# Patient Record
Sex: MALE | Race: BLACK OR AFRICAN AMERICAN | NOT HISPANIC OR LATINO | Employment: OTHER | URBAN - METROPOLITAN AREA
[De-identification: names, ages, dates, MRNs, and addresses within clinical notes are randomized per-mention and may not be internally consistent; named-entity substitution may affect disease eponyms.]

---

## 2017-02-13 ENCOUNTER — HOSPITAL ENCOUNTER (EMERGENCY)
Facility: HOSPITAL | Age: 72
Discharge: HOME | End: 2017-02-13
Attending: EMERGENCY MEDICINE
Payer: COMMERCIAL

## 2017-02-13 VITALS
TEMPERATURE: 98 F | WEIGHT: 121 LBS | OXYGEN SATURATION: 97 % | DIASTOLIC BLOOD PRESSURE: 61 MMHG | BODY MASS INDEX: 16.04 KG/M2 | SYSTOLIC BLOOD PRESSURE: 127 MMHG | HEART RATE: 59 BPM | RESPIRATION RATE: 18 BRPM | HEIGHT: 73 IN

## 2017-02-13 DIAGNOSIS — N30.00 ACUTE CYSTITIS WITHOUT HEMATURIA: Primary | ICD-10-CM

## 2017-02-13 DIAGNOSIS — N40.0 ENLARGED PROSTATE: ICD-10-CM

## 2017-02-13 DIAGNOSIS — K59.00 CONSTIPATION, UNSPECIFIED CONSTIPATION TYPE: ICD-10-CM

## 2017-02-13 DIAGNOSIS — R10.33 PERIUMBILICAL ABDOMINAL PAIN: ICD-10-CM

## 2017-02-13 LAB
ALBUMIN SERPL BCP-MCNC: 3.7 G/DL
ALP SERPL-CCNC: 119 U/L
ALT SERPL W/O P-5'-P-CCNC: 11 U/L
ANION GAP SERPL CALC-SCNC: 8 MMOL/L
AST SERPL-CCNC: 17 U/L
BACTERIA #/AREA URNS HPF: NORMAL /HPF
BASOPHILS # BLD AUTO: 0.01 K/UL
BASOPHILS NFR BLD: 0.2 %
BILIRUB SERPL-MCNC: 0.5 MG/DL
BILIRUB UR QL STRIP: NEGATIVE
BUN SERPL-MCNC: 6 MG/DL
CALCIUM SERPL-MCNC: 9.3 MG/DL
CHLORIDE SERPL-SCNC: 103 MMOL/L
CLARITY UR: CLEAR
CO2 SERPL-SCNC: 29 MMOL/L
COLOR UR: YELLOW
CREAT SERPL-MCNC: 1 MG/DL
DIFFERENTIAL METHOD: ABNORMAL
EOSINOPHIL # BLD AUTO: 0.2 K/UL
EOSINOPHIL NFR BLD: 3.2 %
ERYTHROCYTE [DISTWIDTH] IN BLOOD BY AUTOMATED COUNT: 15.2 %
EST. GFR  (AFRICAN AMERICAN): >60 ML/MIN/1.73 M^2
EST. GFR  (NON AFRICAN AMERICAN): >60 ML/MIN/1.73 M^2
GLUCOSE SERPL-MCNC: 99 MG/DL
GLUCOSE UR QL STRIP: NEGATIVE
HCT VFR BLD AUTO: 51.9 %
HGB BLD-MCNC: 17.7 G/DL
HGB UR QL STRIP: NEGATIVE
KETONES UR QL STRIP: NEGATIVE
LEUKOCYTE ESTERASE UR QL STRIP: ABNORMAL
LIPASE SERPL-CCNC: 35 U/L
LYMPHOCYTES # BLD AUTO: 2.1 K/UL
LYMPHOCYTES NFR BLD: 35.8 %
MCH RBC QN AUTO: 30.4 PG
MCHC RBC AUTO-ENTMCNC: 34.1 %
MCV RBC AUTO: 89 FL
MICROSCOPIC COMMENT: NORMAL
MONOCYTES # BLD AUTO: 0.7 K/UL
MONOCYTES NFR BLD: 11.8 %
NEUTROPHILS # BLD AUTO: 2.9 K/UL
NEUTROPHILS NFR BLD: 49 %
NITRITE UR QL STRIP: NEGATIVE
PH UR STRIP: 5 [PH] (ref 5–8)
PLATELET # BLD AUTO: 245 K/UL
PMV BLD AUTO: 9.8 FL
POTASSIUM SERPL-SCNC: 3.9 MMOL/L
PROT SERPL-MCNC: 7.4 G/DL
PROT UR QL STRIP: NEGATIVE
RBC # BLD AUTO: 5.82 M/UL
RBC #/AREA URNS HPF: 2 /HPF (ref 0–4)
SODIUM SERPL-SCNC: 140 MMOL/L
SP GR UR STRIP: 1.01 (ref 1–1.03)
SQUAMOUS #/AREA URNS HPF: 2 /HPF
URN SPEC COLLECT METH UR: ABNORMAL
UROBILINOGEN UR STRIP-ACNC: NEGATIVE EU/DL
WBC # BLD AUTO: 5.86 K/UL
WBC #/AREA URNS HPF: 4 /HPF (ref 0–5)

## 2017-02-13 PROCEDURE — 81000 URINALYSIS NONAUTO W/SCOPE: CPT

## 2017-02-13 PROCEDURE — 99284 EMERGENCY DEPT VISIT MOD MDM: CPT

## 2017-02-13 PROCEDURE — 80053 COMPREHEN METABOLIC PANEL: CPT

## 2017-02-13 PROCEDURE — 25500020 PHARM REV CODE 255: Performed by: EMERGENCY MEDICINE

## 2017-02-13 PROCEDURE — 87086 URINE CULTURE/COLONY COUNT: CPT

## 2017-02-13 PROCEDURE — 25000003 PHARM REV CODE 250: Performed by: NURSE PRACTITIONER

## 2017-02-13 PROCEDURE — 83690 ASSAY OF LIPASE: CPT

## 2017-02-13 PROCEDURE — 85025 COMPLETE CBC W/AUTO DIFF WBC: CPT

## 2017-02-13 RX ORDER — DOXYCYCLINE HYCLATE 100 MG
100 TABLET ORAL
Status: COMPLETED | OUTPATIENT
Start: 2017-02-13 | End: 2017-02-13

## 2017-02-13 RX ORDER — DOXYCYCLINE 100 MG/1
100 CAPSULE ORAL 2 TIMES DAILY
Qty: 20 CAPSULE | Refills: 0 | Status: SHIPPED | OUTPATIENT
Start: 2017-02-13 | End: 2017-02-23

## 2017-02-13 RX ORDER — POLYETHYLENE GLYCOL 3350 17 G/17G
17 POWDER, FOR SOLUTION ORAL
Status: COMPLETED | OUTPATIENT
Start: 2017-02-13 | End: 2017-02-13

## 2017-02-13 RX ORDER — POLYETHYLENE GLYCOL 3350 17 G/17G
17 POWDER, FOR SOLUTION ORAL DAILY
Qty: 119 G | Refills: 0 | OUTPATIENT
Start: 2017-02-13 | End: 2022-10-09

## 2017-02-13 RX ADMIN — IOHEXOL 75 ML: 350 INJECTION, SOLUTION INTRAVENOUS at 04:02

## 2017-02-13 RX ADMIN — POLYETHYLENE GLYCOL 3350 17 G: 17 POWDER, FOR SOLUTION ORAL at 06:02

## 2017-02-13 RX ADMIN — DOXYCYCLINE HYCLATE 100 MG: 100 TABLET, COATED ORAL at 06:02

## 2017-02-13 NOTE — ED AVS SNAPSHOT
[facility]  [address] [place] [address]               Gabriel Springer   2017  2:19 PM   ED    Description:  Male : 1945   Department:  [facility]           Your Care was Coordinated By:     Provider Role From To    Leatha Pinedo MD Attending Provider 17 5602 --    Elizabeth Messina NP Nurse Practitioner 17 --      Reason for Visit     Abdominal Pain           Diagnoses this Visit        Comments    Acute cystitis without hematuria    -  Primary     Enlarged prostate         Periumbilical abdominal pain         Constipation, unspecified constipation type           ED Disposition     None           To Do List           Follow-up Information     Schedule an appointment as soon as possible for a visit with Tyler Urrutia MD.    Specialty:  Internal Medicine    Why:  As needed, If symptoms worsen    Contact information:    [address]  [place] [facility] [address]  [phone]          Schedule an appointment as soon as possible for a visit with VANCE Mcclure MD.    Specialty:  Urology    Why:  Urology    Contact information:    [address]  [address]  [facility] [address]  [phone]         These Medications        Disp Refills Start End    doxycycline (VIBRAMYCIN) 100 MG Cap 20 capsule 0 2017    Take 1 capsule (100 mg total) by mouth 2 (two) times daily. - Oral    Pharmacy: [facility]/pharmacy #[id number] [address] Ph #: [phone]       polyethylene glycol (GLYCOLAX) 17 gram/dose powder 119 g 0 2017     Take 17 g by mouth once daily. - Oral    Pharmacy: [facility]/pharmacy #[id number] - [address] Ph #: [phone]         [facility]     [facility] -  Assistance  Registered nurses in the [facility] provide clinical advisement, health education, appointment booking, and other advisory  services.  Call for this free service at [phone].             Medications           Message regarding Medications     Verify the changes and/or additions to your medication regime listed below are the same as discussed with your clinician today.  If any of these changes or additions are incorrect, please notify your healthcare provider.        START taking these NEW medications        Refills    doxycycline (VIBRAMYCIN) 100 MG Cap 0    Sig: Take 1 capsule (100 mg total) by mouth 2 (two) times daily.    Class: Print    Route: Oral    polyethylene glycol (GLYCOLAX) 17 gram/dose powder 0    Sig: Take 17 g by mouth once daily.    Class: Print    Route: Oral      These medications were administered today        Dose Freq    omnipaque 350 iohexol 75 mL 75 mL IMG once as needed    Sig: Inject 75 mLs into the vein ONCE PRN for contrast.    Class: Normal    Route: Intravenous    polyethylene glycol packet 17 g 17 g ED 1 Time    Sig: Take 17 g by mouth ED 1 Time.    Class: Normal    Route: Oral    doxycycline tablet 100 mg 100 mg ED 1 Time    Sig: Take 1 tablet (100 mg total) by mouth ED 1 Time.    Class: Normal    Route: Oral           Verify that the below list of medications is an accurate representation of the medications you are currently taking.  If none reported, the list may be blank. If incorrect, please contact your healthcare provider. Carry this list with you in case of emergency.           Current Medications     tamsulosin (FLOMAX) 0.4 mg Cp24 Take 0.4 mg by mouth once daily.    doxycycline (VIBRAMYCIN) 100 MG Cap Take 1 capsule (100 mg total) by mouth 2 (two) times daily.    doxycycline tablet 100 mg Take 1 tablet (100 mg total) by mouth ED 1 Time.    polyethylene glycol (GLYCOLAX) 17 gram/dose powder Take 17 g by mouth once daily.    polyethylene glycol packet 17 g Take 17 g by mouth ED 1 Time.           Clinical Reference Information           Your Vitals Were     BP Pulse Temp Resp Height Weight     "127/61 (BP Location: Right arm, Patient Position: Sitting, BP Method: Automatic) 59 97.8 °F (36.6 °C) (Oral) 18 6' 1" (1.854 m) 54.9 kg (121 lb)    SpO2 BMI             97% 15.96 kg/m2         Allergies as of 2/13/2017        Reactions    Penicillins Hives      Immunizations Administered on Date of Encounter - 2/13/2017     None      ED Micro, Lab, POCT     Start Ordered       Status Ordering Provider    02/13/17 1735 02/13/17 1735  Urine culture **CANNOT BE ORDERED STAT**  Once      Ordered     02/13/17 1503 02/13/17 1503  Urinalysis Microscopic  Once      Final result     02/13/17 1502 02/13/17 1503  Lipase  STAT      Final result     02/13/17 1502 02/13/17 1503  Urinalysis  STAT      Final result     02/13/17 1501 02/13/17 1503  CBC auto differential  STAT      Final result     02/13/17 1501 02/13/17 1503  Comprehensive metabolic panel  STAT      Final result       ED Imaging Orders     Start Ordered       Status Ordering Provider    02/13/17 1502 02/13/17 1503  CT Abdomen Pelvis With Contrast  1 time imaging      Final result         Discharge Instructions       Please return to the ED for any new or worsening symptoms: chest pain, shortness of breath, loss of consciousness or any other concerns. Please follow up with primary care within in the week. You may also call [phone] for the [facility] same day appointment line.    Discharge References/Attachments     CONSTIPATION (ADULT) (ENGLISH)    BLADDER INFECTION, MALE (ADULT) (ENGLISH)      [facility] Sign-Up     Activating your [facility] account is as easy as 1-2-3!     1) Visit [email or web address], select Sign Up Now, enter this activation code and your date of birth, then select Next.  [email or web address]  Expires: 3/30/2017  5:45 PM      2) Create a username and password to use when you visit [facility] in the future and select a security question in case you lose your password and select Next.    3) Enter your e-mail address and click Sign " Up!    Additional Information  If you have questions, please e-mail [email or web address] or call [phone] to talk to our [facility] staff. Remember, [facility] is NOT to be used for urgent needs. For medical emergencies, dial 911.         Smoking Cessation     If you would like to quit smoking:   You may be eligible for free services if you are a [place] resident and started smoking cigarettes before September 1, 1988.  Call the Smoking Cessation Trust (SCT) toll free at (148) 965-7910 or (580) 069-6567.   Call -259-QUIT-NOW if you do not meet the above criteria.             [facility] complies with applicable Federal civil rights laws and does not discriminate on the basis of race, color, national origin, age, disability, or sex.        Language Assistance Services     ATTENTION: Language assistance services are available, free of charge. Please call [phone].      ATENCIÓN: Si habla español, tiene a munoz disposición servicios gratuitos de asistencia lingüística. Llame al [phone].     CHÚ Ý: N?u b?n nói Ti?ng Vi?t, có các d?ch v? h? tr? ngôn ng? mi?n phí imanih cho b?n. G?i s? [phone].

## 2017-02-13 NOTE — DISCHARGE INSTRUCTIONS
Please return to the ED for any new or worsening symptoms: chest pain, shortness of breath, loss of consciousness or any other concerns. Please follow up with primary care within in the week. You may also call [phone] for the [facility] same day appointment line.

## 2017-02-13 NOTE — ED PROVIDER NOTES
"Encounter Date: 2/13/2017    SCRIBE #1 NOTE: I, Marlon Lazo, am scribing for, and in the presence of,  Elizabeth Messina NP. I have scribed the following portions of the note - Other sections scribed: ROS, HPI.       History     Chief Complaint   Patient presents with    Abdominal Pain     states he's had abd pain for 2 weeks now     Review of patient's allergies indicates:   Allergen Reactions    Penicillins Hives     HPI Comments: CC: Abdominal Pain    HPI: Patient is a 71 y.o. M with a past medical history of Prostate atrophy who presents to the ED for evaluation of acute onset lower abdominal pain and constipation x10 days. Pain is mild and worse while lying flat. Patient notes some relief when he "pulls down the waistband of his boxers." He attempted treatment with Pepto Bismol with no relief. Patient denies fever, vomiting, diarrhea, black/bloody stools, testicular pain, dysuria, chest pain, shortness of breath, and cough. PCP Dr. Mckeon instructed him to come to the ED for further evaluation. He reports no history of abdominal surgery. He smokes 1 pack/day and drinks 1 beer/day.      The history is provided by the patient. No  was used.     Past Medical History   Diagnosis Date    Prostate atrophy      No past medical history pertinent negatives.  History reviewed. No pertinent past surgical history.  History reviewed. No pertinent family history.  Social History   Substance Use Topics    Smoking status: Current Some Day Smoker     Packs/day: 1.00     Types: Cigarettes    Smokeless tobacco: None    Alcohol use 0.6 oz/week     1 Cans of beer per week      Comment: every night     Review of Systems   Constitutional: Negative for fever.   HENT: Negative for sore throat.    Eyes: Negative for redness.   Respiratory: Negative for cough and shortness of breath.    Cardiovascular: Negative for chest pain.   Gastrointestinal: Positive for abdominal pain (lower) and constipation. Negative " for blood in stool, diarrhea and vomiting.   Genitourinary: Negative for dysuria and testicular pain.   Musculoskeletal: Negative for back pain.   Skin: Negative for rash.   Neurological: Negative for headaches.       Physical Exam   Initial Vitals   BP Pulse Resp Temp SpO2   02/13/17 1347 02/13/17 1347 02/13/17 1347 02/13/17 1347 02/13/17 1347   142/65 60 18 97.9 °F (36.6 °C) 98 %     Physical Exam    Constitutional: Vital signs are normal. He appears well-developed and well-nourished.  Non-toxic appearance.   Eyes: EOM are normal.   Neck: Full passive range of motion without pain. Neck supple. No rigidity.   Cardiovascular: Normal rate, S1 normal, S2 normal and normal heart sounds. Exam reveals no gallop.    No murmur heard.  Pulmonary/Chest: Effort normal and breath sounds normal. No tachypnea. He has no decreased breath sounds. He has no wheezes. He has no rhonchi. He has no rales.   Abdominal: Soft. Normal appearance. There is no tenderness. There is no CVA tenderness, no tenderness at McBurney's point and negative Mejia's sign.   Neurological: He is alert and oriented to person, place, and time. GCS eye subscore is 4. GCS verbal subscore is 5. GCS motor subscore is 6.   Skin: Skin is warm, dry and intact. No rash noted.   Psychiatric: He has a normal mood and affect.         ED Course   Procedures  Labs Reviewed   CBC W/ AUTO DIFFERENTIAL - Abnormal; Notable for the following:        Result Value    RDW 15.2 (*)     All other components within normal limits   COMPREHENSIVE METABOLIC PANEL - Abnormal; Notable for the following:     BUN, Bld 6 (*)     All other components within normal limits   URINALYSIS - Abnormal; Notable for the following:     Leukocytes, UA 2+ (*)     All other components within normal limits   CULTURE, URINE   LIPASE   URINALYSIS MICROSCOPIC             Medical Decision Making:   ED Management:  This is a 71-year-old male who presents to the ED with complaints of periumbilical abdominal  pain.  He is afebrile and well-appearing.  On exam, abdomen is soft and nontender.  He is very thin.  Laboratory evaluation grossly unremarkable.  CT of abdomen/pelvis with contrast shows no acute findings other than severe prostatomegaly.  Patient reports he is being treated for this.  There is also a large amount of stool noted.  UA shows 2+ leukocytes.  I will treat with doxycycline and MiraLAX.  I suspect a low probability for prostatitis, appendicitis, pyelonephritis or other serious etiology.  Discharged home with instructions for supportive care and follow-up.  Return precautions given.  Patient's case was discussed with Dr. Pinedo, who agrees with this plan of care.            Scribe Attestation:   Scribe #1: I performed the above scribed service and the documentation accurately describes the services I performed. I attest to the accuracy of the note.    Attending Attestation:           Physician Attestation for Scribe:  Physician Attestation Statement for Scribe #1: I, Elizabeth Messina NP, reviewed documentation, as scribed by Marlon Lazo in my presence, and it is both accurate and complete.                 ED Course     Clinical Impression:   The primary encounter diagnosis was Acute cystitis without hematuria. Diagnoses of Enlarged prostate, Periumbilical abdominal pain, and Constipation, unspecified constipation type were also pertinent to this visit.    Disposition:   Disposition: Discharged  Condition: Stable       Elizabeth Messina NP  02/13/17 2110

## 2017-02-13 NOTE — ED TRIAGE NOTES
Pt states that been having stomach pain for 2 weeks.  Pt states middle area of stomach has been pain.  States that can't have any pressure to stomach.  Pt states when walking, standing or sitting pain is a 3.  Pt states that when he laying down the pain is a 10.  Denies trauma, nausea or vomiting.

## 2017-02-15 LAB — BACTERIA UR CULT: NORMAL

## 2017-05-01 ENCOUNTER — HOSPITAL ENCOUNTER (EMERGENCY)
Facility: HOSPITAL | Age: 72
Discharge: HOME | End: 2017-05-01
Attending: EMERGENCY MEDICINE
Payer: COMMERCIAL

## 2017-05-01 VITALS
SYSTOLIC BLOOD PRESSURE: 150 MMHG | TEMPERATURE: 98 F | HEIGHT: 73 IN | DIASTOLIC BLOOD PRESSURE: 74 MMHG | HEART RATE: 88 BPM | RESPIRATION RATE: 20 BRPM | BODY MASS INDEX: 15.77 KG/M2 | OXYGEN SATURATION: 99 % | WEIGHT: 119 LBS

## 2017-05-01 DIAGNOSIS — R21 RASH: Primary | ICD-10-CM

## 2017-05-01 PROCEDURE — 99283 EMERGENCY DEPT VISIT LOW MDM: CPT | Mod: 25

## 2017-05-01 PROCEDURE — 96372 THER/PROPH/DIAG INJ SC/IM: CPT

## 2017-05-01 PROCEDURE — 63600175 PHARM REV CODE 636 W HCPCS: Performed by: PHYSICIAN ASSISTANT

## 2017-05-01 RX ORDER — DESONIDE 0.5 MG/G
CREAM TOPICAL 2 TIMES DAILY
Qty: 60 G | Refills: 1 | Status: SHIPPED | OUTPATIENT
Start: 2017-05-01 | End: 2023-01-31

## 2017-05-01 RX ORDER — BETAMETHASONE SODIUM PHOSPHATE AND BETAMETHASONE ACETATE 3; 3 MG/ML; MG/ML
6 INJECTION, SUSPENSION INTRA-ARTICULAR; INTRALESIONAL; INTRAMUSCULAR; SOFT TISSUE
Status: COMPLETED | OUTPATIENT
Start: 2017-05-01 | End: 2017-05-01

## 2017-05-01 RX ADMIN — BETAMETHASONE SODIUM PHOSPHATE AND BETAMETHASONE ACETATE 6 MG: 3; 3 INJECTION, SUSPENSION INTRA-ARTICULAR; INTRALESIONAL; INTRAMUSCULAR at 10:05

## 2017-05-01 NOTE — ED PROVIDER NOTES
Encounter Date: 5/1/2017    SCRIBE #1 NOTE: I, Lenard Rushing, am scribing for, and in the presence of,  MECHE Herbert. I have scribed the following portions of the note - Other sections scribed: HPI and ROS.       History     Chief Complaint   Patient presents with    Rash     x4 days to legs and feet, +itching, dry flaking skin     Review of patient's allergies indicates:   Allergen Reactions    Penicillins Hives     HPI Comments: CC: Rash     HPI: This 72 y.o M smoker with prostate atrophy presents to the ED c/o acute onset of a constant rash to the bilateral forearms, bilateral hands and bilateral ankles x4 days. The pt reports associtated itching and dry flaking skin. Pt reports a previous episode with similar symptoms 11 years ago. The pt denies fever, chills, and generalized body aches. No prior tx.     The history is provided by the patient. No  was used.     Past Medical History:   Diagnosis Date    Prostate atrophy      History reviewed. No pertinent surgical history.  History reviewed. No pertinent family history.  Social History   Substance Use Topics    Smoking status: Current Some Day Smoker     Packs/day: 1.00     Types: Cigarettes    Smokeless tobacco: None    Alcohol use 0.6 oz/week     1 Cans of beer per week      Comment: every night     Review of Systems   Constitutional: Negative for chills and fever.   HENT: Negative for sore throat.    Respiratory: Negative for shortness of breath.    Cardiovascular: Negative for chest pain.   Gastrointestinal: Negative for nausea.   Genitourinary: Negative for dysuria.   Musculoskeletal: Negative for back pain and myalgias.   Skin: Positive for rash.        (+) itching   Neurological: Negative for weakness.   Hematological: Does not bruise/bleed easily.       Physical Exam   Initial Vitals   BP Pulse Resp Temp SpO2   05/01/17 0919 05/01/17 0919 05/01/17 0919 05/01/17 0919 05/01/17 0919   155/71 87 17 97.6 °F (36.4 °C) 99 %      Physical Exam    Constitutional: He appears well-developed.   Cardiovascular: Normal rate, regular rhythm, normal heart sounds and intact distal pulses.   Pulmonary/Chest: Breath sounds normal.   Musculoskeletal: Normal range of motion. He exhibits no edema or tenderness.   Neurological: He is alert and oriented to person, place, and time. He has normal strength.   Skin:   Dry, scaly eczematous like rash to bilateral upper extremities and bilateral ankles.         ED Course   Procedures  Labs Reviewed - No data to display          Medical Decision Making:   Initial Assessment:   72-year-old male presents complaining of pruritic flaky rash to bilateral upper extremities and bilateral ankles for 4 days.  Patient denies history of eczema or psoriasis.  On exam patient has been extremitas like rash to bilateral upper extremities and bilateral ankles.  There is no erythema or tenderness to suggest infection at this time.  I suspect patient has eczema or a contact dermatitis.  Patient given Celestone 6 mg IM in ED.  I will discharge patient home with desonide cream.  Patient instructed to follow up with dermatologist.  Patient stable for discharge.            Scribe Attestation:   Scribe #1: I performed the above scribed service and the documentation accurately describes the services I performed. I attest to the accuracy of the note.    Attending Attestation:     Physician Attestation Statement for NP/PA:   I have conducted a face to face encounter with this patient in addition to the NP/PA, due to NP/PA Request    Other NP/PA Attestation Additions:      Medical Decision Makin y.o. male presents with scaly itchy rash to bilateral arms and bilateral lower extremities for approximately one week.  On exam he has scaly pruritic rash, no petechiae or purpura.  No induration, erythema, or fluctuance.  Patient does have evidence of venous stasis to bilateral lower extremities.  Given IM steroid, and will be treated with  oral Benadryl and topical steroid cream.  Plan to refer to PCP and dermatology. I have seen and examined this patient with mid-level provider and agree with physical exam, assessment and plan.        Physician Attestation for Scribe:  Physician Attestation Statement for Scribe #1: I, MECHE Herbert, reviewed documentation, as scribed by Lenard Rushing in my presence, and it is both accurate and complete.                 ED Course     Clinical Impression:   The encounter diagnosis was Rash.          MECHE Velez  05/01/17 5728       Marlys Payne MD  05/03/17 4517

## 2017-05-01 NOTE — ED AVS SNAPSHOT
[facility]  [address] [place] [address]               Gabriel Springer   2017 10:08 AM   ED    Description:  Male : 1945   Department:  [facility]           Your Care was Coordinated By:     Provider Role From To    Marlys Payne MD Attending Provider 17 1015 --    MECHE Velez Physician Assistant 17 1015 --      Reason for Visit     Rash           Diagnoses this Visit        Comments    Rash    -  Primary       ED Disposition     None           To Do List           Follow-up Information     Schedule an appointment as soon as possible for a visit with Aidan Ellis MD.    Specialty:  Dermatology    Contact information:    [address]  [facility] [place] [address]  [phone]         These Medications        Disp Refills Start End    desonide (DESOWEN) 0.05 % cream 60 g 1 2017    Apply topically 2 (two) times daily. - Topical (Top)    Pharmacy: [facility]/pharmacy #[id number] - [place] [address] #: [phone]         [facility]     [facility] Care [facility] -  Assistance  Unless otherwise directed by your provider, please contact [facility], our nurse care line that is available for  assistance.     Registered nurses in the [facility] provide: appointment scheduling, clinical advisement, health education, and other advisory services.  Call: [phone] (toll free)               Medications           Message regarding Medications     Verify the changes and/or additions to your medication regime listed below are the same as discussed with your clinician today.  If any of these changes or additions are incorrect, please notify your healthcare provider.        START taking these NEW medications        Refills    desonide (DESOWEN) 0.05 % cream 1    Sig: Apply topically 2 (two) times daily.    Class: Print     "Route: Topical (Top)      These medications were administered today        Dose Freq    betamethasone acetate-betamethasone sodium phosphate injection 6 mg 6 mg ED 1 Time    Sig: Inject 1 mL (6 mg total) into the muscle ED 1 Time.    Class: Normal    Route: Intramuscular    Cosign for Ordering: Required by Marlys Payne MD           Verify that the below list of medications is an accurate representation of the medications you are currently taking.  If none reported, the list may be blank. If incorrect, please contact your healthcare provider. Carry this list with you in case of emergency.           Current Medications     desonide (DESOWEN) 0.05 % cream Apply topically 2 (two) times daily.    polyethylene glycol (GLYCOLAX) 17 gram/dose powder Take 17 g by mouth once daily.    tamsulosin (FLOMAX) 0.4 mg Cp24 Take 0.4 mg by mouth once daily.           Clinical Reference Information           Your Vitals Were     BP Pulse Temp Resp Height Weight    155/71 (BP Location: Right arm, Patient Position: Sitting) 87 97.6 °F (36.4 °C) (Oral) 17 6' 1" (1.854 m) 54 kg (119 lb)    SpO2 BMI             99% 15.7 kg/m2         Allergies as of 5/1/2017        Reactions    Penicillins Hives      Immunizations Administered on Date of Encounter - 5/1/2017     None      ED Micro, Lab, POCT     None      ED Imaging Orders     None      Discharge References/Attachments     DERMATITIS, WHAT IS ATOPIC  (ENGLISH)      [facility] Sign-Up     Activating your [facility] account is as easy as 1-2-3!     1) Visit [email or web address], select Sign Up Now, enter this activation code and your date of birth, then select Next.  [email or web address]  Expires: 6/15/2017 11:02 AM      2) Create a username and password to use when you visit [facility] in the future and select a security question in case you lose your password and select Next.    3) Enter your e-mail address and click Sign Up!    Additional Information  If you have questions, please e-mail " [email or web address] or call [phone] to talk to our [facility] staff. Remember, [facility] is NOT to be used for urgent needs. For medical emergencies, dial 911.         Smoking Cessation     If you would like to quit smoking:   You may be eligible for free services if you are a [place] resident and started smoking cigarettes before September 1, 1988.  Call the Smoking Cessation Trust ([facility]) toll free at ([phone] [phone] or (079) 725-3360.   Call 4-813-QUIT-NOW if you do not meet the above criteria.   Contact us via email: [email or web address]   View our website for more information: [email or web address]         [facility] complies with applicable Federal civil rights laws and does not discriminate on the basis of race, color, national origin, age, disability, or sex.        Language Assistance Services     ATTENTION: Language assistance services are available, free of charge. Please call [phone].      ATENCIÓN: Si habla español, tiene a munoz disposición servicios gratuitos de asistencia lingüística. Llame al [phone].     CHÚ Ý: N?u b?n nói Ti?ng Vi?t, có các d?ch v? h? tr? ngôn ng? mi?n phí dành cho b?n. G?i s? [phone].

## 2018-05-14 ENCOUNTER — HOSPITAL ENCOUNTER (EMERGENCY)
Facility: HOSPITAL | Age: 73
Discharge: HOME | End: 2018-05-14
Attending: EMERGENCY MEDICINE
Payer: COMMERCIAL

## 2018-05-14 VITALS
HEART RATE: 80 BPM | DIASTOLIC BLOOD PRESSURE: 81 MMHG | BODY MASS INDEX: 15.64 KG/M2 | OXYGEN SATURATION: 98 % | RESPIRATION RATE: 16 BRPM | WEIGHT: 118 LBS | TEMPERATURE: 98 F | SYSTOLIC BLOOD PRESSURE: 126 MMHG | HEIGHT: 73 IN

## 2018-05-14 DIAGNOSIS — R10.9 PAIN, FLANK, BILATERAL: Primary | ICD-10-CM

## 2018-05-14 DIAGNOSIS — K59.00 CONSTIPATION: ICD-10-CM

## 2018-05-14 LAB
BILIRUB UR QL STRIP: NEGATIVE
CLARITY UR: CLEAR
COLOR UR: YELLOW
GLUCOSE UR QL STRIP: NEGATIVE
HGB UR QL STRIP: NEGATIVE
KETONES UR QL STRIP: NEGATIVE
LEUKOCYTE ESTERASE UR QL STRIP: NEGATIVE
MICROSCOPIC COMMENT: NORMAL
NITRITE UR QL STRIP: NEGATIVE
PH UR STRIP: 7 [PH] (ref 5–8)
PROT UR QL STRIP: NEGATIVE
SP GR UR STRIP: 1.01 (ref 1–1.03)
URN SPEC COLLECT METH UR: NORMAL
UROBILINOGEN UR STRIP-ACNC: NEGATIVE EU/DL
WBC #/AREA URNS HPF: 1 /HPF (ref 0–5)

## 2018-05-14 PROCEDURE — 81000 URINALYSIS NONAUTO W/SCOPE: CPT

## 2018-05-14 PROCEDURE — 99284 EMERGENCY DEPT VISIT MOD MDM: CPT

## 2018-05-14 RX ORDER — METHOCARBAMOL 500 MG/1
1000 TABLET, FILM COATED ORAL 3 TIMES DAILY
Qty: 30 TABLET | Refills: 0 | Status: SHIPPED | OUTPATIENT
Start: 2018-05-14 | End: 2018-05-19

## 2018-05-14 NOTE — ED PROVIDER NOTES
Encounter Date: 5/14/2018  SORT MSE:  Pt is a 73 y.o. male who presents for emergent consideration for bilateral flank pain, onset 1 week, denies trauma, denies n/v/d. Reports history of bph.  States he has not had a bm in 5-6 days and took a laxative yesterday then had a bm.  Pt will be moved to room when one is available, otherwise will wait in waiting room with triage nurse supervision.  Pt arrived by ambulatory. He is not in distress. Orders have been placed. MICHELLE Vergara DNP [facility] 05/14/2018 1050       History     Chief Complaint   Patient presents with    Flank Pain     reports bilateral flank x 1 wk; denies NVD; last BM today normal; denies dysuria     HPI   This 73-year-old male presents to the emergency room complaining of bilateral flank pain worse with twisting and moving.  There is no history of trauma.  The patient has had symptoms for about a week.  He is essentially otherwise well without nausea vomiting diarrhea or painful urination.  He had a normal bowel movement today.  There is no history of trauma.  He is otherwise well.  Review of patient's allergies indicates:   Allergen Reactions    Penicillins Hives     Past Medical History:   Diagnosis Date    Prostate atrophy      No past surgical history on file.  No family history on file.  Social History   Substance Use Topics    Smoking status: Current Some Day Smoker     Packs/day: 1.00     Types: Cigarettes    Smokeless tobacco: Not on file    Alcohol use 0.6 oz/week     1 Cans of beer per week      Comment: every night     Review of Systems  The patient was questioned specifically with regard to the following.  General: Fever, chills, sweats. Neuro: Headache. Eyes: eye problems. ENT: Ear pain, sore throat. Cardiovascular: Chest pain. Respiratory: Cough, shortness of breath. Gastrointestinal: Abdominal pain, vomiting, diarrhea. Genitourinary: Painful urination.  Musculoskeletal: Arm and leg problems. Skin: Rash.  The review of systems was  negative except for the following:  Bilateral flank pain  Physical Exam     Initial Vitals [05/14/18 1050]   BP Pulse Resp Temp SpO2   (!) 122/58 76 20 97.8 °F (36.6 °C) 97 %      MAP       79.33         Physical Exam  The patient was examined specifically for the following:   General:No significant distress, Good color, Warm and dry. Head and neck:Scalp atraumatic, Neck supple. Neurological:Appropriate conversation, Gross motor deficits. Eyes:Conjugate gaze, Clear corneas. ENT: No epistaxis. Cardiac: Regular rate and rhythm, Grossly normal heart tones. Pulmonary: Wheezing, Rales. Gastrointestinal: Abdominal tenderness, Abdominal distention. Musculoskeletal: Extremity deformity, Apparent pain with range of motion of the joints. Skin: Rash.   The findings on examination were normal except for the following:  The patient is very thin.  There is no significant abdominal tenderness or midline lumbar tenderness.  The patient has mild tenderness in the mid axillary line in both flanks.  The lungs are clear.  The heart tones are normal.  The extremities are nontender.  The patient is in no distress.  ED Course   Procedures  Labs Reviewed   URINALYSIS, REFLEX TO URINE CULTURE   URINALYSIS MICROSCOPIC        Medical decision making:  Given the above, it seems highly unlikely that this patient has kidney stone aortic disease pyelonephritis urinary tract infections.  The patient's urinalysis is negative flat rectal the abdomen fails to reveal significant abnormalities.  X-Rays:   Independently Interpreted Readings:   Other Readings:  Lab directed the abdomen fails to reveal significant abnormalities                         Clinical Impression:   The primary encounter diagnosis was Pain, flank, bilateral. A diagnosis of Constipation was also pertinent to this visit.                           Jordan Schwartz MD  05/14/18 8393

## 2018-05-14 NOTE — DISCHARGE INSTRUCTIONS
Robaxin for pain. Please return immediately if you get worse or if new problems develop.  Lots of liquids.  Rest.  You may use a heating pad.

## 2018-05-15 ENCOUNTER — PES CALL (OUTPATIENT)
Dept: ADMINISTRATIVE | Facility: CLINIC | Age: 73
End: 2018-05-15

## 2019-01-09 ENCOUNTER — HOSPITAL ENCOUNTER (EMERGENCY)
Facility: HOSPITAL | Age: 74
Discharge: HOME | End: 2019-01-09
Attending: EMERGENCY MEDICINE
Payer: COMMERCIAL

## 2019-01-09 VITALS
SYSTOLIC BLOOD PRESSURE: 151 MMHG | BODY MASS INDEX: 15.24 KG/M2 | WEIGHT: 115 LBS | HEIGHT: 73 IN | HEART RATE: 86 BPM | OXYGEN SATURATION: 96 % | DIASTOLIC BLOOD PRESSURE: 72 MMHG | TEMPERATURE: 99 F | RESPIRATION RATE: 20 BRPM

## 2019-01-09 DIAGNOSIS — R09.1 PLEURISY: ICD-10-CM

## 2019-01-09 DIAGNOSIS — J20.9 ACUTE BRONCHITIS, UNSPECIFIED ORGANISM: Primary | ICD-10-CM

## 2019-01-09 LAB
ALBUMIN SERPL BCP-MCNC: 3.7 G/DL
ALP SERPL-CCNC: 105 U/L
ALT SERPL W/O P-5'-P-CCNC: 13 U/L
ANION GAP SERPL CALC-SCNC: 10 MMOL/L
AST SERPL-CCNC: 33 U/L
BASOPHILS # BLD AUTO: 0.04 K/UL
BASOPHILS NFR BLD: 1 %
BILIRUB SERPL-MCNC: 0.2 MG/DL
BUN SERPL-MCNC: 7 MG/DL
CALCIUM SERPL-MCNC: 9.1 MG/DL
CHLORIDE SERPL-SCNC: 101 MMOL/L
CO2 SERPL-SCNC: 26 MMOL/L
CREAT SERPL-MCNC: 0.9 MG/DL
DIFFERENTIAL METHOD: ABNORMAL
EOSINOPHIL # BLD AUTO: 0.3 K/UL
EOSINOPHIL NFR BLD: 6.1 %
ERYTHROCYTE [DISTWIDTH] IN BLOOD BY AUTOMATED COUNT: 14.9 %
EST. GFR  (AFRICAN AMERICAN): >60 ML/MIN/1.73 M^2
EST. GFR  (NON AFRICAN AMERICAN): >60 ML/MIN/1.73 M^2
GLUCOSE SERPL-MCNC: 102 MG/DL
HCT VFR BLD AUTO: 43.4 %
HGB BLD-MCNC: 14.5 G/DL
LYMPHOCYTES # BLD AUTO: 1.4 K/UL
LYMPHOCYTES NFR BLD: 34.6 %
MCH RBC QN AUTO: 30.2 PG
MCHC RBC AUTO-ENTMCNC: 33.4 G/DL
MCV RBC AUTO: 90 FL
MONOCYTES # BLD AUTO: 0.5 K/UL
MONOCYTES NFR BLD: 12 %
NEUTROPHILS # BLD AUTO: 1.9 K/UL
NEUTROPHILS NFR BLD: 46.3 %
PLATELET # BLD AUTO: 225 K/UL
PMV BLD AUTO: 10.5 FL
POTASSIUM SERPL-SCNC: 4.6 MMOL/L
PROT SERPL-MCNC: 8 G/DL
RBC # BLD AUTO: 4.8 M/UL
SODIUM SERPL-SCNC: 137 MMOL/L
TROPONIN I SERPL DL<=0.01 NG/ML-MCNC: <0.006 NG/ML
WBC # BLD AUTO: 4.08 K/UL

## 2019-01-09 PROCEDURE — 94640 AIRWAY INHALATION TREATMENT: CPT

## 2019-01-09 PROCEDURE — 99285 EMERGENCY DEPT VISIT HI MDM: CPT | Mod: 25

## 2019-01-09 PROCEDURE — 80053 COMPREHEN METABOLIC PANEL: CPT

## 2019-01-09 PROCEDURE — 93010 ELECTROCARDIOGRAM REPORT: CPT | Mod: ,,, | Performed by: INTERNAL MEDICINE

## 2019-01-09 PROCEDURE — 25000242 PHARM REV CODE 250 ALT 637 W/ HCPCS: Performed by: EMERGENCY MEDICINE

## 2019-01-09 PROCEDURE — 93005 ELECTROCARDIOGRAM TRACING: CPT

## 2019-01-09 PROCEDURE — 36000 PLACE NEEDLE IN VEIN: CPT

## 2019-01-09 PROCEDURE — 85025 COMPLETE CBC W/AUTO DIFF WBC: CPT

## 2019-01-09 PROCEDURE — 84484 ASSAY OF TROPONIN QUANT: CPT

## 2019-01-09 PROCEDURE — 93010 EKG 12-LEAD: ICD-10-PCS | Mod: ,,, | Performed by: INTERNAL MEDICINE

## 2019-01-09 RX ORDER — GUAIFENESIN/DEXTROMETHORPHAN 100-10MG/5
5 SYRUP ORAL 4 TIMES DAILY PRN
Qty: 120 ML | Refills: 0 | COMMUNITY
Start: 2019-01-09 | End: 2019-01-19

## 2019-01-09 RX ORDER — AZITHROMYCIN 250 MG/1
250 TABLET, FILM COATED ORAL DAILY
Qty: 6 TABLET | Refills: 0 | OUTPATIENT
Start: 2019-01-09 | End: 2022-10-09

## 2019-01-09 RX ORDER — ALBUTEROL SULFATE 2.5 MG/.5ML
2.5 SOLUTION RESPIRATORY (INHALATION)
Status: COMPLETED | OUTPATIENT
Start: 2019-01-09 | End: 2019-01-09

## 2019-01-09 RX ORDER — IPRATROPIUM BROMIDE AND ALBUTEROL SULFATE 2.5; .5 MG/3ML; MG/3ML
3 SOLUTION RESPIRATORY (INHALATION)
Status: COMPLETED | OUTPATIENT
Start: 2019-01-09 | End: 2019-01-09

## 2019-01-09 RX ORDER — ALBUTEROL SULFATE 90 UG/1
2 AEROSOL, METERED RESPIRATORY (INHALATION) EVERY 6 HOURS PRN
Qty: 1 INHALER | Refills: 0 | Status: SHIPPED | OUTPATIENT
Start: 2019-01-09 | End: 2023-04-15 | Stop reason: SDUPTHER

## 2019-01-09 RX ADMIN — ALBUTEROL SULFATE 2.5 MG: 2.5 SOLUTION RESPIRATORY (INHALATION) at 01:01

## 2019-01-09 RX ADMIN — IPRATROPIUM BROMIDE AND ALBUTEROL SULFATE 3 ML: .5; 3 SOLUTION RESPIRATORY (INHALATION) at 01:01

## 2019-01-09 NOTE — ED PROVIDER NOTES
"Encounter Date: 1/9/2019    SCRIBE #1 NOTE: I, Queenie Lo, am scribing for, and in the presence of,  Sonido Kelley III, MD. I have scribed the following portions of the note - Other sections scribed: SHILPA WILLIAMSON.       History     Chief Complaint   Patient presents with    Chest Pain     pt here for chest pain and coughing x4 days. reports coughing up "orange stuff".      The patient is a 73 y.o. M who presents with several days of productive cough with associated chest tightness and exertional shortness of breath for three days. The cough is productive of orange sputum. He has had pressure-like central chest discomfort with breathing. He denies diaphoresis and shortness of breath at rest. He has a history of childhood asthma but denies chronic long disease.  He reports improvement in his symptoms after receiving a nebulized breathing treatment in the ED prior to my evaluation. He hadsubjective fever three days ago that resolved after taking Aleve. He quit smoking 4 months ago, but restarted 3 weeks ago and has been smoking 3 cigarettes per day since then. Pt denies nausea, vomiting, palpitations, headache, dizziness, lightheadedness, and sick contacts.      The history is provided by the patient. No  was used.     Review of patient's allergies indicates:   Allergen Reactions    Penicillins Hives     Past Medical History:   Diagnosis Date    Prostate atrophy      No past surgical history on file.  No family history on file.  Social History     Tobacco Use    Smoking status: Current Some Day Smoker     Packs/day: 1.00     Types: Cigarettes   Substance Use Topics    Alcohol use: Yes     Alcohol/week: 0.6 oz     Types: 1 Cans of beer per week     Comment: every night    Drug use: No     Review of Systems   Constitutional: Negative for chills and fever.   HENT: Negative for ear pain, hearing loss, sore throat, tinnitus and trouble swallowing.    Eyes: Negative for visual disturbance. "   Respiratory: Positive for cough (productive) and shortness of breath.    Cardiovascular: Positive for chest pain. Negative for palpitations.   Gastrointestinal: Negative for abdominal pain, diarrhea, nausea and vomiting.   Genitourinary: Negative for dysuria.   Musculoskeletal: Negative for arthralgias, back pain and myalgias.   Skin: Negative for rash.   Neurological: Negative for dizziness, light-headedness and headaches.       Physical Exam     Initial Vitals [01/09/19 1244]   BP Pulse Resp Temp SpO2   138/69 80 18 98.1 °F (36.7 °C) 96 %      MAP       --         Physical Exam    Constitutional: He appears well-developed and well-nourished. He is not diaphoretic. No distress.   HENT:   Head: Normocephalic and atraumatic.   Mouth/Throat: Oropharynx is clear and moist.   Eyes: Conjunctivae are normal. No scleral icterus.   Neck: No JVD present.   Cardiovascular: Normal rate, regular rhythm and normal heart sounds. Exam reveals no gallop and no friction rub.    No murmur heard.  Pulses:       Radial pulses are 2+ on the right side, and 2+ on the left side.        Dorsalis pedis pulses are 2+ on the right side, and 2+ on the left side.   Pulmonary/Chest: No accessory muscle usage or stridor. No tachypnea. No respiratory distress. He has wheezes (scattered).   Abdominal: Soft. There is no tenderness.   Musculoskeletal:   Negative bilateral calf swelling and tenderness. Negative bilateral Orlando's sign.   Neurological: He is alert and oriented to person, place, and time. He has normal strength. No cranial nerve deficit or sensory deficit. GCS eye subscore is 4. GCS verbal subscore is 5. GCS motor subscore is 6.   Skin: Skin is warm and dry. No pallor.         ED Course   Procedures  Labs Reviewed   CBC W/ AUTO DIFFERENTIAL - Abnormal; Notable for the following components:       Result Value    RDW 14.9 (*)     All other components within normal limits   COMPREHENSIVE METABOLIC PANEL - Abnormal; Notable for the  following components:    BUN, Bld 7 (*)     All other components within normal limits   TROPONIN I     EKG Readings: (Independently Interpreted)   Normal sinus rhythm.  Ventricular rate 87 beats per minute. Normal axis.  Normal QRS and QT intervals.  No ST segment elevation or depression.  Normal T-wave morphology.  Possible biatrial enlargement.  No previous studies for comparison.       Imaging Results          X-Ray Chest 1 View (Final result)  Result time 01/09/19 13:08:17    Final result by Tylor Norris MD (01/09/19 13:08:17)                 Impression:      1. 9 mm left upper lobe nodular airspace opacity with density suggestive of calcified granuloma versus calcification within a parenchymal scar.  2. Biapical reticulonodular airspace opacities likely reflects a combination of chronic scarring and/or chronic fibrotic/emphysematous changes in the setting of chronic obstructive physiology.  However, underlying parenchymal nodules cannot be completely excluded.      Electronically signed by: Tylor Norris  Date:    01/09/2019  Time:    13:08             Narrative:    EXAMINATION:  XR CHEST 1 VIEW    CLINICAL HISTORY:  Chest pain    TECHNIQUE:  Single PA view of the chest    COMPARISON:  The    FINDINGS:  Cardiomediastinal silhouette is within normal limits.    9 mm left upper lobe nodular airspace opacity.  Biapical reticulonodular airspace opacities.  Chronic interstitial coarsening, hyperexpanded lung fields and flattening of the bilateral hemidiaphragms with at least moderate centrilobular emphysematous changes.  No overt interstitial edema, sizable pleural effusion or pneumothorax.    Multilevel degenerative changes of the imaged spine.                                 Medical Decision Making:   History:   Old Medical Records: I decided to obtain old medical records.  Independently Interpreted Test(s):   I have ordered and independently interpreted EKG Reading(s) - see prior notes  Clinical Tests:   Lab  Tests: Ordered and Reviewed  Radiological Study: Ordered and Reviewed  Medical Tests: Ordered and Reviewed  ED Management:  1450  The patient states that he is feeling much better.  He is resting comfortably.  He is in no respiratory distress. He still has faint scattered wheezing but exam is much improved from initial eval.  Medical decision making:  This patient was evaluated for several days of productive cough with associated shortness of breath and chest discomfort.  He is afebrile with stable vital signs.  He is in no respiratory distress. He did have scattered wheezing on initial examination. He received nebulized breathing treatments and had improvement both on auscultation of the lungs and subjectively.  Chest radiograph is negative for acute processes.  He has no leukocytosis or other concerning findings on lab review.  Presentation and exam are most consistent with acute bronchitis.  He is stable for discharge. He will be prescribed albuterol MDI, Zithromax Z-Emerson, and cough medication.  He has been instructed to follow up with his PCP within the next few days.  He has been instructed to return to the ED immediately for worsened symptoms or for any other concerns.            Scribe Attestation:   Scribe #1: I performed the above scribed service and the documentation accurately describes the services I performed. I attest to the accuracy of the note.    Attending Attestation:           Physician Attestation for Scribe:  Physician Attestation Statement for Scribe #1: I, Sonido Kelley III, MD, reviewed documentation, as scribed by Queenie Lo in my presence, and it is both accurate and complete.                    Clinical Impression:   The primary encounter diagnosis was Acute bronchitis, unspecified organism. A diagnosis of Pleurisy was also pertinent to this visit.      Disposition:   Disposition: Discharged  Condition: Stable                        Sonido Kelley III, MD  01/09/19 5227

## 2019-05-31 ENCOUNTER — HOSPITAL ENCOUNTER (EMERGENCY)
Facility: HOSPITAL | Age: 74
Discharge: HOME | End: 2019-06-01
Attending: EMERGENCY MEDICINE
Payer: COMMERCIAL

## 2019-05-31 DIAGNOSIS — R10.9 NONSPECIFIC ABDOMINAL PAIN: Primary | ICD-10-CM

## 2019-05-31 DIAGNOSIS — R11.2 NON-INTRACTABLE VOMITING WITH NAUSEA, UNSPECIFIED VOMITING TYPE: ICD-10-CM

## 2019-05-31 PROCEDURE — 99284 EMERGENCY DEPT VISIT MOD MDM: CPT | Mod: 25

## 2019-05-31 PROCEDURE — 96374 THER/PROPH/DIAG INJ IV PUSH: CPT

## 2019-05-31 PROCEDURE — 96361 HYDRATE IV INFUSION ADD-ON: CPT

## 2019-06-01 VITALS
HEART RATE: 71 BPM | HEIGHT: 78 IN | OXYGEN SATURATION: 98 % | SYSTOLIC BLOOD PRESSURE: 156 MMHG | RESPIRATION RATE: 18 BRPM | TEMPERATURE: 98 F | BODY MASS INDEX: 12.73 KG/M2 | DIASTOLIC BLOOD PRESSURE: 71 MMHG | WEIGHT: 110 LBS

## 2019-06-01 LAB
ALBUMIN SERPL BCP-MCNC: 4.5 G/DL (ref 3.5–5.2)
ALP SERPL-CCNC: 123 U/L (ref 55–135)
ALT SERPL W/O P-5'-P-CCNC: 15 U/L (ref 10–44)
ANION GAP SERPL CALC-SCNC: 12 MMOL/L (ref 8–16)
AST SERPL-CCNC: 20 U/L (ref 10–40)
BASOPHILS # BLD AUTO: 0.02 K/UL (ref 0–0.2)
BASOPHILS NFR BLD: 0.2 % (ref 0–1.9)
BILIRUB SERPL-MCNC: 0.6 MG/DL (ref 0.1–1)
BILIRUB UR QL STRIP: NEGATIVE
BUN SERPL-MCNC: 15 MG/DL (ref 8–23)
CALCIUM SERPL-MCNC: 10.4 MG/DL (ref 8.7–10.5)
CHLORIDE SERPL-SCNC: 103 MMOL/L (ref 95–110)
CLARITY UR: CLEAR
CO2 SERPL-SCNC: 26 MMOL/L (ref 23–29)
COLOR UR: YELLOW
CREAT SERPL-MCNC: 1.1 MG/DL (ref 0.5–1.4)
DIFFERENTIAL METHOD: ABNORMAL
EOSINOPHIL # BLD AUTO: 0.2 K/UL (ref 0–0.5)
EOSINOPHIL NFR BLD: 1.6 % (ref 0–8)
ERYTHROCYTE [DISTWIDTH] IN BLOOD BY AUTOMATED COUNT: 14.5 % (ref 11.5–14.5)
EST. GFR  (AFRICAN AMERICAN): >60 ML/MIN/1.73 M^2
EST. GFR  (NON AFRICAN AMERICAN): >60 ML/MIN/1.73 M^2
GLUCOSE SERPL-MCNC: 104 MG/DL (ref 70–110)
GLUCOSE UR QL STRIP: NEGATIVE
HCT VFR BLD AUTO: 48.9 % (ref 40–54)
HGB BLD-MCNC: 16.5 G/DL (ref 14–18)
HGB UR QL STRIP: NEGATIVE
KETONES UR QL STRIP: NEGATIVE
LEUKOCYTE ESTERASE UR QL STRIP: NEGATIVE
LIPASE SERPL-CCNC: 55 U/L (ref 4–60)
LYMPHOCYTES # BLD AUTO: 1.8 K/UL (ref 1–4.8)
LYMPHOCYTES NFR BLD: 19.4 % (ref 18–48)
MCH RBC QN AUTO: 30.3 PG (ref 27–31)
MCHC RBC AUTO-ENTMCNC: 33.7 G/DL (ref 32–36)
MCV RBC AUTO: 90 FL (ref 82–98)
MONOCYTES # BLD AUTO: 0.3 K/UL (ref 0.3–1)
MONOCYTES NFR BLD: 3.4 % (ref 4–15)
NEUTROPHILS # BLD AUTO: 6.9 K/UL (ref 1.8–7.7)
NEUTROPHILS NFR BLD: 75.4 % (ref 38–73)
NITRITE UR QL STRIP: NEGATIVE
PH UR STRIP: 7 [PH] (ref 5–8)
PLATELET # BLD AUTO: 225 K/UL (ref 150–350)
PMV BLD AUTO: 10.3 FL (ref 9.2–12.9)
POTASSIUM SERPL-SCNC: 3.7 MMOL/L (ref 3.5–5.1)
PROT SERPL-MCNC: 8.3 G/DL (ref 6–8.4)
PROT UR QL STRIP: NEGATIVE
RBC # BLD AUTO: 5.45 M/UL (ref 4.6–6.2)
SODIUM SERPL-SCNC: 141 MMOL/L (ref 136–145)
SP GR UR STRIP: 1.02 (ref 1–1.03)
URN SPEC COLLECT METH UR: NORMAL
UROBILINOGEN UR STRIP-ACNC: NEGATIVE EU/DL
WBC # BLD AUTO: 9.12 K/UL (ref 3.9–12.7)

## 2019-06-01 PROCEDURE — 81003 URINALYSIS AUTO W/O SCOPE: CPT

## 2019-06-01 PROCEDURE — 80053 COMPREHEN METABOLIC PANEL: CPT

## 2019-06-01 PROCEDURE — 85025 COMPLETE CBC W/AUTO DIFF WBC: CPT

## 2019-06-01 PROCEDURE — 25000003 PHARM REV CODE 250: Performed by: PHYSICIAN ASSISTANT

## 2019-06-01 PROCEDURE — 63600175 PHARM REV CODE 636 W HCPCS: Performed by: PHYSICIAN ASSISTANT

## 2019-06-01 PROCEDURE — 83690 ASSAY OF LIPASE: CPT

## 2019-06-01 RX ORDER — ACETAMINOPHEN 500 MG
1000 TABLET ORAL
Status: COMPLETED | OUTPATIENT
Start: 2019-06-01 | End: 2019-06-01

## 2019-06-01 RX ORDER — ONDANSETRON 4 MG/1
4 TABLET, FILM COATED ORAL EVERY 6 HOURS
Qty: 6 TABLET | Refills: 0 | OUTPATIENT
Start: 2019-06-01 | End: 2022-10-09

## 2019-06-01 RX ORDER — ONDANSETRON 2 MG/ML
4 INJECTION INTRAMUSCULAR; INTRAVENOUS
Status: COMPLETED | OUTPATIENT
Start: 2019-06-01 | End: 2019-06-01

## 2019-06-01 RX ADMIN — ACETAMINOPHEN 1000 MG: 500 TABLET, FILM COATED ORAL at 12:06

## 2019-06-01 RX ADMIN — ONDANSETRON 4 MG: 2 INJECTION INTRAMUSCULAR; INTRAVENOUS at 12:06

## 2019-06-01 RX ADMIN — SODIUM CHLORIDE 1000 ML: 0.9 INJECTION, SOLUTION INTRAVENOUS at 12:06

## 2019-06-01 NOTE — ED PROVIDER NOTES
Encounter Date: 5/31/2019       History     Chief Complaint   Patient presents with    Abdominal Pain      and cramping, after eating crawfish, reports vomiting x few episodes before EMS arrival      Patient is a 74-year-old male presents to the ER for evaluation of abdominal pain. Patient states that he had a large amount of crawfish at around 2:00 pm. this afternoon.  He states that this evening he had multiple episodes of vomiting with associated abdominal cramping.  He denies any associated diarrhea.  Patient states that he tried Pepto-Bismol but was unable to keep the medication down.  He denies any associated UTI symptoms including dysuria material.  No flank pain. No fevers or chills at home.  No sick contact.  Unsure of other people got sick after eating the crawfish.  No changes in medications otherwise.  Denies any prior abdominal surgeries.    The history is provided by the patient.     Review of patient's allergies indicates:   Allergen Reactions    Penicillins Hives     Past Medical History:   Diagnosis Date    Prostate atrophy      History reviewed. No pertinent surgical history.  No family history on file.  Social History     Tobacco Use    Smoking status: Current Some Day Smoker     Packs/day: 1.00     Types: Cigarettes   Substance Use Topics    Alcohol use: Yes     Alcohol/week: 0.6 oz     Types: 1 Cans of beer per week     Comment: every night    Drug use: No     Review of Systems   Constitutional: Negative for chills and fever.   HENT: Negative for congestion.    Respiratory: Negative for cough and shortness of breath.    Cardiovascular: Negative for chest pain.   Gastrointestinal: Positive for abdominal pain and nausea. Negative for constipation and diarrhea.   Genitourinary: Negative for dysuria, flank pain and hematuria.   Musculoskeletal: Negative for myalgias.   Skin: Negative for rash.   Neurological: Negative for dizziness, weakness and light-headedness.   Hematological: Does not  bruise/bleed easily.   Psychiatric/Behavioral: Negative for confusion.       Physical Exam     Initial Vitals [05/31/19 2207]   BP Pulse Resp Temp SpO2   (!) 172/66 66 18 97.7 °F (36.5 °C) 99 %      MAP       --         Physical Exam    Vitals reviewed.  Constitutional: He appears well-developed and well-nourished. He is not diaphoretic. No distress.   HENT:   Head: Normocephalic and atraumatic.   Eyes: Conjunctivae and EOM are normal.   Neck: Neck supple.   Cardiovascular: Normal rate, regular rhythm, normal heart sounds and intact distal pulses.   Pulmonary/Chest: Breath sounds normal.   Abdominal: Soft. Normal appearance. He exhibits no distension. There is generalized tenderness. There is no rigidity, no rebound, no guarding and no CVA tenderness.   Neurological: He is alert and oriented to person, place, and time.   Skin: Skin is warm and dry.         ED Course   Procedures  Labs Reviewed   CBC W/ AUTO DIFFERENTIAL - Abnormal; Notable for the following components:       Result Value    Gran% 75.4 (*)     Mono% 3.4 (*)     All other components within normal limits   COMPREHENSIVE METABOLIC PANEL   LIPASE   URINALYSIS, REFLEX TO URINE CULTURE    Narrative:     Preferred Collection Type->Urine, Clean Catch          Imaging Results    None                APC / Resident Notes:   Patient seen in the ER promptly upon arrival.  He is afebrile, no acute distress. Physical examination reveals mild diffuse tenderness on palpation throughout the abdomen.  Abdomen soft, nondistended. No CVA tenderness. IV access was established, labs ordered, fluids given.  Patient was given Tylenol and Zofran for discomfort.    Laboratory studies show normal white count 9.1.  Hemoglobin is stable. Chemistries were unremarkable, normal liver and kidney functions..  Urinalysis does not show evidence of infection or blood.  Lipase normal    Upon reassessment patient is resting comfortably.  Serial abdominal exams were unremarkable.  Patient  has not had any episodes of vomiting in the ED.  Will prescribed home on Zofran to use as directed.  Patient advised to have a bland diet and slowly progress diet as tolerated.  Symptoms likely secondary to gastroenteritis.  Patient agreeable to this treatment plan.  Stable for discharge and close follow-up at this time.                 Clinical Impression:       ICD-10-CM ICD-9-CM   1. Nonspecific abdominal pain R10.9 789.00   2. Non-intractable vomiting with nausea, unspecified vomiting type R11.2 787.01         Disposition:   Disposition: Discharged  Condition: Stable                        Jailyn Toledo PA-C  06/01/19 0212

## 2019-06-01 NOTE — DISCHARGE INSTRUCTIONS
Please have a bland diet and slowly progress her diet as tolerated.  Take medication as prescribed.  Follow up with family doctor this week.  Return to the ER if your symptoms worsen

## 2022-10-09 ENCOUNTER — HOSPITAL ENCOUNTER (EMERGENCY)
Facility: HOSPITAL | Age: 77
Discharge: HOME | End: 2022-10-09
Attending: EMERGENCY MEDICINE
Payer: MEDICARE

## 2022-10-09 VITALS
TEMPERATURE: 99 F | BODY MASS INDEX: 19.29 KG/M2 | RESPIRATION RATE: 15 BRPM | SYSTOLIC BLOOD PRESSURE: 181 MMHG | HEIGHT: 66 IN | WEIGHT: 120 LBS | HEART RATE: 78 BPM | DIASTOLIC BLOOD PRESSURE: 80 MMHG | OXYGEN SATURATION: 95 %

## 2022-10-09 DIAGNOSIS — R06.02 SHORTNESS OF BREATH: ICD-10-CM

## 2022-10-09 DIAGNOSIS — J45.901 EXACERBATION OF ASTHMA, UNSPECIFIED ASTHMA SEVERITY, UNSPECIFIED WHETHER PERSISTENT: Primary | ICD-10-CM

## 2022-10-09 LAB
ALBUMIN SERPL BCP-MCNC: 3.6 G/DL (ref 3.5–5.2)
ALP SERPL-CCNC: 112 U/L (ref 55–135)
ALT SERPL W/O P-5'-P-CCNC: 9 U/L (ref 10–44)
ANION GAP SERPL CALC-SCNC: 11 MMOL/L (ref 8–16)
APTT BLDCRRT: 26.5 SEC (ref 21–32)
AST SERPL-CCNC: 17 U/L (ref 10–40)
BASOPHILS # BLD AUTO: 0.05 K/UL (ref 0–0.2)
BASOPHILS NFR BLD: 0.7 % (ref 0–1.9)
BILIRUB SERPL-MCNC: 0.7 MG/DL (ref 0.1–1)
BNP SERPL-MCNC: <10 PG/ML (ref 0–99)
BUN SERPL-MCNC: 5 MG/DL (ref 8–23)
CALCIUM SERPL-MCNC: 9.3 MG/DL (ref 8.7–10.5)
CHLORIDE SERPL-SCNC: 102 MMOL/L (ref 95–110)
CO2 SERPL-SCNC: 26 MMOL/L (ref 23–29)
CREAT SERPL-MCNC: 1 MG/DL (ref 0.5–1.4)
DIFFERENTIAL METHOD: NORMAL
EOSINOPHIL # BLD AUTO: 0.5 K/UL (ref 0–0.5)
EOSINOPHIL NFR BLD: 6.6 % (ref 0–8)
ERYTHROCYTE [DISTWIDTH] IN BLOOD BY AUTOMATED COUNT: 14.1 % (ref 11.5–14.5)
EST. GFR  (NO RACE VARIABLE): >60 ML/MIN/1.73 M^2
GLUCOSE SERPL-MCNC: 114 MG/DL (ref 70–110)
HCT VFR BLD AUTO: 49.8 % (ref 40–54)
HGB BLD-MCNC: 17.3 G/DL (ref 14–18)
IMM GRANULOCYTES # BLD AUTO: 0.01 K/UL (ref 0–0.04)
IMM GRANULOCYTES NFR BLD AUTO: 0.1 % (ref 0–0.5)
INR PPP: 1.1 (ref 0.8–1.2)
LYMPHOCYTES # BLD AUTO: 2 K/UL (ref 1–4.8)
LYMPHOCYTES NFR BLD: 28.7 % (ref 18–48)
MAGNESIUM SERPL-MCNC: 1.8 MG/DL (ref 1.6–2.6)
MCH RBC QN AUTO: 30.9 PG (ref 27–31)
MCHC RBC AUTO-ENTMCNC: 34.7 G/DL (ref 32–36)
MCV RBC AUTO: 89 FL (ref 82–98)
MONOCYTES # BLD AUTO: 0.7 K/UL (ref 0.3–1)
MONOCYTES NFR BLD: 10.2 % (ref 4–15)
NEUTROPHILS # BLD AUTO: 3.7 K/UL (ref 1.8–7.7)
NEUTROPHILS NFR BLD: 53.7 % (ref 38–73)
NRBC BLD-RTO: 0 /100 WBC
PLATELET # BLD AUTO: 192 K/UL (ref 150–450)
PMV BLD AUTO: 9.8 FL (ref 9.2–12.9)
POTASSIUM SERPL-SCNC: 3.5 MMOL/L (ref 3.5–5.1)
PROT SERPL-MCNC: 6.9 G/DL (ref 6–8.4)
PROTHROMBIN TIME: 11.3 SEC (ref 9–12.5)
RBC # BLD AUTO: 5.6 M/UL (ref 4.6–6.2)
SODIUM SERPL-SCNC: 139 MMOL/L (ref 136–145)
TROPONIN I SERPL DL<=0.01 NG/ML-MCNC: <0.006 NG/ML (ref 0–0.03)
TSH SERPL DL<=0.005 MIU/L-ACNC: 1.97 UIU/ML (ref 0.4–4)
WBC # BLD AUTO: 6.86 K/UL (ref 3.9–12.7)

## 2022-10-09 PROCEDURE — 84484 ASSAY OF TROPONIN QUANT: CPT | Performed by: EMERGENCY MEDICINE

## 2022-10-09 PROCEDURE — 85730 THROMBOPLASTIN TIME PARTIAL: CPT | Performed by: EMERGENCY MEDICINE

## 2022-10-09 PROCEDURE — 85025 COMPLETE CBC W/AUTO DIFF WBC: CPT | Performed by: EMERGENCY MEDICINE

## 2022-10-09 PROCEDURE — 80053 COMPREHEN METABOLIC PANEL: CPT | Performed by: EMERGENCY MEDICINE

## 2022-10-09 PROCEDURE — 93005 ELECTROCARDIOGRAM TRACING: CPT

## 2022-10-09 PROCEDURE — 25000242 PHARM REV CODE 250 ALT 637 W/ HCPCS: Performed by: EMERGENCY MEDICINE

## 2022-10-09 PROCEDURE — 83880 ASSAY OF NATRIURETIC PEPTIDE: CPT | Performed by: EMERGENCY MEDICINE

## 2022-10-09 PROCEDURE — 85610 PROTHROMBIN TIME: CPT | Performed by: EMERGENCY MEDICINE

## 2022-10-09 PROCEDURE — 84443 ASSAY THYROID STIM HORMONE: CPT | Performed by: EMERGENCY MEDICINE

## 2022-10-09 PROCEDURE — 99285 EMERGENCY DEPT VISIT HI MDM: CPT | Mod: 25

## 2022-10-09 PROCEDURE — 94640 AIRWAY INHALATION TREATMENT: CPT | Mod: XB

## 2022-10-09 PROCEDURE — 83735 ASSAY OF MAGNESIUM: CPT | Performed by: EMERGENCY MEDICINE

## 2022-10-09 RX ORDER — AZITHROMYCIN 250 MG/1
TABLET, FILM COATED ORAL
Qty: 6 TABLET | Refills: 0 | Status: SHIPPED | OUTPATIENT
Start: 2022-10-09 | End: 2022-10-14

## 2022-10-09 RX ORDER — ALBUTEROL SULFATE 2.5 MG/.5ML
2.5 SOLUTION RESPIRATORY (INHALATION) EVERY 4 HOURS PRN
Qty: 30 EACH | Refills: 0 | Status: SHIPPED | OUTPATIENT
Start: 2022-10-09 | End: 2023-04-15

## 2022-10-09 RX ORDER — IPRATROPIUM BROMIDE AND ALBUTEROL SULFATE 2.5; .5 MG/3ML; MG/3ML
3 SOLUTION RESPIRATORY (INHALATION)
Status: COMPLETED | OUTPATIENT
Start: 2022-10-09 | End: 2022-10-09

## 2022-10-09 RX ORDER — ALBUTEROL SULFATE 90 UG/1
2 AEROSOL, METERED RESPIRATORY (INHALATION)
Status: COMPLETED | OUTPATIENT
Start: 2022-10-09 | End: 2022-10-09

## 2022-10-09 RX ORDER — PREDNISONE 20 MG/1
40 TABLET ORAL DAILY
Qty: 10 TABLET | Refills: 0 | Status: SHIPPED | OUTPATIENT
Start: 2022-10-09 | End: 2022-10-14

## 2022-10-09 RX ADMIN — IPRATROPIUM BROMIDE AND ALBUTEROL SULFATE 3 ML: 2.5; .5 SOLUTION RESPIRATORY (INHALATION) at 02:10

## 2022-10-09 RX ADMIN — ALBUTEROL SULFATE 2 PUFF: 90 AEROSOL, METERED RESPIRATORY (INHALATION) at 03:10

## 2022-10-09 NOTE — DISCHARGE INSTRUCTIONS
Please return for any worsening symptoms, shortness of breath, fever, chest pain or any other concerns. Please follow up closely with your primary care doctor as well as your pulmonologist to discuss recent ED visit.     Thank you for coming to our Emergency Department today. As we discussed, it is important to remember that some problems are difficult to diagnose and may not be found during your Emergency Department visit. Be sure to follow up with your primary care doctor and review all labs/imaging/tests that were performed during this visit with them. Some labs/tests may be outside of the normal range and require non-emergent follow-up and further investigation to help diagnose/exclude/prevent complications or other medical conditions.    If you do not have a primary care doctor, you may contact the one listed on your discharge paperwork or you may also call the [facility] Appointment Desk at [phone] to schedule an appointment and establish care with one. It is important to your health that you have a primary care doctor.    Please take all medications as directed. All medications may potentially have side-effects and it is impossible to predict which medications may give you side-effects or what side-effects (if any) they will give you.. If you feel that you are having a negative effect or side-effect of any medication you should immediately stop taking them and seek medical attention. If you feel that you are having a life-threatening reaction call 911.    Return to the ER with any questions/concerns, new/concerning symptoms, worsening or failure to improve.     Do not drive, swim, climb to height, take a bath or make any important decisions for 24 hours if you have received any pain medications, sedatives or mood altering drugs during your ER visit.

## 2022-10-09 NOTE — PROVIDER PROGRESS NOTES - EMERGENCY DEPT.
Patient signed out to me by Dr. Clark pending reassessment.    76 yo male with shortness of breath.  History of asthma vs COPD -- long tobacco history.      ED workup reassuring and patient improved with ED treatment.    On my reassessment, patient stated he felt well enough to go home.  Minimal wheezing to exam.  I have instructed patient to take meds as rxed by Dr. Clark and f/u to primary care team.    Brandie Loera

## 2022-10-09 NOTE — ED PROVIDER NOTES
Encounter Date: 10/9/2022       History     Chief Complaint   Patient presents with    Shortness of Breath     PT reports SOB x 7 hours with no relief from inhaler at home. Pt received Solumedrol 125mg and Duo Neb from EMS. Pt is not in distress upon arrival to the ED. Hx of asthma     77-year-old male with self-reported history of asthma, BPH presents with shortness of breath for the last 7 hours.  Patient states his shortness of breath has been slowly increasing over the last few days however this evening it became worse.  He was using his albuterol inhaler however this was not improving.  He is to be able to walk from his house to outside without any shortness of breath.  However now he is getting short of breath with just walking 10-15 feet.  He also reports shortness of breath with smoking cigarettes.  He has not had a cigarette in 3 days previously smoked 1/2 pack per day for 60 years.  He reports he has not seen a pulmonologist but is actually scheduled to see Dr. Garcia at [facility] on Tuesday.  He reports he had asthma as a child however it resolved his late 20s and then came back patient states in his 50s.  He has not been formally diagnosed with COPD.  He reports cough which is present when he gets asthma flares, dry.  He denies any fever, chest pain, sick contacts.  He states he lives alone.  He received 3 COVID vaccines.  Patient was significant short of breath upon EMS arrival, they started him on DuoNebs gave him 125 mg of Solu-Medrol with improvement of his symptoms.  He is now able to breathe more comfortably.  She denies any surgeries.  He reports he is allergic to penicillin which gives him hives.  He only takes albuterol inhaler and Flomax at home.  He drinks occasional wine, denies any illicit drugs.  He states he takes occasional over-the-counter sleeping pills to help him sleep.  Patient reports he has an oxygen concentrator at home and wears 3 L nasal cannula at night when he is feeling short  of breath.  He was prescribed this in [place], [place].  Per chart review he was recently seen on 10/ 6 by primary care doctor for asthma exacerbation and was started on albuterol, Advair, Singulair and referred to pulmonology.    Review of patient's allergies indicates:   Allergen Reactions    Penicillins Hives     Past Medical History:   Diagnosis Date    Prostate atrophy      No past surgical history on file.  No family history on file.  Social History     Tobacco Use    Smoking status: Some Days     Packs/day: 1.00     Types: Cigarettes   Substance Use Topics    Alcohol use: Yes     Alcohol/week: 1.0 standard drink     Types: 1 Cans of beer per week     Comment: every night    Drug use: No     Review of Systems   Constitutional:  Negative for chills, diaphoresis and fever.   HENT:  Negative for sore throat.    Eyes:  Negative for photophobia and visual disturbance.   Respiratory:  Positive for cough and shortness of breath.    Cardiovascular:  Negative for chest pain and leg swelling.   Gastrointestinal:  Negative for abdominal pain, blood in stool, constipation, diarrhea, nausea and vomiting.   Genitourinary:  Negative for dysuria, frequency, hematuria and urgency.   Musculoskeletal:  Negative for neck pain and neck stiffness.   Skin:  Negative for rash and wound.   Neurological:  Negative for weakness, light-headedness, numbness and headaches.   Hematological:  Does not bruise/bleed easily.   Psychiatric/Behavioral:  Negative for confusion and suicidal ideas.      Physical Exam     Initial Vitals [10/09/22 0126]   BP Pulse Resp Temp SpO2   130/78 90 (!) 22 98.6 °F (37 °C) 96 %      MAP       --         Physical Exam    Nursing note and vitals reviewed.  Constitutional: He appears well-developed and well-nourished. He is not diaphoretic. No distress.   Thin male in no apparent distress, laughing   HENT:   Head: Normocephalic and atraumatic.   Right Ear: External ear normal.   Left Ear: External ear normal.    Mouth/Throat: Oropharynx is clear and moist. No oropharyngeal exudate.   Eyes: Conjunctivae and EOM are normal. Pupils are equal, round, and reactive to light. Right eye exhibits no discharge. Left eye exhibits no discharge.   Neck: Neck supple. No JVD present.   Normal range of motion.  Cardiovascular:  Normal rate, regular rhythm, normal heart sounds and intact distal pulses.     Exam reveals no gallop and no friction rub.       No murmur heard.  Pulmonary/Chest: No respiratory distress. He has wheezes. He has no rhonchi. He has no rales.   Mild tachypnea, slight accessory muscle usage.  Wheezing and decreased air movement bilaterally.  Patient is able speak in full sentences.  He is currently on 2 L that was placed by EMS.  He is satting 98%.   Abdominal: Abdomen is soft. Bowel sounds are normal. He exhibits no distension. There is no abdominal tenderness. There is no rebound and no guarding.   Musculoskeletal:         General: No tenderness or edema. Normal range of motion.      Cervical back: Normal range of motion and neck supple.     Lymphadenopathy:     He has no cervical adenopathy.   Neurological: He is alert and oriented to person, place, and time. He has normal strength. No cranial nerve deficit or sensory deficit. GCS score is 15. GCS eye subscore is 4. GCS verbal subscore is 5. GCS motor subscore is 6.   Skin: Skin is warm and dry. Capillary refill takes less than 2 seconds.   Psychiatric: He has a normal mood and affect. Thought content normal.       ED Course   Procedures  Labs Reviewed   COMPREHENSIVE METABOLIC PANEL - Abnormal; Notable for the following components:       Result Value    Glucose 114 (*)     BUN 5 (*)     ALT 9 (*)     All other components within normal limits   CBC W/ AUTO DIFFERENTIAL   B-TYPE NATRIURETIC PEPTIDE   TSH   TROPONIN I   PROTIME-INR   MAGNESIUM   APTT          Imaging Results              X-Ray Chest AP Portable (Final result)  Result time 10/09/22 02:50:44       Final result by Laurie Gao MD (10/09/22 02:50:44)                   Impression:      Please see above.      Electronically signed by: Laurie Gao MD  Date:    10/09/2022  Time:    02:50               Narrative:    EXAMINATION:  XR CHEST AP PORTABLE    CLINICAL HISTORY:  Shortness of breath    TECHNIQUE:  Single frontal view of the chest was performed.    COMPARISON:  01/09/2019    FINDINGS:  Cardiac monitoring leads overlie the chest.  The cardiomediastinal silhouette is slightly prominent in size.  Mediastinal structures are midline.  There is atherosclerosis of the thoracic aorta.  The lungs are hyperexpanded with diffuse coarse interstitial attenuation suggestive of underlying COPD/emphysematous change.  Superimposed component of interstitial edema or atypical infectious process would be difficult to definitively exclude.  There is biapical nodular and linear opacities which appears similar to prior chest radiograph of 2019 and could reflect pleuroparenchymal scarring.  Consider further assessment with nonemergent lung screening CT/noncontrast chest CT for more definitive evaluation of pulmonary parenchyma.  No significant volume of pleural fluid or pneumothorax identified.  Osseous structures are intact.                                       Medications   albuterol inhaler 2 puff (has no administration in time range)   albuterol-ipratropium 2.5 mg-0.5 mg/3 mL nebulizer solution 3 mL (3 mLs Nebulization Given 10/9/22 0235)   MDM    Pt presenting 2/2 wheezing and SOB c/w COPD vs asthma exacerbation. Patient started on duonebs he has been given IV steroids by EMS. Will monitor for worsening respiratory distress to considered bipap vs intubation in patient. Will reassess after treatments    Also considered but less likely:  Acs: ekg doesn't show stemi. Troponin ordered  Pna: symptoms bilaterally and no fevers.   Bronchitis: considered but hpi most c/w copd  CHF: considered. Will compare bnp to previous and cxr  for fluid overload. Possible  Pneumothorax: bilateral breath sounds    Patient looks improved after duonebs here, feels comfortable going home. Will monitor for rebound. If symptoms remain controlled will send home on azithromycin, nebulizer machine and prednisone. Patient in agreement with plan. Discussed with Dr. Loera.                            Clinical Impression:   Final diagnoses:  [R06.02] Shortness of breath  [J45.901] Exacerbation of asthma, unspecified asthma severity, unspecified whether persistent (Primary)               Angeline Clark MD  10/09/22 0312

## 2022-10-09 NOTE — ED NOTES
Bed: 04main  Expected date: 10/9/22  Expected time: 1:24 AM  Means of arrival:   Comments:  Code Bed

## 2022-12-07 ENCOUNTER — HOSPITAL ENCOUNTER (EMERGENCY)
Facility: HOSPITAL | Age: 77
Discharge: HOME | End: 2022-12-07
Attending: EMERGENCY MEDICINE
Payer: MEDICARE

## 2022-12-07 VITALS
TEMPERATURE: 98 F | HEART RATE: 79 BPM | OXYGEN SATURATION: 98 % | BODY MASS INDEX: 16.44 KG/M2 | SYSTOLIC BLOOD PRESSURE: 138 MMHG | DIASTOLIC BLOOD PRESSURE: 63 MMHG | HEIGHT: 73 IN | RESPIRATION RATE: 18 BRPM | WEIGHT: 124 LBS

## 2022-12-07 DIAGNOSIS — G58.9 COMPRESSION NEUROPATHY: ICD-10-CM

## 2022-12-07 DIAGNOSIS — R20.0 NUMBNESS OF FINGERS: Primary | ICD-10-CM

## 2022-12-07 LAB — POCT GLUCOSE: 85 MG/DL (ref 70–110)

## 2022-12-07 PROCEDURE — 99283 EMERGENCY DEPT VISIT LOW MDM: CPT

## 2022-12-07 PROCEDURE — 82962 GLUCOSE BLOOD TEST: CPT

## 2022-12-07 PROCEDURE — 93010 ELECTROCARDIOGRAM REPORT: CPT | Mod: ,,, | Performed by: INTERNAL MEDICINE

## 2022-12-07 PROCEDURE — 93010 EKG 12-LEAD: ICD-10-PCS | Mod: ,,, | Performed by: INTERNAL MEDICINE

## 2022-12-07 RX ORDER — GABAPENTIN 300 MG/1
300 CAPSULE ORAL 3 TIMES DAILY
Qty: 30 CAPSULE | Refills: 0 | Status: SHIPPED | OUTPATIENT
Start: 2022-12-07 | End: 2023-01-31

## 2022-12-07 NOTE — DISCHARGE INSTRUCTIONS
Please return immediately if you get worse or if new problems develop.  Please follow-up with the neurologist above.  You may also follow-up with your primary care doctor.  Gabapentin as directed

## 2022-12-07 NOTE — ED PROVIDER NOTES
Encounter Date: 12/7/2022    SCRIBE #1 NOTE: I, Akosua Mcdermott, am scribing for, and in the presence of,  Jordan Schwartz MD. I have scribed the following portions of the note - Other sections scribed: SHILPA WILLIAMSON.     History     Chief Complaint   Patient presents with    Numbness     The patient reports numbness to his left hand that he noticed when he woke up 2 days ago, on 12/5/2022 around 10 am. Denies headaches, difficulty speaking, changes in vision, dizziness.      A 77 y.o. male with no pertinent PMHx, presents to the ED for evaluation of constant left handed numbness that began 2 days ago. Patient reports that he woke up 2 days ago and couldn't move his hand. He states that he went to the kitchen to get something to drink and immediately dropped the glass. He states that he occasionally drinks EtOH. No other exacerbating or alleviating factors. Patient denies left arm numbness, right arm numbness, cough, shortness of breath, chest pain, fever, chills, abdominal pain, nausea, vomiting, diarrhea, dysuria, headaches, sore throat, eye pain, ear pain, rash, or any other associated symptoms.    The history is provided by the patient. No  was used.   Review of patient's allergies indicates:   Allergen Reactions    Penicillins Hives     Past Medical History:   Diagnosis Date    Prostate atrophy      No past surgical history on file.  History reviewed. No pertinent family history.  Social History     Tobacco Use    Smoking status: Some Days     Packs/day: 1.00     Types: Cigarettes   Substance Use Topics    Alcohol use: Yes     Alcohol/week: 1.0 standard drink     Types: 1 Cans of beer per week     Comment: every night    Drug use: No     Review of Systems   Constitutional:  Negative for chills, diaphoresis and fever.   HENT:  Negative for ear pain and sore throat.    Eyes:  Negative for pain.   Respiratory:  Negative for cough and shortness of breath.    Cardiovascular:  Negative for chest pain.    Gastrointestinal:  Negative for abdominal pain, diarrhea, nausea and vomiting.   Genitourinary:  Negative for dysuria.   Musculoskeletal:  Negative for back pain.        (+) Left hand numbness  (-) Left arm numbness  (-) Right arm numbness   Skin:  Negative for rash.   Neurological:  Negative for headaches.   Psychiatric/Behavioral:  Negative for confusion.      Physical Exam     Initial Vitals [12/07/22 1235]   BP Pulse Resp Temp SpO2   135/63 94 18 98.3 °F (36.8 °C) 97 %      MAP       --         Physical Exam  The patient was examined specifically for the following:   General:No significant distress, Good color, Warm and dry. Head and neck:Scalp atraumatic, Neck supple. Neurological:Appropriate conversation, Gross motor deficits. Eyes:Conjugate gaze, Clear corneas. ENT: No epistaxis. Cardiac: Regular rate and rhythm, Grossly normal heart tones. Pulmonary: Wheezing, Rales. Gastrointestinal: Abdominal tenderness, Abdominal distention. Musculoskeletal: Extremity deformity, Apparent pain with range of motion of the joints. Skin: Rash.   The findings on examination were normal except for the following:  Patient has slightly decreased sensation in the 4th fingers of the left hand.  He has good sensation in the left thumb.  Motor function is intact.  Lungs are clear.  The heart tones are normal.  The abdomen is soft.  Mental status examination, cranial nerves, motor and sensory examination are otherwise  ED Course   Procedures  Labs Reviewed   POCT GLUCOSE   Unremarkable.    Medical decision making: Given the above this patient has numbness in 4 fingers of the left hand.  This patient woke up with his symptoms.  I wonder if this is a compression neuropathy.  I will treat with gabapentin.  I will refer to follow up with Neurology.  I specifically doubt ischemic stroke.  I doubt hemorrhagic stroke.  The patient has no head pain in his otherwise well.  He does have sensation in the 4th fingers.  He reports that his  reduced.  EKG Readings: (Independently Interpreted)   This patient is in a normal sinus rhythm with a heart rate of 75.  The patient may have left ventricular hypertrophy.  Axis is normal.  There is no definite evidence of acute myocardial infarction or malignant arrhythmia.     Imaging Results    None          Medications - No data to display  Medical Decision Making:   History:   Old Medical Records: I decided to obtain old medical records.        Scribe Attestation:   Scribe #1: I performed the above scribed service and the documentation accurately describes the services I performed. I attest to the accuracy of the note.                   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 Provider    gabapentin (NEURONTIN) 300 MG capsule Take 1 capsule (300 mg total) by mouth 3 (three) times daily. 30 capsule 12/7/2022 12/7/2023 Jordan Schwartz MD          Follow-up Information       Follow up With Specialties Details Why Contact Info    Fabián Clarke MD Neurology In 1 week  [address]  [address] [place] [address] [id number] [phone]            I personally performed the services described in this documentation.  All medical record  entries made by the scribe are at my direction and in my presence.  Signed, Dr. Efren Schwartz MD  12/07/22 3087

## 2022-12-07 NOTE — ED TRIAGE NOTES
patient c/o numbness to his left hand X 2 days. . Denies headaches, difficulty speaking, changes in vision, dizziness, nvd. Pt denies cp or sob. Pt denies fever or chills. Pt nad at this time.

## 2023-01-31 ENCOUNTER — HOSPITAL ENCOUNTER (INPATIENT)
Facility: HOSPITAL | Age: 78
LOS: 6 days | Discharge: HOME HEALTH | DRG: 064 | End: 2023-02-06
Attending: EMERGENCY MEDICINE | Admitting: PSYCHIATRY & NEUROLOGY
Payer: MEDICARE

## 2023-01-31 DIAGNOSIS — R53.1 WEAKNESS: ICD-10-CM

## 2023-01-31 DIAGNOSIS — R29.898 LEFT ARM WEAKNESS: ICD-10-CM

## 2023-01-31 DIAGNOSIS — I63.511 ACUTE RIGHT MCA STROKE: Primary | ICD-10-CM

## 2023-01-31 DIAGNOSIS — R00.1 BRADYCARDIA, UNSPECIFIED: ICD-10-CM

## 2023-01-31 DIAGNOSIS — R07.9 CHEST PAIN: ICD-10-CM

## 2023-01-31 DIAGNOSIS — I63.411 EMBOLIC STROKE INVOLVING RIGHT MIDDLE CEREBRAL ARTERY: ICD-10-CM

## 2023-01-31 DIAGNOSIS — R06.02 SOB (SHORTNESS OF BREATH): ICD-10-CM

## 2023-01-31 DIAGNOSIS — I63.9 CVA (CEREBRAL VASCULAR ACCIDENT): ICD-10-CM

## 2023-01-31 PROBLEM — N40.0 BPH (BENIGN PROSTATIC HYPERPLASIA): Status: ACTIVE | Noted: 2023-01-31

## 2023-01-31 PROBLEM — G93.6 CYTOTOXIC BRAIN EDEMA: Status: ACTIVE | Noted: 2023-01-31

## 2023-01-31 PROBLEM — N42.89 PROSTATE ATROPHY: Status: ACTIVE | Noted: 2023-01-31

## 2023-01-31 PROBLEM — Z78.9 IMPAIRED ACTIVITIES OF DAILY LIVING: Status: ACTIVE | Noted: 2023-01-31

## 2023-01-31 PROBLEM — I10 HTN (HYPERTENSION): Status: ACTIVE | Noted: 2023-01-31

## 2023-01-31 PROBLEM — Z72.0 TOBACCO ABUSE: Status: ACTIVE | Noted: 2023-01-31

## 2023-01-31 PROBLEM — J45.909 ASTHMA: Status: ACTIVE | Noted: 2023-01-31

## 2023-01-31 PROBLEM — Z97.8 ENDOTRACHEALLY INTUBATED: Status: ACTIVE | Noted: 2023-01-31

## 2023-01-31 LAB
ALBUMIN SERPL BCP-MCNC: 4 G/DL (ref 3.5–5.2)
ALLENS TEST: ABNORMAL
ALLENS TEST: ABNORMAL
ALP SERPL-CCNC: 119 U/L (ref 55–135)
ALT SERPL W/O P-5'-P-CCNC: 11 U/L (ref 10–44)
ANION GAP SERPL CALC-SCNC: 9 MMOL/L (ref 8–16)
ASCENDING AORTA: 2.95 CM
AST SERPL-CCNC: 21 U/L (ref 10–40)
AV INDEX (PROSTH): 0.92
AV MEAN GRADIENT: 3 MMHG
AV PEAK GRADIENT: 6 MMHG
AV VALVE AREA: 3.12 CM2
AV VELOCITY RATIO: 0.87
BASOPHILS # BLD AUTO: 0.05 K/UL (ref 0–0.2)
BASOPHILS NFR BLD: 1.2 % (ref 0–1.9)
BILIRUB SERPL-MCNC: 0.8 MG/DL (ref 0.1–1)
BILIRUB UR QL STRIP: NEGATIVE
BSA FOR ECHO PROCEDURE: 1.7 M2
BUN SERPL-MCNC: 7 MG/DL (ref 8–23)
CALCIUM SERPL-MCNC: 9.3 MG/DL (ref 8.7–10.5)
CHLORIDE SERPL-SCNC: 102 MMOL/L (ref 95–110)
CHOLEST SERPL-MCNC: 176 MG/DL (ref 120–199)
CHOLEST/HDLC SERPL: 3.2 {RATIO} (ref 2–5)
CLARITY UR: CLEAR
CO2 SERPL-SCNC: 28 MMOL/L (ref 23–29)
COLOR UR: YELLOW
CREAT SERPL-MCNC: 1 MG/DL (ref 0.5–1.4)
CV ECHO LV RWT: 0.45 CM
DELSYS: ABNORMAL
DELSYS: ABNORMAL
DIFFERENTIAL METHOD: ABNORMAL
DOP CALC AO PEAK VEL: 1.19 M/S
DOP CALC AO VTI: 28.5 CM
DOP CALC LVOT AREA: 3.4 CM2
DOP CALC LVOT DIAMETER: 2.08 CM
DOP CALC LVOT PEAK VEL: 1.04 M/S
DOP CALC LVOT STROKE VOLUME: 88.98 CM3
DOP CALCLVOT PEAK VEL VTI: 26.2 CM
E WAVE DECELERATION TIME: 232.06 MSEC
E/A RATIO: 0.86
E/E' RATIO: 9.23 M/S
ECHO LV POSTERIOR WALL: 0.99 CM (ref 0.6–1.1)
EJECTION FRACTION: 65 %
EOSINOPHIL # BLD AUTO: 0.2 K/UL (ref 0–0.5)
EOSINOPHIL NFR BLD: 3.7 % (ref 0–8)
ERYTHROCYTE [DISTWIDTH] IN BLOOD BY AUTOMATED COUNT: 15.7 % (ref 11.5–14.5)
ERYTHROCYTE [SEDIMENTATION RATE] IN BLOOD BY WESTERGREN METHOD: 16 MM/H
ERYTHROCYTE [SEDIMENTATION RATE] IN BLOOD BY WESTERGREN METHOD: 18 MM/H
EST. GFR  (NO RACE VARIABLE): >60 ML/MIN/1.73 M^2
FIO2: 100
FIO2: 100
FRACTIONAL SHORTENING: 32 % (ref 28–44)
GLUCOSE SERPL-MCNC: 80 MG/DL (ref 70–110)
GLUCOSE UR QL STRIP: NEGATIVE
HCO3 UR-SCNC: 27.9 MMOL/L (ref 24–28)
HCO3 UR-SCNC: 29.2 MMOL/L (ref 24–28)
HCT VFR BLD AUTO: 54 % (ref 40–54)
HDLC SERPL-MCNC: 55 MG/DL (ref 40–75)
HDLC SERPL: 31.3 % (ref 20–50)
HGB BLD-MCNC: 18 G/DL (ref 14–18)
HGB UR QL STRIP: NEGATIVE
IMM GRANULOCYTES # BLD AUTO: 0.02 K/UL (ref 0–0.04)
IMM GRANULOCYTES NFR BLD AUTO: 0.5 % (ref 0–0.5)
INTERVENTRICULAR SEPTUM: 0.89 CM (ref 0.6–1.1)
IVC DIAMETER: 2.37 CM
IVRT: 98.95 MSEC
KETONES UR QL STRIP: NEGATIVE
LA MAJOR: 4.63 CM
LA MINOR: 3.75 CM
LA WIDTH: 3.8 CM
LDLC SERPL CALC-MCNC: 108.2 MG/DL (ref 63–159)
LEFT ATRIUM SIZE: 3.28 CM
LEFT ATRIUM VOLUME INDEX: 24.9 ML/M2
LEFT ATRIUM VOLUME: 43.9 CM3
LEFT INTERNAL DIMENSION IN SYSTOLE: 2.97 CM (ref 2.1–4)
LEFT VENTRICLE DIASTOLIC VOLUME INDEX: 48.84 ML/M2
LEFT VENTRICLE DIASTOLIC VOLUME: 85.96 ML
LEFT VENTRICLE MASS INDEX: 76 G/M2
LEFT VENTRICLE SYSTOLIC VOLUME INDEX: 16.9 ML/M2
LEFT VENTRICLE SYSTOLIC VOLUME: 29.7 ML
LEFT VENTRICULAR INTERNAL DIMENSION IN DIASTOLE: 4.36 CM (ref 3.5–6)
LEFT VENTRICULAR MASS: 133.79 G
LEUKOCYTE ESTERASE UR QL STRIP: NEGATIVE
LV LATERAL E/E' RATIO: 8.57 M/S
LV SEPTAL E/E' RATIO: 10 M/S
LVOT MG: 2.01 MMHG
LVOT MV: 0.66 CM/S
LYMPHOCYTES # BLD AUTO: 1.2 K/UL (ref 1–4.8)
LYMPHOCYTES NFR BLD: 28.6 % (ref 18–48)
MAGNESIUM SERPL-MCNC: 2.2 MG/DL (ref 1.6–2.6)
MCH RBC QN AUTO: 30.3 PG (ref 27–31)
MCHC RBC AUTO-ENTMCNC: 33.3 G/DL (ref 32–36)
MCV RBC AUTO: 91 FL (ref 82–98)
MODE: ABNORMAL
MODE: ABNORMAL
MONOCYTES # BLD AUTO: 0.5 K/UL (ref 0.3–1)
MONOCYTES NFR BLD: 11.9 % (ref 4–15)
MV PEAK A VEL: 0.7 M/S
MV PEAK E VEL: 0.6 M/S
MV STENOSIS PRESSURE HALF TIME: 67.3 MS
MV VALVE AREA P 1/2 METHOD: 3.27 CM2
NEUTROPHILS # BLD AUTO: 2.3 K/UL (ref 1.8–7.7)
NEUTROPHILS NFR BLD: 54.1 % (ref 38–73)
NITRITE UR QL STRIP: NEGATIVE
NONHDLC SERPL-MCNC: 121 MG/DL
NRBC BLD-RTO: 0 /100 WBC
PCO2 BLDA: 48 MMHG (ref 35–45)
PCO2 BLDA: 65.6 MMHG (ref 35–45)
PEEP: 5
PEEP: 5
PH SMN: 7.24 [PH] (ref 7.35–7.45)
PH SMN: 7.39 [PH] (ref 7.35–7.45)
PH UR STRIP: 6 [PH] (ref 5–8)
PISA TR MAX VEL: 0.97 M/S
PLATELET # BLD AUTO: 239 K/UL (ref 150–450)
PMV BLD AUTO: 10.3 FL (ref 9.2–12.9)
PO2 BLDA: 485 MMHG (ref 80–100)
PO2 BLDA: 623 MMHG (ref 80–100)
POC BE: -2 MMOL/L
POC BE: 4 MMOL/L
POC SATURATED O2: 100 % (ref 95–100)
POC SATURATED O2: 100 % (ref 95–100)
POC TCO2: 30 MMOL/L (ref 23–27)
POC TCO2: 31 MMOL/L (ref 23–27)
POTASSIUM SERPL-SCNC: 3.3 MMOL/L (ref 3.5–5.1)
PROT SERPL-MCNC: 7.9 G/DL (ref 6–8.4)
PROT UR QL STRIP: NEGATIVE
PULM VEIN S/D RATIO: 1.09
PV PEAK D VEL: 0.33 M/S
PV PEAK S VEL: 0.36 M/S
PV PEAK VELOCITY: 0.71 CM/S
RA MAJOR: 4.37 CM
RA PRESSURE: 3 MMHG
RA WIDTH: 3 CM
RBC # BLD AUTO: 5.94 M/UL (ref 4.6–6.2)
RIGHT VENTRICULAR END-DIASTOLIC DIMENSION: 3.6 CM
RV TISSUE DOPPLER FREE WALL SYSTOLIC VELOCITY 1 (APICAL 4 CHAMBER VIEW): 0.01 CM/S
SAMPLE: ABNORMAL
SAMPLE: ABNORMAL
SINUS: 3.06 CM
SITE: ABNORMAL
SITE: ABNORMAL
SODIUM SERPL-SCNC: 139 MMOL/L (ref 136–145)
SP GR UR STRIP: 1.01 (ref 1–1.03)
STJ: 2.26 CM
TDI LATERAL: 0.07 M/S
TDI SEPTAL: 0.06 M/S
TDI: 0.07 M/S
TR MAX PG: 4 MMHG
TRICUSPID ANNULAR PLANE SYSTOLIC EXCURSION: 1.92 CM
TRIGL SERPL-MCNC: 64 MG/DL (ref 30–150)
TSH SERPL DL<=0.005 MIU/L-ACNC: 2.13 UIU/ML (ref 0.4–4)
TV PEAK GRADIENT: 0.52 MMHG
TV REST PULMONARY ARTERY PRESSURE: 7 MMHG
URN SPEC COLLECT METH UR: NORMAL
UROBILINOGEN UR STRIP-ACNC: NEGATIVE EU/DL
VT: 400
VT: 470
WBC # BLD AUTO: 4.3 K/UL (ref 3.9–12.7)

## 2023-01-31 PROCEDURE — 93005 ELECTROCARDIOGRAM TRACING: CPT

## 2023-01-31 PROCEDURE — 94761 N-INVAS EAR/PLS OXIMETRY MLT: CPT

## 2023-01-31 PROCEDURE — 93010 EKG 12-LEAD: ICD-10-PCS | Mod: ,,, | Performed by: INTERNAL MEDICINE

## 2023-01-31 PROCEDURE — 99900026 HC AIRWAY MAINTENANCE (STAT)

## 2023-01-31 PROCEDURE — 25500020 PHARM REV CODE 255: Performed by: STUDENT IN AN ORGANIZED HEALTH CARE EDUCATION/TRAINING PROGRAM

## 2023-01-31 PROCEDURE — 25000003 PHARM REV CODE 250: Performed by: STUDENT IN AN ORGANIZED HEALTH CARE EDUCATION/TRAINING PROGRAM

## 2023-01-31 PROCEDURE — 20000000 HC ICU ROOM

## 2023-01-31 PROCEDURE — 82803 BLOOD GASES ANY COMBINATION: CPT

## 2023-01-31 PROCEDURE — 63600175 PHARM REV CODE 636 W HCPCS: Performed by: STUDENT IN AN ORGANIZED HEALTH CARE EDUCATION/TRAINING PROGRAM

## 2023-01-31 PROCEDURE — 83735 ASSAY OF MAGNESIUM: CPT | Performed by: EMERGENCY MEDICINE

## 2023-01-31 PROCEDURE — 99900035 HC TECH TIME PER 15 MIN (STAT)

## 2023-01-31 PROCEDURE — 82800 BLOOD PH: CPT

## 2023-01-31 PROCEDURE — 84443 ASSAY THYROID STIM HORMONE: CPT | Performed by: PHYSICIAN ASSISTANT

## 2023-01-31 PROCEDURE — 93010 ELECTROCARDIOGRAM REPORT: CPT | Mod: ,,, | Performed by: INTERNAL MEDICINE

## 2023-01-31 PROCEDURE — 27000221 HC OXYGEN, UP TO 24 HOURS

## 2023-01-31 PROCEDURE — 31500 INSERT EMERGENCY AIRWAY: CPT

## 2023-01-31 PROCEDURE — 63600175 PHARM REV CODE 636 W HCPCS

## 2023-01-31 PROCEDURE — 99223 PR INITIAL HOSPITAL CARE,LEVL III: ICD-10-PCS | Mod: ,,, | Performed by: PSYCHIATRY & NEUROLOGY

## 2023-01-31 PROCEDURE — 94002 VENT MGMT INPAT INIT DAY: CPT

## 2023-01-31 PROCEDURE — 25000242 PHARM REV CODE 250 ALT 637 W/ HCPCS: Performed by: STUDENT IN AN ORGANIZED HEALTH CARE EDUCATION/TRAINING PROGRAM

## 2023-01-31 PROCEDURE — 99223 1ST HOSP IP/OBS HIGH 75: CPT | Mod: ,,, | Performed by: PSYCHIATRY & NEUROLOGY

## 2023-01-31 PROCEDURE — 51702 INSERT TEMP BLADDER CATH: CPT

## 2023-01-31 PROCEDURE — 83036 HEMOGLOBIN GLYCOSYLATED A1C: CPT | Performed by: PHYSICIAN ASSISTANT

## 2023-01-31 PROCEDURE — 25000003 PHARM REV CODE 250

## 2023-01-31 PROCEDURE — 99291 PR CRITICAL CARE, E/M 30-74 MINUTES: ICD-10-PCS | Mod: ,,, | Performed by: NURSE PRACTITIONER

## 2023-01-31 PROCEDURE — 25000003 PHARM REV CODE 250: Performed by: NURSE PRACTITIONER

## 2023-01-31 PROCEDURE — 99285 EMERGENCY DEPT VISIT HI MDM: CPT | Mod: 25

## 2023-01-31 PROCEDURE — 99291 CRITICAL CARE FIRST HOUR: CPT | Mod: ,,, | Performed by: NURSE PRACTITIONER

## 2023-01-31 PROCEDURE — 63600175 PHARM REV CODE 636 W HCPCS: Performed by: PHYSICIAN ASSISTANT

## 2023-01-31 PROCEDURE — 94640 AIRWAY INHALATION TREATMENT: CPT

## 2023-01-31 PROCEDURE — 80053 COMPREHEN METABOLIC PANEL: CPT | Performed by: EMERGENCY MEDICINE

## 2023-01-31 PROCEDURE — 80061 LIPID PANEL: CPT | Performed by: EMERGENCY MEDICINE

## 2023-01-31 PROCEDURE — 25000003 PHARM REV CODE 250: Performed by: PHYSICIAN ASSISTANT

## 2023-01-31 PROCEDURE — 81003 URINALYSIS AUTO W/O SCOPE: CPT | Performed by: EMERGENCY MEDICINE

## 2023-01-31 PROCEDURE — 36600 WITHDRAWAL OF ARTERIAL BLOOD: CPT

## 2023-01-31 PROCEDURE — 85025 COMPLETE CBC W/AUTO DIFF WBC: CPT | Performed by: EMERGENCY MEDICINE

## 2023-01-31 PROCEDURE — 27200966 HC CLOSED SUCTION SYSTEM

## 2023-01-31 PROCEDURE — 63600175 PHARM REV CODE 636 W HCPCS: Performed by: NURSE PRACTITIONER

## 2023-01-31 RX ORDER — ETOMIDATE 2 MG/ML
INJECTION INTRAVENOUS
Status: COMPLETED
Start: 2023-01-31 | End: 2023-01-31

## 2023-01-31 RX ORDER — SODIUM CHLORIDE 0.9 % (FLUSH) 0.9 %
10 SYRINGE (ML) INJECTION
Status: DISCONTINUED | OUTPATIENT
Start: 2023-01-31 | End: 2023-02-06 | Stop reason: HOSPADM

## 2023-01-31 RX ORDER — CLOPIDOGREL BISULFATE 75 MG/1
75 TABLET ORAL DAILY
Status: DISCONTINUED | OUTPATIENT
Start: 2023-01-31 | End: 2023-01-31

## 2023-01-31 RX ORDER — SODIUM CHLORIDE 9 MG/ML
INJECTION, SOLUTION INTRAVENOUS CONTINUOUS
Status: DISCONTINUED | OUTPATIENT
Start: 2023-02-01 | End: 2023-02-04

## 2023-01-31 RX ORDER — ATORVASTATIN CALCIUM 40 MG/1
40 TABLET, FILM COATED ORAL DAILY
Status: DISCONTINUED | OUTPATIENT
Start: 2023-02-01 | End: 2023-02-01

## 2023-01-31 RX ORDER — TAMSULOSIN HYDROCHLORIDE 0.4 MG/1
0.4 CAPSULE ORAL DAILY
Status: DISCONTINUED | OUTPATIENT
Start: 2023-02-01 | End: 2023-02-01

## 2023-01-31 RX ORDER — SUCCINYLCHOLINE CHLORIDE 20 MG/ML
INJECTION INTRAMUSCULAR; INTRAVENOUS
Status: COMPLETED
Start: 2023-01-31 | End: 2023-01-31

## 2023-01-31 RX ORDER — SUCCINYLCHOLINE CHLORIDE 20 MG/ML
INJECTION INTRAMUSCULAR; INTRAVENOUS
Status: DISCONTINUED
Start: 2023-01-31 | End: 2023-01-31 | Stop reason: WASHOUT

## 2023-01-31 RX ORDER — AMOXICILLIN 250 MG
1 CAPSULE ORAL DAILY PRN
Status: DISCONTINUED | OUTPATIENT
Start: 2023-01-31 | End: 2023-02-01

## 2023-01-31 RX ORDER — ATORVASTATIN CALCIUM 40 MG/1
40 TABLET, FILM COATED ORAL DAILY
Status: DISCONTINUED | OUTPATIENT
Start: 2023-01-31 | End: 2023-01-31

## 2023-01-31 RX ORDER — ASPIRIN 81 MG/1
81 TABLET ORAL DAILY
Status: DISCONTINUED | OUTPATIENT
Start: 2023-02-01 | End: 2023-01-31

## 2023-01-31 RX ORDER — ACETAMINOPHEN 500 MG
500 TABLET ORAL EVERY 6 HOURS PRN
Status: DISCONTINUED | OUTPATIENT
Start: 2023-01-31 | End: 2023-02-06 | Stop reason: HOSPADM

## 2023-01-31 RX ORDER — ONDANSETRON 2 MG/ML
4 INJECTION INTRAMUSCULAR; INTRAVENOUS EVERY 8 HOURS PRN
Status: DISCONTINUED | OUTPATIENT
Start: 2023-01-31 | End: 2023-02-06 | Stop reason: HOSPADM

## 2023-01-31 RX ORDER — CLOPIDOGREL BISULFATE 75 MG/1
75 TABLET ORAL DAILY
Status: DISCONTINUED | OUTPATIENT
Start: 2023-02-01 | End: 2023-01-31

## 2023-01-31 RX ORDER — FENTANYL CITRATE 50 UG/ML
50 INJECTION, SOLUTION INTRAMUSCULAR; INTRAVENOUS
Status: DISPENSED | OUTPATIENT
Start: 2023-01-31 | End: 2023-01-31

## 2023-01-31 RX ORDER — PROPOFOL 10 MG/ML
0-50 INJECTION, EMULSION INTRAVENOUS CONTINUOUS
Status: DISCONTINUED | OUTPATIENT
Start: 2023-01-31 | End: 2023-02-01

## 2023-01-31 RX ORDER — FAMOTIDINE 20 MG/50ML
20 INJECTION, SOLUTION INTRAVENOUS ONCE
Status: DISCONTINUED | OUTPATIENT
Start: 2023-01-31 | End: 2023-01-31

## 2023-01-31 RX ORDER — TALC
6 POWDER (GRAM) TOPICAL NIGHTLY PRN
Status: DISCONTINUED | OUTPATIENT
Start: 2023-01-31 | End: 2023-01-31

## 2023-01-31 RX ORDER — ASPIRIN 81 MG/1
81 TABLET ORAL DAILY
Status: DISCONTINUED | OUTPATIENT
Start: 2023-01-31 | End: 2023-01-31

## 2023-01-31 RX ORDER — FENTANYL CITRATE 50 UG/ML
50 INJECTION, SOLUTION INTRAMUSCULAR; INTRAVENOUS
Status: DISCONTINUED | OUTPATIENT
Start: 2023-01-31 | End: 2023-01-31

## 2023-01-31 RX ORDER — FENTANYL CITRATE 50 UG/ML
25 INJECTION, SOLUTION INTRAMUSCULAR; INTRAVENOUS
Status: DISCONTINUED | OUTPATIENT
Start: 2023-02-01 | End: 2023-02-01

## 2023-01-31 RX ORDER — SODIUM CHLORIDE, SODIUM LACTATE, POTASSIUM CHLORIDE, CALCIUM CHLORIDE 600; 310; 30; 20 MG/100ML; MG/100ML; MG/100ML; MG/100ML
INJECTION, SOLUTION INTRAVENOUS CONTINUOUS
Status: DISCONTINUED | OUTPATIENT
Start: 2023-01-31 | End: 2023-01-31

## 2023-01-31 RX ORDER — LORAZEPAM 2 MG/ML
INJECTION INTRAMUSCULAR
Status: DISPENSED
Start: 2023-01-31 | End: 2023-02-01

## 2023-01-31 RX ORDER — NICARDIPINE HYDROCHLORIDE 0.2 MG/ML
0-15 INJECTION INTRAVENOUS CONTINUOUS
Status: DISCONTINUED | OUTPATIENT
Start: 2023-01-31 | End: 2023-02-01

## 2023-01-31 RX ORDER — METHYLPREDNISOLONE SOD SUCC 125 MG
125 VIAL (EA) INJECTION ONCE
Status: COMPLETED | OUTPATIENT
Start: 2023-01-31 | End: 2023-01-31

## 2023-01-31 RX ORDER — LORAZEPAM 2 MG/ML
2 INJECTION INTRAMUSCULAR
Status: DISCONTINUED | OUTPATIENT
Start: 2023-01-31 | End: 2023-01-31

## 2023-01-31 RX ORDER — POTASSIUM CHLORIDE 20 MEQ/1
20 TABLET, EXTENDED RELEASE ORAL ONCE
Status: COMPLETED | OUTPATIENT
Start: 2023-01-31 | End: 2023-01-31

## 2023-01-31 RX ORDER — FENTANYL CITRATE-0.9 % NACL/PF 10 MCG/ML
0-200 PLASTIC BAG, INJECTION (ML) INTRAVENOUS CONTINUOUS
Status: DISCONTINUED | OUTPATIENT
Start: 2023-01-31 | End: 2023-01-31

## 2023-01-31 RX ORDER — ATORVASTATIN CALCIUM 40 MG/1
40 TABLET, FILM COATED ORAL DAILY
Status: DISCONTINUED | OUTPATIENT
Start: 2023-02-01 | End: 2023-01-31

## 2023-01-31 RX ORDER — IPRATROPIUM BROMIDE AND ALBUTEROL SULFATE 2.5; .5 MG/3ML; MG/3ML
3 SOLUTION RESPIRATORY (INHALATION) EVERY 6 HOURS
Status: DISCONTINUED | OUTPATIENT
Start: 2023-02-01 | End: 2023-02-06 | Stop reason: HOSPADM

## 2023-01-31 RX ORDER — NAPROXEN SODIUM 220 MG/1
81 TABLET, FILM COATED ORAL DAILY
Status: DISCONTINUED | OUTPATIENT
Start: 2023-02-01 | End: 2023-02-01

## 2023-01-31 RX ORDER — FAMOTIDINE 20 MG/1
20 TABLET, FILM COATED ORAL 2 TIMES DAILY
Status: DISCONTINUED | OUTPATIENT
Start: 2023-02-01 | End: 2023-01-31

## 2023-01-31 RX ORDER — IPRATROPIUM BROMIDE AND ALBUTEROL SULFATE 2.5; .5 MG/3ML; MG/3ML
3 SOLUTION RESPIRATORY (INHALATION) ONCE
Status: COMPLETED | OUTPATIENT
Start: 2023-01-31 | End: 2023-01-31

## 2023-01-31 RX ORDER — FAMOTIDINE 10 MG/ML
20 INJECTION INTRAVENOUS
Status: COMPLETED | OUTPATIENT
Start: 2023-01-31 | End: 2023-01-31

## 2023-01-31 RX ORDER — IPRATROPIUM BROMIDE AND ALBUTEROL SULFATE 2.5; .5 MG/3ML; MG/3ML
3 SOLUTION RESPIRATORY (INHALATION) EVERY 6 HOURS PRN
Status: DISCONTINUED | OUTPATIENT
Start: 2023-01-31 | End: 2023-01-31

## 2023-01-31 RX ORDER — FAMOTIDINE 20 MG/1
20 TABLET, FILM COATED ORAL DAILY
Status: DISCONTINUED | OUTPATIENT
Start: 2023-02-01 | End: 2023-02-01

## 2023-01-31 RX ORDER — NAPROXEN SODIUM 220 MG/1
81 TABLET, FILM COATED ORAL DAILY
Status: DISCONTINUED | OUTPATIENT
Start: 2023-02-01 | End: 2023-01-31

## 2023-01-31 RX ORDER — HEPARIN SODIUM 5000 [USP'U]/ML
5000 INJECTION, SOLUTION INTRAVENOUS; SUBCUTANEOUS EVERY 8 HOURS
Status: DISCONTINUED | OUTPATIENT
Start: 2023-02-01 | End: 2023-02-06 | Stop reason: HOSPADM

## 2023-01-31 RX ADMIN — POTASSIUM CHLORIDE 20 MEQ: 1500 TABLET, EXTENDED RELEASE ORAL at 03:01

## 2023-01-31 RX ADMIN — ASPIRIN 81 MG: 81 TABLET, COATED ORAL at 01:01

## 2023-01-31 RX ADMIN — ETOMIDATE 20 MG: 2 INJECTION INTRAVENOUS at 07:01

## 2023-01-31 RX ADMIN — METHYLPREDNISOLONE SODIUM SUCCINATE 125 MG: 125 INJECTION, POWDER, FOR SOLUTION INTRAMUSCULAR; INTRAVENOUS at 07:01

## 2023-01-31 RX ADMIN — IPRATROPIUM BROMIDE AND ALBUTEROL SULFATE 3 ML: .5; 3 SOLUTION RESPIRATORY (INHALATION) at 08:01

## 2023-01-31 RX ADMIN — IOHEXOL 75 ML: 350 INJECTION, SOLUTION INTRAVENOUS at 07:01

## 2023-01-31 RX ADMIN — LEVETIRACETAM 3000 MG: 100 INJECTION, SOLUTION INTRAVENOUS at 08:01

## 2023-01-31 RX ADMIN — FENTANYL CITRATE 50 MCG: 50 INJECTION INTRAMUSCULAR; INTRAVENOUS at 08:01

## 2023-01-31 RX ADMIN — ATORVASTATIN CALCIUM 40 MG: 10 TABLET, FILM COATED ORAL at 01:01

## 2023-01-31 RX ADMIN — SODIUM CHLORIDE 250 ML: 9 INJECTION, SOLUTION INTRAVENOUS at 11:01

## 2023-01-31 RX ADMIN — PROPOFOL 35 MCG/KG/MIN: 10 INJECTION, EMULSION INTRAVENOUS at 11:01

## 2023-01-31 RX ADMIN — FAMOTIDINE 20 MG: 10 INJECTION INTRAVENOUS at 08:01

## 2023-01-31 RX ADMIN — Medication 25 MCG/HR: at 08:01

## 2023-01-31 RX ADMIN — LORAZEPAM 2 MG: 2 INJECTION INTRAMUSCULAR; INTRAVENOUS at 07:01

## 2023-01-31 RX ADMIN — SODIUM CHLORIDE, POTASSIUM CHLORIDE, SODIUM LACTATE AND CALCIUM CHLORIDE: 600; 310; 30; 20 INJECTION, SOLUTION INTRAVENOUS at 01:01

## 2023-01-31 RX ADMIN — SUCCINYLCHOLINE CHLORIDE 70 MG: 20 INJECTION, SOLUTION INTRAMUSCULAR; INTRAVENOUS at 07:01

## 2023-01-31 RX ADMIN — PROPOFOL 5 MCG/KG/MIN: 10 INJECTION, EMULSION INTRAVENOUS at 08:01

## 2023-01-31 NOTE — ED TRIAGE NOTES
"Pt states his left hand & fingers "does not listen to him and goes limp often." Unable to control hand movements. States no pain but has some numbness and tingling. Started 2 months ago. Pt daughter at bedside stated correction home informed her pt had been confused for past week. Pt AAO x 3.   "

## 2023-01-31 NOTE — ED PROVIDER NOTES
Encounter Date: 1/31/2023       History     Chief Complaint   Patient presents with    Numbness     Pt c/o numbness and tingling to left hand (left 4th and 5th fingers ) x 2 months. Per friend pt has been stumbling x 2 months. Pt also with AMS x 1 week. AAOx4 in triage. No slurred speech or facial drooping noted. Pt ambulatory with steady gait.      77-year-old male presenting with left hand numbness and weakness for 2 months.  Seen here and given gabapentin.  Reports symptoms not improved.  Also noted last few days of confusion, dizziness, staring spells.  These symptoms started within 1 2 days ago.  Denies fevers, chills, headache, nausea, vomiting.  Denies new numbness or weakness.  Previously in usual state of health.    Review of patient's allergies indicates:   Allergen Reactions    Penicillins Hives     Past Medical History:   Diagnosis Date    Prostate atrophy      History reviewed. No pertinent surgical history.  History reviewed. No pertinent family history.  Social History     Tobacco Use    Smoking status: Some Days     Packs/day: 1.00     Types: Cigarettes   Substance Use Topics    Alcohol use: Yes     Alcohol/week: 1.0 standard drink     Types: 1 Cans of beer per week     Comment: 1/31/23: Denies    Drug use: No     Review of Systems   Constitutional:  Negative for chills and fever.   HENT:  Negative for sore throat.    Respiratory:  Negative for shortness of breath.    Cardiovascular:  Negative for chest pain.   Gastrointestinal:  Negative for nausea.   Genitourinary:  Negative for dysuria.   Musculoskeletal:  Negative for back pain.   Skin:  Negative for rash.   Neurological:  Positive for weakness. Negative for syncope, facial asymmetry and speech difficulty.   Psychiatric/Behavioral:  Positive for confusion.      Physical Exam     Initial Vitals [01/31/23 0752]   BP Pulse Resp Temp SpO2   (!) 140/69 75 18 97.8 °F (36.6 °C) 100 %      MAP       --         Physical Exam    Constitutional: He appears  well-developed and well-nourished. He is not diaphoretic. No distress.   HENT:   Head: Normocephalic and atraumatic.   Eyes: Conjunctivae and EOM are normal. Pupils are equal, round, and reactive to light. No scleral icterus.   Neck: Neck supple.   Normal range of motion.  Cardiovascular:  Normal rate, regular rhythm, normal heart sounds and intact distal pulses.     Exam reveals no gallop and no friction rub.       No murmur heard.  Pulmonary/Chest: Breath sounds normal. No stridor. No respiratory distress. He has no wheezes. He has no rhonchi. He has no rales.   Abdominal: Abdomen is soft. Bowel sounds are normal. He exhibits no distension. There is no abdominal tenderness. There is no rebound and no guarding.   Musculoskeletal:         General: No tenderness or edema. Normal range of motion.      Cervical back: Normal range of motion and neck supple.     Neurological: He is alert and oriented to person, place, and time. No cranial nerve deficit. GCS score is 15. GCS eye subscore is 4. GCS verbal subscore is 5. GCS motor subscore is 6.   Nonspecific weakness and discoordination of left hand   Skin: Skin is warm and dry. No rash noted.   Psychiatric: He has a normal mood and affect. His behavior is normal.       ED Course   Procedures  Labs Reviewed   CBC W/ AUTO DIFFERENTIAL - Abnormal; Notable for the following components:       Result Value    RDW 15.7 (*)     All other components within normal limits   COMPREHENSIVE METABOLIC PANEL - Abnormal; Notable for the following components:    Potassium 3.3 (*)     BUN 7 (*)     All other components within normal limits   URINALYSIS, REFLEX TO URINE CULTURE    Narrative:     Specimen Source->Urine   LIPID PANEL   TSH   MAGNESIUM   MAGNESIUM   HEMOGLOBIN A1C     EKG Readings: (Independently Interpreted)   Initial Reading: No STEMI. Rhythm: Normal Sinus Rhythm. Heart Rate: 67. Ectopy: No Ectopy.   No ST segment elevation or depression concerning for acute ischemia.         Imaging Results              US Carotid Bilateral (In process)                      X-Ray Chest 1 View (Final result)  Result time 01/31/23 09:21:07      Final result by Travis Camargo MD (01/31/23 09:21:07)                   Impression:      1. Radiographic findings of COPD the stable chronic findings.  No new focal consolidation.      Electronically signed by: Travis Camargo MD  Date:    01/31/2023  Time:    09:21               Narrative:    EXAMINATION:  XR CHEST 1 VIEW    CLINICAL HISTORY:  Other symptoms and signs involving the musculoskeletal system    TECHNIQUE:  Single frontal view of the chest was performed.    COMPARISON:  Radiograph 10/09/2022.    FINDINGS:  Cardiac monitoring leads project over the bilateral hemithoraces.  Mediastinal structures are midline.  There is atherosclerotic calcification of the aortic arch.  Hilar contours are unremarkable.  Cardiac silhouette is normal in size.  Lungs are overexpanded but symmetric and demonstrate associated flattening of the hemidiaphragms.  Increased interstitial and soft tissue opacities within the bilateral upper and lower lung zones, similar when compared to the previous radiograph.  No new focal consolidation.  No pneumothorax or large pleural effusion.  No free air beneath the diaphragm.  No acute osseous abnormalities.  Visualized soft tissues are within normal limits.                                        CT Head Without Contrast (Final result)  Result time 01/31/23 08:57:43      Final result by Travis Camargo MD (01/31/23 08:57:43)                   Impression:      1. Ill-defined areas of parenchymal hypoattenuation within the right temporoparietal region most concerning for age-indeterminate infarcts.  Recommend further characterization with a dedicated MRI brain if patient compatible.  2. Suspected remote infarcts of the posterior right occipital lobe and bilateral basal ganglia.  3. Chronic microvascular ischemic change.  This  report was flagged in Epic as abnormal.      Electronically signed by: Travis Camargo MD  Date:    01/31/2023  Time:    08:57               Narrative:    EXAMINATION:  CT HEAD WITHOUT CONTRAST    CLINICAL HISTORY:  Neuro deficit, acute, stroke suspected;Mental status change, unknown cause;    TECHNIQUE:  5-mm axial images were obtained through the head without the use of contrast.  Coronal and sagittal reformats were performed.    COMPARISON:  None.    FINDINGS:  Ill-defined areas of parenchymal hypoattenuation within the right temporoparietal region.  Additional focal areas of parenchymal hypoattenuation within right posterior occipital lobe and bilateral basal ganglia, likely small remote infarcts.  There is patchy and confluent periventricular white matter hypoattenuation, nonspecific but most suggestive of chronic microvascular ischemic change.  No intracranial hemorrhage.  No abnormal intra or extra-axial fluid collections.  No hydrocephalus.  No midline shift or mass effect.  No large suprasellar masses.    Paranasal sinuses and mastoid air cells are clear.  No acute osseous abnormalities.  Visualized globes are symmetric.  Subcutaneous soft tissues are within normal limits.                                       Medications   aspirin EC tablet 81 mg (81 mg Oral Given 1/31/23 1309)   atorvastatin tablet 40 mg (40 mg Oral Given 1/31/23 1308)   sodium chloride 0.9% flush 10 mL (has no administration in time range)   sodium chloride 0.9% bolus 500 mL 500 mL (has no administration in time range)   acetaminophen tablet 500 mg (has no administration in time range)   senna-docusate 8.6-50 mg per tablet 1 tablet (has no administration in time range)   melatonin tablet 6 mg (has no administration in time range)   albuterol-ipratropium 2.5 mg-0.5 mg/3 mL nebulizer solution 3 mL (has no administration in time range)   potassium chloride SA CR tablet 20 mEq (has no administration in time range)   tamsulosin 24 hr capsule  0.4 mg (has no administration in time range)     Medical Decision Making:   Initial Assessment:   77-year-old male presenting with left hand weakness x2 months.  Nonspecific weakness and discoordination when attempting to use left hand.  Patient notes dropping many things.  Differential includes peripheral cause such as radiculopathy or stenosis, 79 palsy, Pancoast tumor.  CT head shows strokes of various durations.  Could be 1 consistent with symptomatology left hand.  However given altered mental status over last few days with staring spells concern for new more recent strokes.  Patient will be placed observation for further evaluation and treatment of further stroke.                        Clinical Impression:   Final diagnoses:  [R07.9] Chest pain  [R29.898] Left arm weakness  [R29.898] Left arm weakness - Assess for pancoast  [I63.9] CVA (cerebral vascular accident)        ED Disposition Condition    Observation Stable                Sae Alonzo MD  01/31/23 9335

## 2023-01-31 NOTE — H&P
[facility] Emergency [facility] Medicine  History & Physical    Patient Name: Gabriel Springer  MRN: [id number]  Patient Class: OP- Observation  Admission Date: 1/31/2023  Attending Physician: Apolinar Richard MD   Primary Care Provider: Primary Doctor No         Patient information was obtained from patient, past medical records and ER records.     Subjective:     Principal Problem:CVA (cerebral vascular accident)    Chief Complaint:   Chief Complaint   Patient presents with    Numbness     Pt c/o numbness and tingling to left hand (left 4th and 5th fingers ) x 2 months. Per friend pt has been stumbling x 2 months. Pt also with AMS x 1 week. AAOx4 in triage. No slurred speech or facial drooping noted. Pt ambulatory with steady gait.         HPI: Gabriel Springer 77 y.o. male with bph tobacco abuse and asthma presents to the hospital with a chief complaint of left arm weakness.  He reports 2 weeks of left arm weakness and numbness that has been constant without aggravating alleviating factors.  He attempted no treatment at home.  He finds he is unable to pick things up with his left hand.  He denies any slurred speech or difficulty swallowing and no numbness of his legs. He finds he has been having trouble walking at home with increasing stumbling.  He denies fever chest pain shortness of breath nausea vomiting abdominal pain leg swelling syncope dizziness dysuria melena hematuria hematemesis.    In the ED, chest x-ray with radiographic findings COPD with stable chronic findings no focal consolidation CT head with areas of parenchymal hypoattenuation within the right temporoparietal region most concerning for age-indeterminate infarcts afebrile without leukocytosis potassium of 3.3  UA without leukocytes or nitrites.      Past Medical History:   Diagnosis Date    Prostate atrophy        History reviewed. No pertinent surgical history.    Review of patient's allergies indicates:   Allergen Reactions    Penicillins  Hives       No current facility-administered medications on file prior to encounter.     Current Outpatient Medications on File Prior to Encounter   Medication Sig    albuterol (PROVENTIL/VENTOLIN HFA) 90 mcg/actuation inhaler Inhale 2 puffs into the lungs every 6 (six) hours as needed (cough and shortness of breath). Rescue    tamsulosin (FLOMAX) 0.4 mg Cap Take 0.4 mg by mouth once daily.    UNABLE TO FIND Take 2.5 mg by mouth nightly. medication name:  sleep aid (unknown name)    albuterol sulfate 2.5 mg/0.5 mL Nebu Take 2.5 mg by nebulization every 4 (four) hours as needed. Rescue    [DISCONTINUED] desonide (DESOWEN) 0.05 % cream Apply topically 2 (two) times daily.    [DISCONTINUED] gabapentin (NEURONTIN) 300 MG capsule Take 1 capsule (300 mg total) by mouth 3 (three) times daily.     Family History    Dad-Asthma       Tobacco Use    Smoking status: Some Days     Packs/day: 1.00     Types: Cigarettes    Smokeless tobacco: Not on file   Substance and Sexual Activity    Alcohol use: Yes     Alcohol/week: 1.0 standard drink     Types: 1 Cans of beer per week     Comment: 1/31/23: Denies    Drug use: No    Sexual activity: Not Currently     Review of Systems   Constitutional:  Negative for chills and fever.   HENT:  Negative for nosebleeds and tinnitus.    Eyes:  Negative for photophobia and visual disturbance.   Respiratory:  Negative for shortness of breath and wheezing.    Cardiovascular:  Negative for chest pain, palpitations and leg swelling.   Gastrointestinal:  Negative for abdominal distention, nausea and vomiting.   Genitourinary:  Negative for dysuria, flank pain and hematuria.   Musculoskeletal:  Negative for gait problem and joint swelling.   Skin:  Negative for rash and wound.   Neurological:  Positive for weakness and numbness. Negative for seizures and syncope.   Objective:     Vital Signs (Most Recent):  Temp: 97.8 °F (36.6 °C) (01/31/23 0752)  Pulse: 70 (01/31/23 0932)  Resp: (!) 41 (01/31/23  0932)  BP: (!) 168/74 (01/31/23 0932)  SpO2: 99 % (01/31/23 0933)   Vital Signs (24h Range):  Temp:  [97.8 °F (36.6 °C)] 97.8 °F (36.6 °C)  Pulse:  [70-75] 70  Resp:  [18-41] 41  SpO2:  [99 %-100 %] 99 %  BP: (140-168)/(69-74) 168/74     Weight: 56.2 kg (124 lb)  Body mass index is 16.36 kg/m².    Physical Exam  Vitals and nursing note reviewed.   Constitutional:       General: He is not in acute distress.     Appearance: He is well-developed. He is not diaphoretic.   HENT:      Head: Normocephalic and atraumatic.      Right Ear: External ear normal.      Left Ear: External ear normal.   Eyes:      General:         Right eye: No discharge.         Left eye: No discharge.      Conjunctiva/sclera: Conjunctivae normal.   Neck:      Thyroid: No thyromegaly.   Cardiovascular:      Rate and Rhythm: Normal rate and regular rhythm.      Heart sounds: No murmur heard.  Pulmonary:      Effort: Pulmonary effort is normal. No respiratory distress.      Breath sounds: Normal breath sounds.   Abdominal:      General: Bowel sounds are normal. There is no distension.      Palpations: Abdomen is soft. There is no mass.      Tenderness: There is no abdominal tenderness.   Musculoskeletal:         General: No deformity.      Cervical back: Normal range of motion and neck supple.   Skin:     General: Skin is warm and dry.   Neurological:      Mental Status: He is alert and oriented to person, place, and time.      Sensory: Sensory deficit present.      Comments: 4/5 left sided  strength. 5/5 right  strength. 5/5 BLE. Reports numbness to left hand. No slurred speech no facial droop   Psychiatric:         Mood and Affect: Mood normal.         Behavior: Behavior normal.           Significant Labs: CBC:   Recent Labs   Lab 01/31/23  0845   WBC 4.30   HGB 18.0   HCT 54.0        CMP:   Recent Labs   Lab 01/31/23  0845      K 3.3*      CO2 28   GLU 80   BUN 7*   CREATININE 1.0   CALCIUM 9.3   PROT 7.9   ALBUMIN 4.0    BILITOT 0.8   ALKPHOS 119   AST 21   ALT 11   ANIONGAP 9     Lipid Panel:   Recent Labs   Lab 01/31/23  0936   CHOL 176   HDL 55   LDLCALC 108.2   TRIG 64   CHOLHDL 31.3     Urine Studies:   Recent Labs   Lab 01/31/23  0940   COLORU Yellow   APPEARANCEUA Clear   PHUR 6.0   SPECGRAV 1.010   PROTEINUA Negative   GLUCUA Negative   KETONESU Negative   BILIRUBINUA Negative   OCCULTUA Negative   NITRITE Negative   UROBILINOGEN Negative   LEUKOCYTESUR Negative       Significant Imaging:   Imaging Results              X-Ray Chest 1 View (Final result)  Result time 01/31/23 09:21:07      Final result by Travis Camargo MD (01/31/23 09:21:07)                   Impression:      1. Radiographic findings of COPD the stable chronic findings.  No new focal consolidation.      Electronically signed by: Travis Camargo MD  Date:    01/31/2023  Time:    09:21               Narrative:    EXAMINATION:  XR CHEST 1 VIEW    CLINICAL HISTORY:  Other symptoms and signs involving the musculoskeletal system    TECHNIQUE:  Single frontal view of the chest was performed.    COMPARISON:  Radiograph 10/09/2022.    FINDINGS:  Cardiac monitoring leads project over the bilateral hemithoraces.  Mediastinal structures are midline.  There is atherosclerotic calcification of the aortic arch.  Hilar contours are unremarkable.  Cardiac silhouette is normal in size.  Lungs are overexpanded but symmetric and demonstrate associated flattening of the hemidiaphragms.  Increased interstitial and soft tissue opacities within the bilateral upper and lower lung zones, similar when compared to the previous radiograph.  No new focal consolidation.  No pneumothorax or large pleural effusion.  No free air beneath the diaphragm.  No acute osseous abnormalities.  Visualized soft tissues are within normal limits.                                        CT Head Without Contrast (Final result)  Result time 01/31/23 08:57:43      Final result by Travis Camargo MD  (01/31/23 08:57:43)                   Impression:      1. Ill-defined areas of parenchymal hypoattenuation within the right temporoparietal region most concerning for age-indeterminate infarcts.  Recommend further characterization with a dedicated MRI brain if patient compatible.  2. Suspected remote infarcts of the posterior right occipital lobe and bilateral basal ganglia.  3. Chronic microvascular ischemic change.  This report was flagged in Epic as abnormal.      Electronically signed by: Travis Camargo MD  Date:    01/31/2023  Time:    08:57               Narrative:    EXAMINATION:  CT HEAD WITHOUT CONTRAST    CLINICAL HISTORY:  Neuro deficit, acute, stroke suspected;Mental status change, unknown cause;    TECHNIQUE:  5-mm axial images were obtained through the head without the use of contrast.  Coronal and sagittal reformats were performed.    COMPARISON:  None.    FINDINGS:  Ill-defined areas of parenchymal hypoattenuation within the right temporoparietal region.  Additional focal areas of parenchymal hypoattenuation within right posterior occipital lobe and bilateral basal ganglia, likely small remote infarcts.  There is patchy and confluent periventricular white matter hypoattenuation, nonspecific but most suggestive of chronic microvascular ischemic change.  No intracranial hemorrhage.  No abnormal intra or extra-axial fluid collections.  No hydrocephalus.  No midline shift or mass effect.  No large suprasellar masses.    Paranasal sinuses and mastoid air cells are clear.  No acute osseous abnormalities.  Visualized globes are symmetric.  Subcutaneous soft tissues are within normal limits.                                        Assessment/Plan:     * Acute CVA  He reports 2 weeks of left arm weakness and numbness worse in the hand. CT head with right sided hypoattenuation with the right tempoparietal region for age indeterminate infarcts.  Started on Aspirin/statin for concern for CVA  Echo/MRI/carotid US  pending  Neurology consulted  PT/OT/SLP  Neuro checks/fall precautions/aspiration precautions/telemetry  TSH/A1c/Lipid pending      ADDENDUM: MRI returned positive for CVA in MCA territory. Carotid US with possible stenosis. Started on Plavix and will obtain CTA head and neck, renal function within normal limits and denies allergies.    Prostate atrophy  Continue home flomax    Asthma  He reports no SOB and no wheezing, and being diagnosed with asthma as a child. Though per care everywhere he has a history of COPD. Outpatient pulmonary notes reviewed. Chest x-ray with findings consistent with COPD and he reports ongoing tobacco use.   Continue home PRN duo-nebs    Tobacco abuse  Smokes 1/2ppd. Greater than 3 minutes spent counseling patient on dangers of continued tobacco abuse. Will provide tobacco cessation education prior to discharge    VTE Risk Mitigation (From admission, onward)           Ordered     IP VTE HIGH RISK PATIENT  Once         01/31/23 1324     Place sequential compression device  Until discontinued         01/31/23 1324     Place MIS hose  Until discontinued         01/31/23 1324                  Discussed with ED physician.    VTE: MIS/SCD  Code: Full  Diet: cardiac  Dispo: pending MRI and neuro eval  As clarification, on 1/31/2023, patient should be admitted for hospital observation services under my care in collaboration with Apolinar Richard MD. Wally oMss PA-C  Department of Hospital Medicine   [facility] - [place] - Emergency Dept

## 2023-01-31 NOTE — PHARMACY MED REC
"Admission Medication History     The home medication history was taken by Yasmin Diaz CPhT.      You may go to "Admission" then "Reconcile Home Medications" tabs to review and/or act upon these items.     The home medication list has been updated by the Pharmacy department.   Please read ALL comments highlighted in yellow.   Please address this information as you see fit.    Feel free to contact us if you have any questions or require assistance.      The medications listed below were removed from the home medication list. Please reorder if appropriate:  Patient reports no longer taking the following medication(s):  Neurontin 300 mg tab     Medications listed below were obtained from: Patient/family and Analytic software- [employer]  (Not in a hospital admission)          Patient states he never received nebulizer machine to start treatments.          Yasmin Diaz CPhT.  [phone]        .        "

## 2023-01-31 NOTE — ASSESSMENT & PLAN NOTE
He reports 2 weeks of left arm weakness and numbness worse in the hand. CT head with right sided hypoattenuation with the right tempoparietal region for age indeterminate infarcts.  Started on Aspirin/statin for concern for CVA  Echo/MRI/carotid US pending  Neurology consulted  PT/OT/SLP  Neuro checks/fall precautions/aspiration precautions/telemetry  TSH/A1c/Lipid pending

## 2023-01-31 NOTE — HPI
Gabriel Springer 77 y.o. male with bph tobacco abuse and asthma presents to the hospital with a chief complaint of left arm weakness.  He reports 2 weeks of left arm weakness and numbness that has been constant without aggravating alleviating factors.  He attempted no treatment at home.  He finds he is unable to pick things up with his left hand.  He denies any slurred speech or difficulty swallowing and no numbness of his legs. He finds he has been having trouble walking at home with increasing stumbling.  He denies fever chest pain shortness of breath nausea vomiting abdominal pain leg swelling syncope dizziness dysuria melena hematuria hematemesis.    In the ED, chest x-ray with radiographic findings COPD with stable chronic findings no focal consolidation CT head with areas of parenchymal hypoattenuation within the right temporoparietal region most concerning for age-indeterminate infarcts afebrile without leukocytosis potassium of 3.3  UA without leukocytes or nitrites.

## 2023-01-31 NOTE — SUBJECTIVE & OBJECTIVE
Past Medical History:   Diagnosis Date    Prostate atrophy        History reviewed. No pertinent surgical history.    Review of patient's allergies indicates:   Allergen Reactions    Penicillins Hives       No current facility-administered medications on file prior to encounter.     Current Outpatient Medications on File Prior to Encounter   Medication Sig    albuterol (PROVENTIL/VENTOLIN HFA) 90 mcg/actuation inhaler Inhale 2 puffs into the lungs every 6 (six) hours as needed (cough and shortness of breath). Rescue    tamsulosin (FLOMAX) 0.4 mg Cap Take 0.4 mg by mouth once daily.    UNABLE TO FIND Take 2.5 mg by mouth nightly. medication name:  sleep aid (unknown name)    albuterol sulfate 2.5 mg/0.5 mL Nebu Take 2.5 mg by nebulization every 4 (four) hours as needed. Rescue    [DISCONTINUED] desonide (DESOWEN) 0.05 % cream Apply topically 2 (two) times daily.    [DISCONTINUED] gabapentin (NEURONTIN) 300 MG capsule Take 1 capsule (300 mg total) by mouth 3 (three) times daily.     Family History    Dad-Asthma       Tobacco Use    Smoking status: Some Days     Packs/day: 1.00     Types: Cigarettes    Smokeless tobacco: Not on file   Substance and Sexual Activity    Alcohol use: Yes     Alcohol/week: 1.0 standard drink     Types: 1 Cans of beer per week     Comment: 1/31/23: Denies    Drug use: No    Sexual activity: Not Currently     Review of Systems   Constitutional:  Negative for chills and fever.   HENT:  Negative for nosebleeds and tinnitus.    Eyes:  Negative for photophobia and visual disturbance.   Respiratory:  Negative for shortness of breath and wheezing.    Cardiovascular:  Negative for chest pain, palpitations and leg swelling.   Gastrointestinal:  Negative for abdominal distention, nausea and vomiting.   Genitourinary:  Negative for dysuria, flank pain and hematuria.   Musculoskeletal:  Negative for gait problem and joint swelling.   Skin:  Negative for rash and wound.   Neurological:  Positive for  weakness and numbness. Negative for seizures and syncope.   Objective:     Vital Signs (Most Recent):  Temp: 97.8 °F (36.6 °C) (01/31/23 0752)  Pulse: 70 (01/31/23 0932)  Resp: (!) 41 (01/31/23 0932)  BP: (!) 168/74 (01/31/23 0932)  SpO2: 99 % (01/31/23 0933)   Vital Signs (24h Range):  Temp:  [97.8 °F (36.6 °C)] 97.8 °F (36.6 °C)  Pulse:  [70-75] 70  Resp:  [18-41] 41  SpO2:  [99 %-100 %] 99 %  BP: (140-168)/(69-74) 168/74     Weight: 56.2 kg (124 lb)  Body mass index is 16.36 kg/m².    Physical Exam  Vitals and nursing note reviewed.   Constitutional:       General: He is not in acute distress.     Appearance: He is well-developed. He is not diaphoretic.   HENT:      Head: Normocephalic and atraumatic.      Right Ear: External ear normal.      Left Ear: External ear normal.   Eyes:      General:         Right eye: No discharge.         Left eye: No discharge.      Conjunctiva/sclera: Conjunctivae normal.   Neck:      Thyroid: No thyromegaly.   Cardiovascular:      Rate and Rhythm: Normal rate and regular rhythm.      Heart sounds: No murmur heard.  Pulmonary:      Effort: Pulmonary effort is normal. No respiratory distress.      Breath sounds: Normal breath sounds.   Abdominal:      General: Bowel sounds are normal. There is no distension.      Palpations: Abdomen is soft. There is no mass.      Tenderness: There is no abdominal tenderness.   Musculoskeletal:         General: No deformity.      Cervical back: Normal range of motion and neck supple.   Skin:     General: Skin is warm and dry.   Neurological:      Mental Status: He is alert and oriented to person, place, and time.      Sensory: Sensory deficit present.      Comments: 4/5 left sided  strength. 5/5 right  strength. 5/5 BLE. Reports numbness to left hand. No slurred speech no facial droop   Psychiatric:         Mood and Affect: Mood normal.         Behavior: Behavior normal.           Significant Labs: CBC:   Recent Labs   Lab 01/31/23  0845    WBC 4.30   HGB 18.0   HCT 54.0        CMP:   Recent Labs   Lab 01/31/23  0845      K 3.3*      CO2 28   GLU 80   BUN 7*   CREATININE 1.0   CALCIUM 9.3   PROT 7.9   ALBUMIN 4.0   BILITOT 0.8   ALKPHOS 119   AST 21   ALT 11   ANIONGAP 9     Lipid Panel:   Recent Labs   Lab 01/31/23  0936   CHOL 176   HDL 55   LDLCALC 108.2   TRIG 64   CHOLHDL 31.3     Urine Studies:   Recent Labs   Lab 01/31/23  0940   COLORU Yellow   APPEARANCEUA Clear   PHUR 6.0   SPECGRAV 1.010   PROTEINUA Negative   GLUCUA Negative   KETONESU Negative   BILIRUBINUA Negative   OCCULTUA Negative   NITRITE Negative   UROBILINOGEN Negative   LEUKOCYTESUR Negative       Significant Imaging:   Imaging Results              X-Ray Chest 1 View (Final result)  Result time 01/31/23 09:21:07      Final result by Travis Caamrgo MD (01/31/23 09:21:07)                   Impression:      1. Radiographic findings of COPD the stable chronic findings.  No new focal consolidation.      Electronically signed by: Travis Camargo MD  Date:    01/31/2023  Time:    09:21               Narrative:    EXAMINATION:  XR CHEST 1 VIEW    CLINICAL HISTORY:  Other symptoms and signs involving the musculoskeletal system    TECHNIQUE:  Single frontal view of the chest was performed.    COMPARISON:  Radiograph 10/09/2022.    FINDINGS:  Cardiac monitoring leads project over the bilateral hemithoraces.  Mediastinal structures are midline.  There is atherosclerotic calcification of the aortic arch.  Hilar contours are unremarkable.  Cardiac silhouette is normal in size.  Lungs are overexpanded but symmetric and demonstrate associated flattening of the hemidiaphragms.  Increased interstitial and soft tissue opacities within the bilateral upper and lower lung zones, similar when compared to the previous radiograph.  No new focal consolidation.  No pneumothorax or large pleural effusion.  No free air beneath the diaphragm.  No acute osseous abnormalities.   Visualized soft tissues are within normal limits.                                        CT Head Without Contrast (Final result)  Result time 01/31/23 08:57:43      Final result by Travis Camargo MD (01/31/23 08:57:43)                   Impression:      1. Ill-defined areas of parenchymal hypoattenuation within the right temporoparietal region most concerning for age-indeterminate infarcts.  Recommend further characterization with a dedicated MRI brain if patient compatible.  2. Suspected remote infarcts of the posterior right occipital lobe and bilateral basal ganglia.  3. Chronic microvascular ischemic change.  This report was flagged in Epic as abnormal.      Electronically signed by: Travis Camargo MD  Date:    01/31/2023  Time:    08:57               Narrative:    EXAMINATION:  CT HEAD WITHOUT CONTRAST    CLINICAL HISTORY:  Neuro deficit, acute, stroke suspected;Mental status change, unknown cause;    TECHNIQUE:  5-mm axial images were obtained through the head without the use of contrast.  Coronal and sagittal reformats were performed.    COMPARISON:  None.    FINDINGS:  Ill-defined areas of parenchymal hypoattenuation within the right temporoparietal region.  Additional focal areas of parenchymal hypoattenuation within right posterior occipital lobe and bilateral basal ganglia, likely small remote infarcts.  There is patchy and confluent periventricular white matter hypoattenuation, nonspecific but most suggestive of chronic microvascular ischemic change.  No intracranial hemorrhage.  No abnormal intra or extra-axial fluid collections.  No hydrocephalus.  No midline shift or mass effect.  No large suprasellar masses.    Paranasal sinuses and mastoid air cells are clear.  No acute osseous abnormalities.  Visualized globes are symmetric.  Subcutaneous soft tissues are within normal limits.

## 2023-01-31 NOTE — ASSESSMENT & PLAN NOTE
He reports no SOB and no wheezing, and being diagnosed with asthma as a child. Though per care everywhere he has a history of COPD. Outpatient pulmonary notes reviewed. Chest x-ray with findings consistent with COPD and he reports ongoing tobacco use.   Continue home PRN duo-nebs

## 2023-02-01 PROBLEM — G93.40 ENCEPHALOPATHY: Status: ACTIVE | Noted: 2023-02-01

## 2023-02-01 PROBLEM — R56.9 SEIZURE: Status: ACTIVE | Noted: 2023-02-01

## 2023-02-01 LAB
ABO + RH BLD: NORMAL
ALBUMIN SERPL BCP-MCNC: 3.7 G/DL (ref 3.5–5.2)
ALLENS TEST: ABNORMAL
ALP SERPL-CCNC: 115 U/L (ref 55–135)
ALT SERPL W/O P-5'-P-CCNC: 14 U/L (ref 10–44)
ANION GAP SERPL CALC-SCNC: 12 MMOL/L (ref 8–16)
ASCENDING AORTA: 2.72 CM
AST SERPL-CCNC: 31 U/L (ref 10–40)
AV INDEX (PROSTH): 0.75
AV MEAN GRADIENT: 4 MMHG
AV PEAK GRADIENT: 6 MMHG
AV VALVE AREA: 2.33 CM2
AV VELOCITY RATIO: 0.67
BACTERIA #/AREA URNS AUTO: ABNORMAL /HPF
BASOPHILS # BLD AUTO: 0.02 K/UL (ref 0–0.2)
BASOPHILS NFR BLD: 0.2 % (ref 0–1.9)
BILIRUB SERPL-MCNC: 0.8 MG/DL (ref 0.1–1)
BILIRUB UR QL STRIP: NEGATIVE
BLD GP AB SCN CELLS X3 SERPL QL: NORMAL
BSA FOR ECHO PROCEDURE: 1.7 M2
BUN SERPL-MCNC: 9 MG/DL (ref 8–23)
CALCIUM SERPL-MCNC: 9 MG/DL (ref 8.7–10.5)
CHLORIDE SERPL-SCNC: 105 MMOL/L (ref 95–110)
CLARITY UR REFRACT.AUTO: ABNORMAL
CO2 SERPL-SCNC: 18 MMOL/L (ref 23–29)
COLOR UR AUTO: ABNORMAL
CREAT SERPL-MCNC: 1 MG/DL (ref 0.5–1.4)
CV ECHO LV RWT: 0.49 CM
DELSYS: ABNORMAL
DIFFERENTIAL METHOD: ABNORMAL
DOP CALC AO PEAK VEL: 1.25 M/S
DOP CALC AO VTI: 20.7 CM
DOP CALC LVOT AREA: 3.1 CM2
DOP CALC LVOT DIAMETER: 1.99 CM
DOP CALC LVOT PEAK VEL: 0.84 M/S
DOP CALC LVOT STROKE VOLUME: 48.15 CM3
DOP CALCLVOT PEAK VEL VTI: 15.49 CM
E WAVE DECELERATION TIME: 362.84 MSEC
E/A RATIO: 0.8
E/E' RATIO: 10.36 M/S
ECHO LV POSTERIOR WALL: 0.95 CM (ref 0.6–1.1)
EJECTION FRACTION: 68 %
EOSINOPHIL # BLD AUTO: 0 K/UL (ref 0–0.5)
EOSINOPHIL NFR BLD: 0 % (ref 0–8)
ERYTHROCYTE [DISTWIDTH] IN BLOOD BY AUTOMATED COUNT: 15.5 % (ref 11.5–14.5)
ERYTHROCYTE [SEDIMENTATION RATE] IN BLOOD BY WESTERGREN METHOD: 20 MM/H
EST. GFR  (NO RACE VARIABLE): >60 ML/MIN/1.73 M^2
ESTIMATED AVG GLUCOSE: 91 MG/DL (ref 68–131)
FIO2: 50
FRACTIONAL SHORTENING: 34 % (ref 28–44)
GLUCOSE SERPL-MCNC: 115 MG/DL (ref 70–110)
GLUCOSE UR QL STRIP: NEGATIVE
HBA1C MFR BLD: 4.8 % (ref 4–5.6)
HCO3 UR-SCNC: 24.8 MMOL/L (ref 24–28)
HCT VFR BLD AUTO: 53.4 % (ref 40–54)
HGB BLD-MCNC: 17.4 G/DL (ref 14–18)
HGB UR QL STRIP: ABNORMAL
HYALINE CASTS UR QL AUTO: 0 /LPF
IMM GRANULOCYTES # BLD AUTO: 0.05 K/UL (ref 0–0.04)
IMM GRANULOCYTES NFR BLD AUTO: 0.5 % (ref 0–0.5)
INTERVENTRICULAR SEPTUM: 0.86 CM (ref 0.6–1.1)
KETONES UR QL STRIP: NEGATIVE
LA MAJOR: 3.57 CM
LA MINOR: 3.81 CM
LA WIDTH: 2.58 CM
LEFT ATRIUM SIZE: 3.1 CM
LEFT ATRIUM VOLUME INDEX: 14.3 ML/M2
LEFT ATRIUM VOLUME: 25.06 CM3
LEFT INTERNAL DIMENSION IN SYSTOLE: 2.54 CM (ref 2.1–4)
LEFT VENTRICLE DIASTOLIC VOLUME INDEX: 36.33 ML/M2
LEFT VENTRICLE DIASTOLIC VOLUME: 63.57 ML
LEFT VENTRICLE MASS INDEX: 59 G/M2
LEFT VENTRICLE SYSTOLIC VOLUME INDEX: 13.3 ML/M2
LEFT VENTRICLE SYSTOLIC VOLUME: 23.19 ML
LEFT VENTRICULAR INTERNAL DIMENSION IN DIASTOLE: 3.84 CM (ref 3.5–6)
LEFT VENTRICULAR MASS: 103.55 G
LEUKOCYTE ESTERASE UR QL STRIP: ABNORMAL
LV LATERAL E/E' RATIO: 11.4 M/S
LV SEPTAL E/E' RATIO: 9.5 M/S
LYMPHOCYTES # BLD AUTO: 0.4 K/UL (ref 1–4.8)
LYMPHOCYTES NFR BLD: 3.9 % (ref 18–48)
MAGNESIUM SERPL-MCNC: 2 MG/DL (ref 1.6–2.6)
MCH RBC QN AUTO: 30.2 PG (ref 27–31)
MCHC RBC AUTO-ENTMCNC: 32.6 G/DL (ref 32–36)
MCV RBC AUTO: 93 FL (ref 82–98)
MICROSCOPIC COMMENT: ABNORMAL
MIN VOL: 9.74
MODE: ABNORMAL
MONOCYTES # BLD AUTO: 0.2 K/UL (ref 0.3–1)
MONOCYTES NFR BLD: 1.7 % (ref 4–15)
MV PEAK A VEL: 0.71 M/S
MV PEAK E VEL: 0.57 M/S
MV STENOSIS PRESSURE HALF TIME: 105.22 MS
MV VALVE AREA P 1/2 METHOD: 2.09 CM2
NEUTROPHILS # BLD AUTO: 9.5 K/UL (ref 1.8–7.7)
NEUTROPHILS NFR BLD: 93.7 % (ref 38–73)
NITRITE UR QL STRIP: NEGATIVE
NRBC BLD-RTO: 0 /100 WBC
PCO2 BLDA: 45.1 MMHG (ref 35–45)
PEEP: 5
PH SMN: 7.35 [PH] (ref 7.35–7.45)
PH UR STRIP: 6 [PH] (ref 5–8)
PHOSPHATE SERPL-MCNC: 2.9 MG/DL (ref 2.7–4.5)
PIP: 23
PLATELET # BLD AUTO: 212 K/UL (ref 150–450)
PMV BLD AUTO: 9.8 FL (ref 9.2–12.9)
PO2 BLDA: 174 MMHG (ref 80–100)
POC BE: -1 MMOL/L
POC SATURATED O2: 99 % (ref 95–100)
POC TCO2: 26 MMOL/L (ref 23–27)
POCT GLUCOSE: 117 MG/DL (ref 70–110)
POTASSIUM SERPL-SCNC: 4.8 MMOL/L (ref 3.5–5.1)
PROT SERPL-MCNC: 7.2 G/DL (ref 6–8.4)
PROT UR QL STRIP: ABNORMAL
RA MAJOR: 3.47 CM
RA WIDTH: 2.58 CM
RBC # BLD AUTO: 5.77 M/UL (ref 4.6–6.2)
RBC #/AREA URNS AUTO: >100 /HPF (ref 0–4)
RIGHT VENTRICULAR END-DIASTOLIC DIMENSION: 2.51 CM
SAMPLE: ABNORMAL
SINUS: 2.88 CM
SITE: ABNORMAL
SODIUM SERPL-SCNC: 135 MMOL/L (ref 136–145)
SP GR UR STRIP: >1.03 (ref 1–1.03)
SP02: 100
STJ: 2.85 CM
TDI LATERAL: 0.05 M/S
TDI SEPTAL: 0.06 M/S
TDI: 0.06 M/S
TRICUSPID ANNULAR PLANE SYSTOLIC EXCURSION: 2.08 CM
URN SPEC COLLECT METH UR: ABNORMAL
VT: 470
WBC # BLD AUTO: 10.16 K/UL (ref 3.9–12.7)
WBC #/AREA URNS AUTO: 24 /HPF (ref 0–5)

## 2023-02-01 PROCEDURE — 27000221 HC OXYGEN, UP TO 24 HOURS

## 2023-02-01 PROCEDURE — 95813 EEG EXTND MNTR 61-119 MIN: CPT

## 2023-02-01 PROCEDURE — 80053 COMPREHEN METABOLIC PANEL: CPT | Performed by: NURSE PRACTITIONER

## 2023-02-01 PROCEDURE — 97167 OT EVAL HIGH COMPLEX 60 MIN: CPT

## 2023-02-01 PROCEDURE — 25000003 PHARM REV CODE 250: Performed by: NURSE PRACTITIONER

## 2023-02-01 PROCEDURE — 97112 NEUROMUSCULAR REEDUCATION: CPT

## 2023-02-01 PROCEDURE — 94150 VITAL CAPACITY TEST: CPT

## 2023-02-01 PROCEDURE — 83735 ASSAY OF MAGNESIUM: CPT | Performed by: NURSE PRACTITIONER

## 2023-02-01 PROCEDURE — 36600 WITHDRAWAL OF ARTERIAL BLOOD: CPT

## 2023-02-01 PROCEDURE — 36415 COLL VENOUS BLD VENIPUNCTURE: CPT | Performed by: PSYCHIATRY & NEUROLOGY

## 2023-02-01 PROCEDURE — 81001 URINALYSIS AUTO W/SCOPE: CPT | Performed by: NURSE PRACTITIONER

## 2023-02-01 PROCEDURE — 99900026 HC AIRWAY MAINTENANCE (STAT)

## 2023-02-01 PROCEDURE — 63600175 PHARM REV CODE 636 W HCPCS: Performed by: STUDENT IN AN ORGANIZED HEALTH CARE EDUCATION/TRAINING PROGRAM

## 2023-02-01 PROCEDURE — 25000003 PHARM REV CODE 250

## 2023-02-01 PROCEDURE — 95813 PR EEG, EXTENDED, 61-119 MINS: ICD-10-PCS | Mod: 26,,, | Performed by: PSYCHIATRY & NEUROLOGY

## 2023-02-01 PROCEDURE — 94640 AIRWAY INHALATION TREATMENT: CPT

## 2023-02-01 PROCEDURE — 63600175 PHARM REV CODE 636 W HCPCS: Performed by: NURSE PRACTITIONER

## 2023-02-01 PROCEDURE — 85025 COMPLETE CBC W/AUTO DIFF WBC: CPT | Performed by: NURSE PRACTITIONER

## 2023-02-01 PROCEDURE — 99900035 HC TECH TIME PER 15 MIN (STAT)

## 2023-02-01 PROCEDURE — 25000242 PHARM REV CODE 250 ALT 637 W/ HCPCS: Performed by: NURSE PRACTITIONER

## 2023-02-01 PROCEDURE — 20000000 HC ICU ROOM

## 2023-02-01 PROCEDURE — 99291 PR CRITICAL CARE, E/M 30-74 MINUTES: ICD-10-PCS | Mod: ,,, | Performed by: PSYCHIATRY & NEUROLOGY

## 2023-02-01 PROCEDURE — 82803 BLOOD GASES ANY COMBINATION: CPT

## 2023-02-01 PROCEDURE — 86900 BLOOD TYPING SEROLOGIC ABO: CPT | Performed by: NURSE PRACTITIONER

## 2023-02-01 PROCEDURE — 87086 URINE CULTURE/COLONY COUNT: CPT | Performed by: NURSE PRACTITIONER

## 2023-02-01 PROCEDURE — 95813 EEG EXTND MNTR 61-119 MIN: CPT | Mod: 26,,, | Performed by: PSYCHIATRY & NEUROLOGY

## 2023-02-01 PROCEDURE — 99291 CRITICAL CARE FIRST HOUR: CPT | Mod: ,,, | Performed by: PSYCHIATRY & NEUROLOGY

## 2023-02-01 PROCEDURE — 84100 ASSAY OF PHOSPHORUS: CPT | Performed by: NURSE PRACTITIONER

## 2023-02-01 PROCEDURE — 99900017 HC EXTUBATION W/PARAMETERS (STAT)

## 2023-02-01 PROCEDURE — 51701 INSERT BLADDER CATHETER: CPT

## 2023-02-01 PROCEDURE — 94761 N-INVAS EAR/PLS OXIMETRY MLT: CPT

## 2023-02-01 PROCEDURE — 51798 US URINE CAPACITY MEASURE: CPT

## 2023-02-01 PROCEDURE — 94010 BREATHING CAPACITY TEST: CPT

## 2023-02-01 RX ORDER — AMOXICILLIN 250 MG
1 CAPSULE ORAL 2 TIMES DAILY
Status: DISCONTINUED | OUTPATIENT
Start: 2023-02-01 | End: 2023-02-06 | Stop reason: HOSPADM

## 2023-02-01 RX ORDER — DEXMEDETOMIDINE HYDROCHLORIDE 4 UG/ML
0-1.4 INJECTION, SOLUTION INTRAVENOUS CONTINUOUS
Status: DISCONTINUED | OUTPATIENT
Start: 2023-02-01 | End: 2023-02-01

## 2023-02-01 RX ORDER — SILODOSIN 4 MG/1
4 CAPSULE ORAL DAILY
Status: DISCONTINUED | OUTPATIENT
Start: 2023-02-01 | End: 2023-02-01

## 2023-02-01 RX ORDER — LEVETIRACETAM 500 MG/5ML
500 INJECTION, SOLUTION, CONCENTRATE INTRAVENOUS EVERY 12 HOURS
Status: DISCONTINUED | OUTPATIENT
Start: 2023-02-01 | End: 2023-02-03

## 2023-02-01 RX ORDER — DEXMEDETOMIDINE HYDROCHLORIDE 4 UG/ML
0-1.4 INJECTION, SOLUTION INTRAVENOUS CONTINUOUS
Status: DISCONTINUED | OUTPATIENT
Start: 2023-02-01 | End: 2023-02-02

## 2023-02-01 RX ORDER — SILODOSIN 4 MG/1
4 CAPSULE ORAL DAILY
Status: DISCONTINUED | OUTPATIENT
Start: 2023-02-02 | End: 2023-02-03

## 2023-02-01 RX ORDER — DEXMEDETOMIDINE HYDROCHLORIDE 4 UG/ML
INJECTION, SOLUTION INTRAVENOUS
Status: COMPLETED
Start: 2023-02-01 | End: 2023-02-01

## 2023-02-01 RX ORDER — LEVETIRACETAM 500 MG/1
500 TABLET ORAL 2 TIMES DAILY
Status: DISCONTINUED | OUTPATIENT
Start: 2023-02-01 | End: 2023-02-01

## 2023-02-01 RX ORDER — NAPROXEN SODIUM 220 MG/1
81 TABLET, FILM COATED ORAL DAILY
Status: DISCONTINUED | OUTPATIENT
Start: 2023-02-02 | End: 2023-02-06 | Stop reason: HOSPADM

## 2023-02-01 RX ORDER — MONTELUKAST SODIUM 10 MG/1
10 TABLET ORAL DAILY
COMMUNITY
Start: 2022-10-06

## 2023-02-01 RX ORDER — ATORVASTATIN CALCIUM 40 MG/1
40 TABLET, FILM COATED ORAL DAILY
Status: DISCONTINUED | OUTPATIENT
Start: 2023-02-02 | End: 2023-02-06 | Stop reason: HOSPADM

## 2023-02-01 RX ORDER — FENTANYL CITRATE 50 UG/ML
50 INJECTION, SOLUTION INTRAMUSCULAR; INTRAVENOUS
Status: DISCONTINUED | OUTPATIENT
Start: 2023-02-01 | End: 2023-02-01

## 2023-02-01 RX ORDER — BUDESONIDE AND FORMOTEROL FUMARATE DIHYDRATE 160; 4.5 UG/1; UG/1
2 AEROSOL RESPIRATORY (INHALATION) 2 TIMES DAILY
COMMUNITY
Start: 2023-01-23 | End: 2023-10-30 | Stop reason: SDUPTHER

## 2023-02-01 RX ADMIN — IPRATROPIUM BROMIDE AND ALBUTEROL SULFATE 3 ML: .5; 3 SOLUTION RESPIRATORY (INHALATION) at 07:02

## 2023-02-01 RX ADMIN — LEVETIRACETAM 500 MG: 100 INJECTION, SOLUTION INTRAVENOUS at 09:02

## 2023-02-01 RX ADMIN — SENNOSIDES AND DOCUSATE SODIUM 1 TABLET: 50; 8.6 TABLET ORAL at 09:02

## 2023-02-01 RX ADMIN — HEPARIN SODIUM 5000 UNITS: 5000 INJECTION INTRAVENOUS; SUBCUTANEOUS at 12:02

## 2023-02-01 RX ADMIN — SODIUM CHLORIDE: 9 INJECTION, SOLUTION INTRAVENOUS at 12:02

## 2023-02-01 RX ADMIN — SODIUM CHLORIDE: 9 INJECTION, SOLUTION INTRAVENOUS at 09:02

## 2023-02-01 RX ADMIN — IPRATROPIUM BROMIDE AND ALBUTEROL SULFATE 3 ML: .5; 3 SOLUTION RESPIRATORY (INHALATION) at 12:02

## 2023-02-01 RX ADMIN — LEVETIRACETAM 500 MG: 100 INJECTION, SOLUTION INTRAVENOUS at 01:02

## 2023-02-01 RX ADMIN — DEXMEDETOMIDINE HYDROCHLORIDE 0.2 MCG/KG/HR: 4 INJECTION, SOLUTION INTRAVENOUS at 04:02

## 2023-02-01 RX ADMIN — HEPARIN SODIUM 5000 UNITS: 5000 INJECTION INTRAVENOUS; SUBCUTANEOUS at 10:02

## 2023-02-01 RX ADMIN — FAMOTIDINE 20 MG: 20 TABLET, FILM COATED ORAL at 08:02

## 2023-02-01 RX ADMIN — DEXMEDETOMIDINE HYDROCHLORIDE 0.4 MCG/KG/HR: 4 INJECTION, SOLUTION INTRAVENOUS at 10:02

## 2023-02-01 RX ADMIN — ASPIRIN 81 MG: 81 TABLET, CHEWABLE ORAL at 08:02

## 2023-02-01 RX ADMIN — PROPOFOL 35 MCG/KG/MIN: 10 INJECTION, EMULSION INTRAVENOUS at 08:02

## 2023-02-01 RX ADMIN — SILODOSIN 4 MG: 4 CAPSULE ORAL at 08:02

## 2023-02-01 RX ADMIN — FENTANYL CITRATE 25 MCG: 50 INJECTION INTRAMUSCULAR; INTRAVENOUS at 12:02

## 2023-02-01 RX ADMIN — FENTANYL CITRATE 50 MCG: 50 INJECTION INTRAMUSCULAR; INTRAVENOUS at 10:02

## 2023-02-01 RX ADMIN — FENTANYL CITRATE 25 MCG: 50 INJECTION INTRAMUSCULAR; INTRAVENOUS at 08:02

## 2023-02-01 RX ADMIN — ATORVASTATIN CALCIUM 40 MG: 40 TABLET, FILM COATED ORAL at 08:02

## 2023-02-01 NOTE — NURSING
Patient arrived to [facility] from [facility] by [id number]    Report received from: OSH Beverley TERAN    Type of stroke/diagnosis:  CVA    Current symptoms: intubated, moves bilateral upper extremities spontaneously, moves lower extremities spontaneously, cough, gag, and corneal reflexes present    Skin assessment done: Yes   Wounds noted: none    *If wounds noted, was Wound Care consulted? No     [place] Completed? No, pt intubated    Patient Belongings on Admit: blue fleece jacket, black tshirt, brown beanie, cell phone, inhaler, $0.91 in change, pair of black shoes, black pants, brown belt, plaid boxers, grey sock, brown wallet, bank card, debit card, $1 bill, ID    [facility] notified: LUIZ Mckeon

## 2023-02-01 NOTE — ED NOTES
Pt has made several attempts to try and leave the unit. PA notified that pt was attempting to leave.

## 2023-02-01 NOTE — ASSESSMENT & PLAN NOTE
- permissive htn  - SBP <220   - nicardipine as needed    - Echo 1/31: There is no evidence of intracardiac shunting.   The left ventricle is normal in size with concentric remodeling and normal systolic function.   The estimated ejection fraction is 65%.   Normal left ventricular diastolic function.   Normal right ventricular size with normal right ventricular systolic function.   Normal central venous pressure (3 mmHg).   The estimated PA systolic pressure is 7 mmHg.   There is no pulmonary hypertension.     Self

## 2023-02-01 NOTE — SUBJECTIVE & OBJECTIVE
Past Medical History:   Diagnosis Date    Prostate atrophy      History reviewed. No pertinent surgical history.  History reviewed. No pertinent family history.  Social History     Tobacco Use    Smoking status: Some Days     Packs/day: 1.00     Types: Cigarettes   Substance Use Topics    Alcohol use: Yes     Alcohol/week: 1.0 standard drink     Types: 1 Cans of beer per week     Comment: 1/31/23: Denies    Drug use: No     Review of patient's allergies indicates:   Allergen Reactions    Penicillins Hives       Medications: I have reviewed the current medication administration record.    Medications Prior to Admission   Medication Sig Dispense Refill Last Dose    albuterol (PROVENTIL/VENTOLIN HFA) 90 mcg/actuation inhaler Inhale 2 puffs into the lungs every 6 (six) hours as needed (cough and shortness of breath). Rescue 1 Inhaler 0 1/31/2023    tamsulosin (FLOMAX) 0.4 mg Cap Take 0.4 mg by mouth once daily.   1/30/2023    UNABLE TO FIND Take 2.5 mg by mouth nightly. medication name:  sleep aid (unknown name)   1/30/2023    albuterol sulfate 2.5 mg/0.5 mL Nebu Take 2.5 mg by nebulization every 4 (four) hours as needed. Rescue 30 each 0 Unknown       Review of Systems   Constitutional:  Negative for chills and fever.   Eyes:  Negative for pain and visual disturbance.   Cardiovascular:  Negative for chest pain and palpitations.   Gastrointestinal:  Negative for abdominal distention, abdominal pain, nausea and vomiting.   Genitourinary:  Negative for decreased urine volume, difficulty urinating and dysuria.   Musculoskeletal:  Negative for neck stiffness.   Skin:  Negative for rash.   Neurological:  Positive for seizures. Negative for dizziness, weakness and headaches.   Psychiatric/Behavioral:  Negative for confusion.    Objective:     Vital Signs (Most Recent):  Temp: 97.7 °F (36.5 °C) (01/31/23 2127)  Pulse: 63 (01/31/23 2335)  Resp: 18 (01/31/23 2335)  BP: (!) 177/80 (01/31/23 2335)  SpO2: 100 % (01/31/23  6555)    Vital Signs Range (Last 24H):  Temp:  [93 °F (33.9 °C)-97.8 °F (36.6 °C)]   Pulse:  []   Resp:  [16-41]   BP: (125-245)/()   SpO2:  [96 %-100 %]     Physical Exam  Vitals and nursing note reviewed.   Constitutional:       Appearance: He is underweight. He is ill-appearing.      Interventions: He is intubated.   HENT:      Head: Normocephalic.      Mouth/Throat:      Mouth: Mucous membranes are dry.   Eyes:      General: No scleral icterus.  Cardiovascular:      Rate and Rhythm: Normal rate.      Pulses: Normal pulses.   Pulmonary:      Effort: He is intubated.   Abdominal:      General: Abdomen is flat. There is no distension.      Palpations: Abdomen is soft.   Skin:     General: Skin is dry.      Coloration: Skin is not jaundiced.       Neurological Exam:   LOC: obtunded  Attention Span: poor  Language: Mute, Intubated  Articulation: Untestable due to intubation  Orientation: Untestable due to intubation  EOM (CN III, IV, VI): No apparent gaze deviation  Pupils (CN II, III): 2mm equal and reactive  Facial Sensation (CN V): Corneal reflex present Bilateral  Gag Reflex: present  Reflexes: 2+ in UE, 3+ LE, no clonus, negative escalante  Motor: Moving all extremities against resistance 4-/5  Tone: Normal tone throughout        Laboratory:  CMP:   Recent Labs   Lab 01/31/23  0845   CALCIUM 9.3   ALBUMIN 4.0   PROT 7.9      K 3.3*   CO2 28      BUN 7*   CREATININE 1.0   ALKPHOS 119   ALT 11   AST 21   BILITOT 0.8     CBC:   Recent Labs   Lab 01/31/23  0845   WBC 4.30   RBC 5.94   HGB 18.0   HCT 54.0      MCV 91   MCH 30.3   MCHC 33.3     Lipid Panel:   Recent Labs   Lab 01/31/23  0936   CHOL 176   LDLCALC 108.2   HDL 55   TRIG 64     Coagulation: No results for input(s): PT, INR, APTT in the last 168 hours.  Hgb A1C: No results for input(s): HGBA1C in the last 168 hours.  TSH:   Recent Labs   Lab 01/31/23  0845   TSH 2.132       Diagnostic Results:      Brain imaging:    MRI  1/31/2023 @ [facility]  FINDINGS:  The craniocervical junction is intact.  The sellar and parasellar structures appear unremarkable.  The intracranial flow voids are within normal limits.     There is extensive diffusion restriction in the right temporal and parietal lobes along with diffusion restriction in the right subinsular cortex and right posterior frontal lobe.  The left cerebral hemisphere and cerebellum demonstrates no diffusion weighted signal abnormality.     The ventricles and sulci are prominent, consistent cerebral volume loss.  There are T2/FLAIR signal hyperintensities within the periventricular and subcortical white matter.  There also T2/FLAIR signal hyperintensities within the regions of diffusion weighted signal abnormalities.  There is no evidence of intracranial hemorrhage.     The orbits and intraorbital contents are unremarkable.  The paranasal sinuses are unremarkable.  The mastoid air cells are clear.  The calvarium is intact.     Impression:     Extensive foci of diffusion restriction in the right MCA distribution as above, consistent acute right MCA territory infarction.  No evidence of hemorrhagic conversion.     Changes of chronic small vessel ischemic disease and cerebral volume loss.      Vessel Imaging:  CTA 1/31/2023 @ [facility]    FINDINGS:  Intracranial Compartment:     Stable cerebral atrophy and chronic small vessel ischemic changes no extra-axial blood or fluid collections.     The brain parenchyma demonstrates acute right MCA territorial infarction.  Remote lacunar infarct is seen in the left caudate head.  No parenchymal mass, hemorrhage, edema, or major vascular distribution infarct.     Skull/Extracranial Contents (limited evaluation): No fracture. Mastoid air cells and paranasal sinuses are essentially clear.     Non-Vascular Structures of the Neck/Thoracic Inlet (limited evaluation): Bullous emphysematous lungs.  Biapical scarring or fibrosis     Aorta: Normal 3 vessel arch.      Extracranial carotid circulation: No hemodynamically significant stenosis, aneurysmal dilatation, or dissection.     Extracranial vertebral circulation: Left vertebral artery hypoplastic.  No hemodynamically significant stenosis, aneurysmal dilatation, or dissection.     Intracranial Arteries: Concern for small thrombus with stenosis involving the inferior branch of the M2 segment of the right MCA.  There is some flow seen in the more distal MCA vessels.     Venous structures (limited evaluation): Normal.     Impression:     Acute right MCA distribution infarct.  Concern for small thrombus with stenosis and or diminished flow involving the inferior branch of the M2 segment of the right MCA.     Remote left caudate head lacunar infarct.     Bullous emphysematous changes.    Cardiac Evaluation:   Echo 1/31/2023   There is no evidence of intracardiac shunting.  The left ventricle is normal in size with concentric remodeling and normal systolic function.  The estimated ejection fraction is 65%.  Normal left ventricular diastolic function.  Normal right ventricular size with normal right ventricular systolic function.  Normal central venous pressure (3 mmHg).  The estimated PA systolic pressure is 7 mmHg.  There is no pulmonary hypertension.

## 2023-02-01 NOTE — ASSESSMENT & PLAN NOTE
"Physical Therapy Evaluation completed.   Bed Mobility:  Supine to Sit: Stand by Assist  Transfers: Sit to Stand: Stand by Assist  Gait: Level Of Assist: Stand by Assist with Front-Wheel Walker       Plan of Care: Will benefit from Physical Therapy 4 times per week  Discharge Recommendations: Equipment: Will Continue to Assess for Equipment Needs. Post-acute therapy Discharge to a transitional care facility for continued skilled therapy services.    See \"Rehab Therapy-Acute\" Patient Summary Report for complete documentation.     " Stroke risk factor    Patient hypertensive as high as 245/121    -- on nisha

## 2023-02-01 NOTE — CONSULTS
[facility] Critical [facility]  Adult Nutrition  Consult Note    SUMMARY     Recommendations    1. When medically able, initate TF regimen of Impact Peptide @ 60 mL/hr- provides 2160 kcals, 135 g pro, and 1109 mL free water.   2. If able to tolerate PO intake, ADAT to regular- texture per SLP.   3. RD following.    Goals: Will meet % EEN/EPN by next RD f/u.  Nutrition Goal Status: new  Communication of RD Recs: reviewed with RN    Assessment and Plan    Nutrition Problem  Inadequate oral intake     Related to (etiology):   Inability to consume sufficient needs     Signs and Symptoms (as evidenced by):   NPO status      Interventions/Recommendations (treatment strategy):  Collaboration of nutrition care with other providers   EN     Nutrition Diagnosis Status:   New     Malnutrition Assessment    Orbital Region (Subcutaneous Fat Loss): mild depletion  Upper Arm Region (Subcutaneous Fat Loss): mild depletion   Yazidism Region (Muscle Loss): mild depletion  Clavicle Bone Region (Muscle Loss): mild depletion  Clavicle and Acromion Bone Region (Muscle Loss): mild depletion     Reason for Assessment    Reason For Assessment: consult  Diagnosis: stroke/CVA  Relevant Medical History: HTN, prostate atrophy  Interdisciplinary Rounds: did not attend  General Information Comments: RD consulted for stroke pathway- pt not appropriate for education. Unable to speak with pt 2/2 being intubated and no family present at bedside. Unsure intake PTA. NPO with no TF regimen. Wt on 10/9/22 was 120# and on 12/7/22 was 124#. #. NFPE completed 2/1 and found mild fat and muscle wasting. Pt does not meet criteria of malnutrition at this time 2/2 lack of info regarding intake PTA and wt loss. Pt at high risk for malnutrition. LBM 1/30.  Nutrition Discharge Planning: Pending clinical course    Nutrition Risk Screen    Nutrition Risk Screen: difficulty chewing/swallowing    Nutrition/Diet History    Spiritual, Cultural Beliefs,  "Anabaptism Practices, Values that Affect Care: no  Food Allergies: NKFA  Factors Affecting Nutritional Intake: NPO, on mechanical ventilation, difficulty/impaired swallowing    Anthropometrics    Temp: 97.1 °F (36.2 °C)  Height Method: Stated  Height: 6' 1" (185.4 cm)  Height (inches): 73 in  Weight Method: Bed Scale  Weight: 55.8 kg (123 lb 0.3 oz)  Weight (lb): 123.02 lb  Ideal Body Weight (IBW), Male: 184 lb  % Ideal Body Weight, Male (lb): 66.86 %  BMI (Calculated): 16.2  BMI Grade: 16 - 16.9 protein-energy malnutrition grade II    Lab/Procedures/Meds    Pertinent Labs Reviewed: reviewed  Pertinent Labs Comments: Glucose 115, Sodium 135  Pertinent Medications Reviewed: reviewed  Pertinent Medications Comments: aspirin, atorvastatin, famotidine, senna-docusate    Estimated/Assessed Needs    Weight Used For Calorie Calculations: 83.5 kg (184 lb)  Energy Calorie Requirements (kcal): 2090 kcals  Energy Need Method: Kcal/kg (25 kcal/kg IBW)  Protein Requirements: 125-167 g (1.5-2.0 g/kg IBW)  Weight Used For Protein Calculations: 83.5 kg (184 lb)  Fluid Requirements (mL): 1 mL/kcal or fluid per MD  Estimated Fluid Requirement Method: RDA Method  RDA Method (mL): 2090    Nutrition Prescription Ordered    Current Diet Order: NPO    Evaluation of Received Nutrient/Fluid Intake    I/O: +561.6 ml since admit  % Intake of Estimated Energy Needs: 0%  % Meal Intake: NPO    Nutrition Risk    Level of Risk/Frequency of Follow-up:  (1 time/week)     Monitor and Evaluation    Food and Nutrient Intake: enteral nutrition intake  Food and Nutrient Adminstration: enteral and parenteral nutrition administration  Knowledge/Beliefs/Attitudes: food and nutrition knowledge/skill, beliefs and attitudes  Physical Activity and Function: nutrition-related ADLs and IADLs  Anthropometric Measurements: body mass index, weight change, weight, height/length  Biochemical Data, Medical Tests and Procedures: gastrointestinal profile, electrolyte and " renal panel, lipid profile, inflammatory profile, glucose/endocrine profile  Nutrition-Focused Physical Findings: overall appearance     Nutrition Follow-Up    RD Follow-up?: Yes    Juani Ellis, Registration Eligible, Provisional LDN

## 2023-02-01 NOTE — ED NOTES
Patients oxygen sats in 50's with good wave form on NRB, dr Palomo at bedside to intubate patient.

## 2023-02-01 NOTE — SUBJECTIVE & OBJECTIVE
Review of Systems: Unable to obtain a complete ROS due to level of consciousness.    Vitals:   Temp: 97.8 °F (36.6 °C)  Pulse: 67  Rhythm: normal sinus rhythm  BP: (!) 174/72  MAP (mmHg): 104  Resp: (!) 24  SpO2: 98 %  Oxygen Concentration (%): 40  Vent Mode: Spont  Set Rate: 20 BPM  Vt Set: 470 mL  Pressure Support: 5 cmH20  PEEP/CPAP: 5 cmH20  Peak Airway Pressure: 10 cmH20  Mean Airway Pressure: 8.2 cmH20  Plateau Pressure: 0 cmH20    Temp  Min: 93 °F (33.9 °C)  Max: 97.8 °F (36.6 °C)  Pulse  Min: 60  Max: 112  BP  Min: 125/61  Max: 245/121  MAP (mmHg)  Min: 86  Max: 174  Resp  Min: 16  Max: 35  SpO2  Min: 96 %  Max: 100 %  Oxygen Concentration (%)  Min: 40  Max: 100    01/31 0701 - 02/01 0700  In: 978.3 [I.V.:488]  Out: 550 [Urine:550]   Unmeasured Output  Stool Occurrence: 0     Examination:   Constitutional: Well-nourished and -developed. No apparent distress.   Eyes: Conjunctiva clear, anicteric. Lids no lesions.  Head/Ears/Nose/Mouth/Throat/Neck: Moist mucous membranes. External ears, nose atraumatic.   Cardiovascular: Regular rhythm. No leg edema.  Respiratory: Comfortable respirations. Clear to auscultation.  Gastrointestinal: Soft, nondistended, nontender. + bowel sounds.    Neurologic:  -GCS E 3 V 4 M 6  -Drowsy. Oriented to person, place, not time. Delayed responses. Follows commands.  -Cranial nerves: EOM intact, PERRL, no facial droop, + cough  -Motor: Moves all extremities spontaneously, antigravity  -Sensation: Intact to light touch  Unable to test judgment, insight, fund of knowledge, tongue protrusion, coordination, gait due to level of consciousness.    Medications:   Continuoussodium chloride 0.9%, Last Rate: 50 mL/hr at 02/01/23 1105  dexmedetomidine (PRECEDEX) infusion, Last Rate: 0.4 mcg/kg/hr (02/01/23 1105)  sodium chloride 0.9%    Scheduledalbuterol-ipratropium, 3 mL, Q6H  aspirin, 81 mg, Daily  atorvastatin, 40 mg, Daily  famotidine, 20 mg, Daily  heparin (porcine), 5,000 Units,  Q8H  senna-docusate 8.6-50 mg, 1 tablet, BID  silodosin, 4 mg, Daily    PRNacetaminophen, 500 mg, Q6H PRN  fentaNYL, 50 mcg, Q2H PRN  ondansetron, 4 mg, Q8H PRN  sodium chloride 0.9%, 500 mL, Continuous PRN  sodium chloride 0.9%, 10 mL, PRN  sodium chloride 0.9%, 10 mL, PRN       Today I independently reviewed pertinent medications, lines/drains/airways, imaging, cardiology results, laboratory results, microbiology results, notably:     ISTAT:   Recent Labs   Lab 02/01/23  0847   PH 7.348*   PCO2 45.1*   PO2 174*   POCSATURATED 99   HCO3 24.8   BE -1   POCTCO2 26   SAMPLE ARTERIAL      Chem:   Recent Labs   Lab 02/01/23  0207   *   K 4.8      CO2 18*   *   BUN 9   CREATININE 1.0   CALCIUM 9.0   MG 2.0   PHOS 2.9   ANIONGAP 12   PROT 7.2   ALBUMIN 3.7   BILITOT 0.8   ALKPHOS 115   AST 31   ALT 14     Heme:   Recent Labs   Lab 02/01/23  0207   WBC 10.16   HGB 17.4   HCT 53.4        Endo: No results for input(s): POCTGLUCOSE in the last 24 hours.

## 2023-02-01 NOTE — PROGRESS NOTES
Subjective: previous admitted for RUE weakness ongoing for 2 weeks and MRI positive for CVA. After admission the patient developed altered mental status with an episode of emesis after receiving IV contrast for CTA. He was moved to room 17 in the ED where he was persistently hypoxic. He required intubation by ED staff.     Objective:  Pulse 87 BP: 180/77 O2 saturations of 50% on RA  General: appears stated age  Cardiac: RRR  Pulmonary minimal wheezing bilaterally  Neuro: persistent rightward gaze does not respond to voice or sternal rub. Actively urinating and defecating.     Plan:  Acute hypoxemic respiratory failure  Suspect related to seizure as with right bell gaze and defecating and urinating on self  Intubated by ED staff   Will check chest x-ray and place NG/[place]  ABG pending  Pulmonary consulted for vent management    Encephalopathy  Concern for seizure related to CVA given incontinence, sudden loss of responsiveness and gaze preference.  Will treat with ativan. Sedated with propofol and fentanyl  Will hold giving plavix until repeat head CT  Will discuss with neuro critical care.  Per chart review has previously received IV contrast in 2017 without reaction.     ADDENDUM: CTA images able to be obtained and pending. Discussed with neuro critical care Dr. Cobian who recommended transfer to neuro critical care unit at [facility] and continuing propofol and fentanyl for sedation. Loaded with 3g of Keppra as discussed with Dr. Cobian.     BP increasing will begin cardene to keep SBP less than 220. Developed hypothermia after suspected seizure placed on baher hugger.     ADDENDUM: CTA returned with concern for stenosis or diminished flow involving the inferior branch of the M2 segment of the right MCA.   Discussed with Dr. Wills who recommended resuming aspirin. And goal BP of 160 to 180 SBP. He is not a revascularization candidate given chronicity of symptoms and age of infarct.     Wally Moss  COLT  Department of Hospital Medicine   [facility]    Critical care time spent on the evaluation and treatment of severe organ dysfunction, review of pertinent labs and imaging studies, discussions with consulting providers and discussions with patient/family: 60 minutes.

## 2023-02-01 NOTE — PROGRESS NOTES
[facility] - Neuro Critical Care  Neurocritical Care  Progress Note    Admit Date: 1/31/2023  Service Date: 02/01/2023  Length of Stay: 1    Subjective:     Chief Complaint: Acute right MCA stroke    History of Present Illness: Gabriel Springer 77 y.o. male with BPH, tobacco abuse, and asthma who presents to [facility] for R MCA infarct and encephalopathy. He presented to OSH with a chief complaint of left arm weakness.  He reports 2 weeks of left arm weakness and numbness that has been constant without aggravating alleviating factors.  He attempted no treatment at home.  He finds he is unable to pick things up with his left hand.  He denies any slurred speech or difficulty swallowing and no numbness of his legs. He finds he has been having trouble walking at home with increasing stumbling. While in ED OSH he had AMS and became hypoxic requiring intubation. There was concern for seizure. He was given keppra 3 gm and ativan 2mg. He is being admitted to [facility] for a higher level of care. History from chart due to LOC and intubation.     Hospital Course: 02/01/2023: Patient extubated to nasal cannula. Tolerating well. Will consult SLP for swallowing eval. EEG in place, read pending.    Review of Systems: Unable to obtain a complete ROS due to level of consciousness.    Vitals:   Temp: 97.8 °F (36.6 °C)  Pulse: 67  Rhythm: normal sinus rhythm  BP: (!) 174/72  MAP (mmHg): 104  Resp: (!) 24  SpO2: 98 %  Oxygen Concentration (%): 40  Vent Mode: Spont  Set Rate: 20 BPM  Vt Set: 470 mL  Pressure Support: 5 cmH20  PEEP/CPAP: 5 cmH20  Peak Airway Pressure: 10 cmH20  Mean Airway Pressure: 8.2 cmH20  Plateau Pressure: 0 cmH20    Temp  Min: 93 °F (33.9 °C)  Max: 97.8 °F (36.6 °C)  Pulse  Min: 60  Max: 112  BP  Min: 125/61  Max: 245/121  MAP (mmHg)  Min: 86  Max: 174  Resp  Min: 16  Max: 35  SpO2  Min: 96 %  Max: 100 %  Oxygen Concentration (%)  Min: 40  Max: 100    01/31 0701 - 02/01 0700  In: 978.3 [I.V.:488]  Out: 550 [Urine:550]   Unmeasured  Output  Stool Occurrence: 0     Examination:   Constitutional: Well-nourished and -developed. No apparent distress.   Eyes: Conjunctiva clear, anicteric. Lids no lesions.  Head/Ears/Nose/Mouth/Throat/Neck: Moist mucous membranes. External ears, nose atraumatic.   Cardiovascular: Regular rhythm. No leg edema.  Respiratory: Comfortable respirations. Clear to auscultation.  Gastrointestinal: Soft, nondistended, nontender. + bowel sounds.    Neurologic:  -GCS E 3 V 4 M 6  -Drowsy. Oriented to person, place, not time. Delayed responses. Follows commands.  -Cranial nerves: EOM intact, PERRL, no facial droop, + cough  -Motor: Moves all extremities spontaneously, antigravity  -Sensation: Intact to light touch  Unable to test judgment, insight, fund of knowledge, tongue protrusion, coordination, gait due to level of consciousness.    Medications:   Continuoussodium chloride 0.9%, Last Rate: 50 mL/hr at 02/01/23 1105  dexmedetomidine (PRECEDEX) infusion, Last Rate: 0.4 mcg/kg/hr (02/01/23 1105)  sodium chloride 0.9%    Scheduledalbuterol-ipratropium, 3 mL, Q6H  aspirin, 81 mg, Daily  atorvastatin, 40 mg, Daily  famotidine, 20 mg, Daily  heparin (porcine), 5,000 Units, Q8H  senna-docusate 8.6-50 mg, 1 tablet, BID  silodosin, 4 mg, Daily    PRNacetaminophen, 500 mg, Q6H PRN  fentaNYL, 50 mcg, Q2H PRN  ondansetron, 4 mg, Q8H PRN  sodium chloride 0.9%, 500 mL, Continuous PRN  sodium chloride 0.9%, 10 mL, PRN  sodium chloride 0.9%, 10 mL, PRN       Today I independently reviewed pertinent medications, lines/drains/airways, imaging, cardiology results, laboratory results, microbiology results, notably:     ISTAT:   Recent Labs   Lab 02/01/23  0847   PH 7.348*   PCO2 45.1*   PO2 174*   POCSATURATED 99   HCO3 24.8   BE -1   POCTCO2 26   SAMPLE ARTERIAL      Chem:   Recent Labs   Lab 02/01/23  0207   *   K 4.8      CO2 18*   *   BUN 9   CREATININE 1.0   CALCIUM 9.0   MG 2.0   PHOS 2.9   ANIONGAP 12   PROT 7.2    ALBUMIN 3.7   BILITOT 0.8   ALKPHOS 115   AST 31   ALT 14     Heme:   Recent Labs   Lab 02/01/23  0207   WBC 10.16   HGB 17.4   HCT 53.4        Endo: No results for input(s): POCTGLUCOSE in the last 24 hours.       Assessment/Plan:     Neuro  * Acute right MCA stroke  - VN following   - No TNK and not a candidate for intervention per VN  - MRI brain Extensive foci of diffusion restriction in the right MCA distribution as above, consistent acute right MCA territory infarction.  No evidence of hemorrhagic conversion.  - CTA: Acute right MCA distribution infarct.  Concern for small thrombus with stenosis and or diminished flow involving the inferior branch of the M2 segment of the right MCA. Remote left caudate head lacunar infarct.  - SBP Goal < 220  - Q 1 vitals   - Q 1 neuro checks   - TSH, A1c, lipid, echo and EKG  - Atorvastatin Daily   - Aspirin, SQ heparin   - PT/OT/SLP eval and treat  - NPO until passes bedside swallow and evaluated by SLP    Seizure  - Possible TC seizure at OSH, loaded with Keppra 3gms  - EEG in place, read pending  - Keppra 500mg BID    Encephalopathy  Rule out seizure  No signs of infection currently    Cytotoxic brain edema  - see stroke    Pulmonary  Asthma  - duo nebs Q 6     Cardiac/Vascular  HTN (hypertension)  - Permissive HTN in setting of acute stroke  - SBP <220      - Echo 1/31: There is no evidence of intracardiac shunting.   The left ventricle is normal in size with concentric remodeling and normal systolic function.   The estimated ejection fraction is 65%.   Normal left ventricular diastolic function.   Normal right ventricular size with normal right ventricular systolic function.   Normal central venous pressure (3 mmHg).   The estimated PA systolic pressure is 7 mmHg.   There is no pulmonary hypertension.      Other  Impaired activities of daily living  - PT/OT eval and treat when appropriate     Endotracheally intubated  --Intubated at OSH  --Extubated  today after appropriate parameters and + cuff leak, tolerating NC      Tobacco abuse  - nicotine patch PRN    The patient is being Prophylaxed for:  Venous Thromboembolism with: Mechanical or Chemical  Stress Ulcer with: Not Applicable   Ventilator Pneumonia with: not applicable    Activity Orders          Turn patient starting at 02/01 0000    Elevate HOB starting at 01/31 2245    Diet NPO: NPO starting at 01/31 2245    Progressive Mobility Protocol (mobilize patient to their highest level of functioning at least twice daily) starting at 01/31 1800    Elevate HOB Elevate (30-45 degrees) starting at 01/31 1323    Straight Cath starting at 01/31 1322        Full Code     Critical condition in that Patient has a condition that poses threat to life and bodily function: management of mechanical ventilation, encephalopathy, Acute R MCA stroke     35 minutes of Critical care time was spent personally by me on the following activities: development of treatment plan with patient or surrogate and bedside caregivers, discussions with consultants, evaluation of patient's response to treatment, examination of patient, ordering and performing treatments and interventions, ordering and review of laboratory studies, ordering and review of radiographic studies, pulse oximetry, antibiotic titration if applicable, vasopressor titration if applicable, re-evaluation of patient's condition. This critical care time did not overlap with that of any other provider or involve time for any procedures. There is high probability for acute neurological change leading to clinical and possibly life-threatening deterioration requiring highest level of physician preparedness for urgent intervention.    Mickie Proter, NP  Neurocritical Care  [facility] - [facility]

## 2023-02-01 NOTE — CONSULTS
[facility] - Neuro Critical Care  Vascular Neurology  Comprehensive Stroke Center  Consult Note    Inpatient consult to Vascular (Stroke) Neurology  Consult performed by: Arslan Edgar MD  Consult ordered by: Jerry Mckeon NP        Assessment/Plan:     Patient is a 77 y.o. year old male with:    * Acute right MCA stroke  77 y.o. male, with R MCA infarct and encephalopathy [place] [facility] [place] Symptoms include left arm weakness and numbness that has been constant without aggravating alleviating factors for 2 weeks.     MRI with diffusion restriction in the right temporal and parietal lobes along with diffusion restriction in the right subinsular cortex and right posterior frontal lobe. CTA with concern for small thrombus with stenosis and or diminished flow involving the inferior branch of the M2 segment of the right MCA.    Antithrombotics for secondary stroke prevention: Antiplatelets: Aspirin: 81 mg daily and Clopidogrel: 75 mg daily when safe to start given size of stroke & hemicraniotomy watch    Statins for secondary stroke prevention and hyperlipidemia, if present:   Statins: Atorvastatin- 40 mg daily    Aggressive risk factor modification: HTN, HLD     Rehab efforts: The patient has been evaluated by a stroke team provider and the therapy needs have been fully considered based off the presenting complaints and exam findings. The following therapy evaluations are needed: PT evaluate and treat, OT evaluate and treat, SLP evaluate and treat    Diagnostics ordered/pending: None     VTE prophylaxis: Heparin 5000 units SQ every 8 hours    BP parameters: Infarct: No intervention, SBP <220        HTN (hypertension)  Stroke risk factor    Patient hypertensive as high as 245/121    -- on cardine    Cytotoxic brain edema  See stroke    Tobacco abuse  Stroke risk factor    --  on smoking cessation once appropriate        STROKE DOCUMENTATION     Acute Stroke Times   Stroke Team Called Date:  01/31/23  Stroke Team Called Time: 2230  Stroke Team Arrival Date: 01/31/23  Stroke Team Arrival Time: 2235  CT Interpretation Time: 2225 (done at OSH)  Thrombolytic Therapy Recommended: No  CTA Interpretation Time: 2225 (done at OSH)  Thrombectomy Recommended: No  MRI Acute Stroke Protocol Interpretation Time: 2225 (done at OSH)    NIH Scale:  Interval: baseline  1a. Level of Consciousness: 2-->Not alert, requires repeated stimulation to attend, or is obtunded and requires strong or painful stimulation to make movements (not stereotyped)  1b. LOC Questions: 2-->Answers neither question correctly  1c. LOC Commands: 2-->Performs neither task correctly  2. Best Gaze: 2-->Forced deviation, or total gaze paresis not overcome by the oculocephalic maneuver  3. Visual: 0-->No visual loss  4. Facial Palsy: 3-->Complete paralysis of one or both sides (absence of facial movement in the upper and lower face)  5a. Motor Arm, Left: 2-->Some effort against gravity, limb cannot get to or maintain (if cued) 90 (or 45) degrees, drifts down to bed, but has some effort against gravity  5b. Motor Arm, Right: 2-->Some effort against gravity, limb cannot get to or maintain (if cued) 90 (or 45) degrees, drifts down to bed, but has some effort against gravity  6a. Motor Leg, Left: 2-->Some effort against gravity, leg falls to bed by 5 secs, but has some effort against gravity  6b. Motor Leg, Right: 2-->Some effort against gravity, leg falls to bed by 5 secs, but has some effort against gravity  7. Limb Ataxia: 0-->Absent  8. Sensory: 2-->Severe to total sensory loss, patient is not aware of being touched in the face, arm, and leg  9. Best Language: 3-->Mute, global aphasia, no usable speech or auditory comprehension  10. Dysarthria: (UN) Intubated or other physical barrier  11. Extinction and Inattention (formerly Neglect): 2-->Profound neena-inattention/extinction more than 1 modality  Total (NIH Stroke Scale): 26    Modified Lakshmi Score:  1  Daniel Coma Scale:    ABCD2 Score:    [id number] Score:   HAS -BLED Score:   ICH Score:   Hunt & Sierra Classification:       Thrombolysis Candidate? No, Out of window - Symptom onset > 4.5 hours    Delays to Thrombolysis?  Not Applicable    Interventional Revascularization Candidate?   Is the patient eligible for mechanical endovascular reperfusion (CHANTAL)?  No; Large core infarct on imaging     Delays to Thrombectomy? Not Applicable    Hemorrhagic change of an Ischemic Stroke: Does this patient have an ischemic stroke with hemorrhagic changes? No     Subjective:     History of Present Illness:  Mr Gabriel Springer is a 77 y.o. male, here for R MCA infarct and encephalopathy as a transfer from [facility] [place]. He has a PMHx of tobacco abuse and asthma. He presented to OSH l with a chief complaint of left arm weakness.  He reports 2 weeks of left arm weakness and numbness that has been constant without aggravating alleviating factors.  He attempted no treatment at home.  He finds he is unable to pick things up with his left hand.  He denies any slurred speech or difficulty swallowing and no numbness of his legs. He finds he has been having trouble walking at home with increasing stumbling.  He denies fever chest pain shortness of breath nausea vomiting abdominal pain leg swelling syncope dizziness dysuria melena hematuria hematemesis. While in ED OSH he had AMS and became hypoxic requiring intubation. There was concern for seizure he was given keppra 3 g and ativan 2mg. He is now admitted to [facility] for a higher level of care. History from chart due to LOC and intubation.           Past Medical History:   Diagnosis Date    Prostate atrophy      History reviewed. No pertinent surgical history.  History reviewed. No pertinent family history.  Social History     Tobacco Use    Smoking status: Some Days     Packs/day: 1.00     Types: Cigarettes   Substance Use Topics    Alcohol use: Yes     Alcohol/week: 1.0 standard drink      Types: 1 Cans of beer per week     Comment: 1/31/23: Denies    Drug use: No     Review of patient's allergies indicates:   Allergen Reactions    Penicillins Hives       Medications: I have reviewed the current medication administration record.    Medications Prior to Admission   Medication Sig Dispense Refill Last Dose    albuterol (PROVENTIL/VENTOLIN HFA) 90 mcg/actuation inhaler Inhale 2 puffs into the lungs every 6 (six) hours as needed (cough and shortness of breath). Rescue 1 Inhaler 0 1/31/2023    tamsulosin (FLOMAX) 0.4 mg Cap Take 0.4 mg by mouth once daily.   1/30/2023    UNABLE TO FIND Take 2.5 mg by mouth nightly. medication name:  sleep aid (unknown name)   1/30/2023    albuterol sulfate 2.5 mg/0.5 mL Nebu Take 2.5 mg by nebulization every 4 (four) hours as needed. Rescue 30 each 0 Unknown       Review of Systems   Constitutional:  Negative for chills and fever.   Eyes:  Negative for pain and visual disturbance.   Cardiovascular:  Negative for chest pain and palpitations.   Gastrointestinal:  Negative for abdominal distention, abdominal pain, nausea and vomiting.   Genitourinary:  Negative for decreased urine volume, difficulty urinating and dysuria.   Musculoskeletal:  Negative for neck stiffness.   Skin:  Negative for rash.   Neurological:  Positive for seizures. Negative for dizziness, weakness and headaches.   Psychiatric/Behavioral:  Negative for confusion.    Objective:     Vital Signs (Most Recent):  Temp: 97.7 °F (36.5 °C) (01/31/23 2127)  Pulse: 63 (01/31/23 2335)  Resp: 18 (01/31/23 2335)  BP: (!) 177/80 (01/31/23 2335)  SpO2: 100 % (01/31/23 2335)    Vital Signs Range (Last 24H):  Temp:  [93 °F (33.9 °C)-97.8 °F (36.6 °C)]   Pulse:  []   Resp:  [16-41]   BP: (125-245)/()   SpO2:  [96 %-100 %]     Physical Exam  Vitals and nursing note reviewed.   Constitutional:       Appearance: He is underweight. He is ill-appearing.      Interventions: He is intubated.   HENT:      Head:  Normocephalic.      Mouth/Throat:      Mouth: Mucous membranes are dry.   Eyes:      General: No scleral icterus.  Cardiovascular:      Rate and Rhythm: Normal rate.      Pulses: Normal pulses.   Pulmonary:      Effort: He is intubated.   Abdominal:      General: Abdomen is flat. There is no distension.      Palpations: Abdomen is soft.   Skin:     General: Skin is dry.      Coloration: Skin is not jaundiced.       Neurological Exam:   LOC: obtunded  Attention Span: poor  Language: Mute, Intubated  Articulation: Untestable due to intubation  Orientation: Untestable due to intubation  EOM (CN III, IV, VI): No apparent gaze deviation  Pupils (CN II, III): 2mm equal and reactive  Facial Sensation (CN V): Corneal reflex present Bilateral  Gag Reflex: present  Reflexes: 2+ in UE, 3+ LE, no clonus, negative escalante  Motor: Moving all extremities against resistance 4-/5  Tone: Normal tone throughout        Laboratory:  CMP:   Recent Labs   Lab 01/31/23  0845   CALCIUM 9.3   ALBUMIN 4.0   PROT 7.9      K 3.3*   CO2 28      BUN 7*   CREATININE 1.0   ALKPHOS 119   ALT 11   AST 21   BILITOT 0.8     CBC:   Recent Labs   Lab 01/31/23  0845   WBC 4.30   RBC 5.94   HGB 18.0   HCT 54.0      MCV 91   MCH 30.3   MCHC 33.3     Lipid Panel:   Recent Labs   Lab 01/31/23  0936   CHOL 176   LDLCALC 108.2   HDL 55   TRIG 64     Coagulation: No results for input(s): PT, INR, APTT in the last 168 hours.  Hgb A1C: No results for input(s): HGBA1C in the last 168 hours.  TSH:   Recent Labs   Lab 01/31/23  0845   TSH 2.132       Diagnostic Results:      Brain imaging:    MRI 1/31/2023 @ OSH  FINDINGS:  The craniocervical junction is intact.  The sellar and parasellar structures appear unremarkable.  The intracranial flow voids are within normal limits.     There is extensive diffusion restriction in the right temporal and parietal lobes along with diffusion restriction in the right subinsular cortex and right posterior frontal  lobe.  The left cerebral hemisphere and cerebellum demonstrates no diffusion weighted signal abnormality.     The ventricles and sulci are prominent, consistent cerebral volume loss.  There are T2/FLAIR signal hyperintensities within the periventricular and subcortical white matter.  There also T2/FLAIR signal hyperintensities within the regions of diffusion weighted signal abnormalities.  There is no evidence of intracranial hemorrhage.     The orbits and intraorbital contents are unremarkable.  The paranasal sinuses are unremarkable.  The mastoid air cells are clear.  The calvarium is intact.     Impression:     Extensive foci of diffusion restriction in the right MCA distribution as above, consistent acute right MCA territory infarction.  No evidence of hemorrhagic conversion.     Changes of chronic small vessel ischemic disease and cerebral volume loss.      Vessel Imaging:  CTA 1/31/2023 @ OSH    FINDINGS:  Intracranial Compartment:     Stable cerebral atrophy and chronic small vessel ischemic changes no extra-axial blood or fluid collections.     The brain parenchyma demonstrates acute right MCA territorial infarction.  Remote lacunar infarct is seen in the left caudate head.  No parenchymal mass, hemorrhage, edema, or major vascular distribution infarct.     Skull/Extracranial Contents (limited evaluation): No fracture. Mastoid air cells and paranasal sinuses are essentially clear.     Non-Vascular Structures of the Neck/Thoracic Inlet (limited evaluation): Bullous emphysematous lungs.  Biapical scarring or fibrosis     Aorta: Normal 3 vessel arch.     Extracranial carotid circulation: No hemodynamically significant stenosis, aneurysmal dilatation, or dissection.     Extracranial vertebral circulation: Left vertebral artery hypoplastic.  No hemodynamically significant stenosis, aneurysmal dilatation, or dissection.     Intracranial Arteries: Concern for small thrombus with stenosis involving the inferior  branch of the M2 segment of the right MCA.  There is some flow seen in the more distal MCA vessels.     Venous structures (limited evaluation): Normal.     Impression:     Acute right MCA distribution infarct.  Concern for small thrombus with stenosis and or diminished flow involving the inferior branch of the M2 segment of the right MCA.     Remote left caudate head lacunar infarct.     Bullous emphysematous changes.    Cardiac Evaluation:   Echo 1/31/2023    There is no evidence of intracardiac shunting.   The left ventricle is normal in size with concentric remodeling and normal systolic function.   The estimated ejection fraction is 65%.   Normal left ventricular diastolic function.   Normal right ventricular size with normal right ventricular systolic function.   Normal central venous pressure (3 mmHg).   The estimated PA systolic pressure is 7 mmHg.   There is no pulmonary hypertension.        Arslan Edgar MD  Comprehensive Stroke Center  Department of Vascular Neurology   [facility] - Neuro Critical Care

## 2023-02-01 NOTE — ASSESSMENT & PLAN NOTE
77 y.o. male, with R MCA infarct and encephalopathy as a transfer from [facility] [place]. Symptoms include left arm weakness and numbness that has been constant without aggravating alleviating factors for 2 weeks.     MRI with diffusion restriction in the right temporal and parietal lobes along with diffusion restriction in the right subinsular cortex and right posterior frontal lobe. CTA with concern for small thrombus with stenosis and or diminished flow involving the inferior branch of the M2 segment of the right MCA.    Antithrombotics for secondary stroke prevention: Antiplatelets: Aspirin: 81 mg daily and Clopidogrel: 75 mg daily when safe to start given size of stroke & hemicraniotomy watch    Statins for secondary stroke prevention and hyperlipidemia, if present:   Statins: Atorvastatin- 40 mg daily    Aggressive risk factor modification: HTN, HLD     Rehab efforts: The patient has been evaluated by a stroke team provider and the therapy needs have been fully considered based off the presenting complaints and exam findings. The following therapy evaluations are needed: PT evaluate and treat, OT evaluate and treat, SLP evaluate and treat    Diagnostics ordered/pending: None     VTE prophylaxis: Heparin 5000 units SQ every 8 hours    BP parameters: Infarct: No intervention, SBP <220

## 2023-02-01 NOTE — H&P
[facility] - Neuro Critical Care  Neurocritical Care  History & Physical    Admit Date: 1/31/2023  Service Date: 01/31/2023  Length of Stay: 0    Subjective:     Chief Complaint: Acute right MCA stroke    History of Present Illness:  Gabriel Springer 77 y.o. male with bph tobacco abuse and asthma presents to [facility] for R MCA infarct and encephalopathy. He presented to OSH l with a chief complaint of left arm weakness.  He reports 2 weeks of left arm weakness and numbness that has been constant without aggravating alleviating factors.  He attempted no treatment at home.  He finds he is unable to pick things up with his left hand.  He denies any slurred speech or difficulty swallowing and no numbness of his legs. He finds he has been having trouble walking at home with increasing stumbling.  He denies fever chest pain shortness of breath nausea vomiting abdominal pain leg swelling syncope dizziness dysuria melena hematuria hematemesis. While in ED OSH he had AMS and became hypoxic requiring intubation. There was concern for seizure he was given keppra 3 g and ativan 2mg. He is being admitted to [facility] for a higher level of care. History from chart due to LOC and intubation.       Past Medical History:   Diagnosis Date    Prostate atrophy      History reviewed. No pertinent surgical history.   No current facility-administered medications on file prior to encounter.     Current Outpatient Medications on File Prior to Encounter   Medication Sig Dispense Refill    albuterol (PROVENTIL/VENTOLIN HFA) 90 mcg/actuation inhaler Inhale 2 puffs into the lungs every 6 (six) hours as needed (cough and shortness of breath). Rescue 1 Inhaler 0    tamsulosin (FLOMAX) 0.4 mg Cap Take 0.4 mg by mouth once daily.      UNABLE TO FIND Take 2.5 mg by mouth nightly. medication name:  sleep aid (unknown name)      albuterol sulfate 2.5 mg/0.5 mL Nebu Take 2.5 mg by nebulization every 4 (four) hours as needed. Rescue 30 each 0    [DISCONTINUED]  desonide (DESOWEN) 0.05 % cream Apply topically 2 (two) times daily. 60 g 1    [DISCONTINUED] gabapentin (NEURONTIN) 300 MG capsule Take 1 capsule (300 mg total) by mouth 3 (three) times daily. 30 capsule 0      Allergies: Penicillins    History reviewed. No pertinent family history.  Social History     Tobacco Use    Smoking status: Some Days     Packs/day: 1.00     Types: Cigarettes   Substance Use Topics    Alcohol use: Yes     Alcohol/week: 1.0 standard drink     Types: 1 Cans of beer per week     Comment: 1/31/23: Denies    Drug use: No     Review of Systems    Unable to perform due to LOC and intubation     Objective:     Vitals:    Temp: 97.7 °F (36.5 °C)  Pulse: 84  Rhythm: normal sinus rhythm  BP: (!) 209/105  MAP (mmHg): 94  Resp: (!) 32  SpO2: 100 %  Oxygen Concentration (%): 100  Vent Mode: A/C  Set Rate: 18 BPM  Vt Set: 470 mL  PEEP/CPAP: 5 cmH20  Peak Airway Pressure: 29 cmH20  Mean Airway Pressure: 14 cmH20  Plateau Pressure: 0 cmH20    Temp  Min: 93 °F (33.9 °C)  Max: 97.8 °F (36.6 °C)  Pulse  Min: 61  Max: 112  BP  Min: 125/61  Max: 245/121  MAP (mmHg)  Min: 86  Max: 174  Resp  Min: 16  Max: 41  SpO2  Min: 96 %  Max: 100 %  Oxygen Concentration (%)  Min: 40  Max: 100    No intake/output data recorded.           Physical Exam      Physical Exam:  GA: Sedated intubated , comfortable, no acute distress.   HEENT: No scleral icterus or JVD.   Pulmonary: Coarse diminished bilaterally   Cardiac: RRR S1 & S2 w/o rubs/murmurs/gallops.   Abdominal: Bowel sounds present x 4.   Skin: No jaundice, rashes, or visible lesions.  Psych: --Mental Status:  intubated on vent does not follow responds to tactile stimuli with localization with edation paused   Neuro:  --GCS: E1 V1t M5  --Pupils 3mm, PERRL.   --Corneal reflex, gag, cough intact.  --moves all ext spont       Unable to test orientation, language, memory, judgment, insight, fund of knowledge, hearing, shoulder shrug, tongue protrusion, coordination, gait  due to level of consciousness.    Today I personally reviewed pertinent medications, lines/drains/airways, imaging, cardiology results, laboratory results, notably: MRi brain CTA head        Assessment/Plan:     Neuro  * Acute right MCA stroke  - Vn following   - No TNK and not a candidate for intervention per VN  - MRI brain Extensive foci of diffusion restriction in the right MCA distribution as above, consistent acute right MCA territory infarction.  No evidence of hemorrhagic conversion.  - CTA: Acute right MCA distribution infarct.  Concern for small thrombus with stenosis and or diminished flow involving the inferior branch of the M2 segment of the right MCA. Remote left caudate head lacunar infarct.  - SBP <2220  - Q 1 vitals   - Q 1 neuro checks   - TSH, A1c, lipid, echo and EKG  - Atorvastatin Daily   - Aspirin, SQ heparin   - Pt/OT/SLP eval and treat  - NPO   - EEG     Cytotoxic brain edema  - see stroke    Pulmonary  Asthma  - duo nebs Q 6     Cardiac/Vascular  HTN (hypertension)  - permissive htn  - SBP <220   - nicardipine as needed    - Echo 1/31: There is no evidence of intracardiac shunting.   The left ventricle is normal in size with concentric remodeling and normal systolic function.   The estimated ejection fraction is 65%.   Normal left ventricular diastolic function.   Normal right ventricular size with normal right ventricular systolic function.   Normal central venous pressure (3 mmHg).   The estimated PA systolic pressure is 7 mmHg.   There is no pulmonary hypertension.      Other  Impaired activities of daily living  - PT/Ot eval and treat when appropriate     Endotracheally intubated  - intubated OSH   - Mechanical vent   - VAP prophylaxis  - CXR and ABG daily and PRN         Tobacco abuse  - nicotine patch PRN          The patient is being Prophylaxed for:  Venous Thromboembolism with: Mechanical or Chemical  Stress Ulcer with: H2B  Ventilator Pneumonia with: chlorhexidine oral  care    Activity Orders          Turn patient starting at 02/01 0000    Elevate HOB starting at 01/31 2245    Diet NPO: NPO starting at 01/31 2245    Progressive Mobility Protocol (mobilize patient to their highest level of functioning at least twice daily) starting at 01/31 1800    Elevate HOB Elevate (30-45 degrees) starting at 01/31 1323    Straight Cath starting at 01/31 1322        Full Code    Critical condition in that Patient has a condition that poses threat to life and bodily function: Acute R MCA stroke, cytotoxic brain edema, HTN, intubated, impaired ADL, asthma      50  minutes of Critical care time was spent personally by me on the following activities: development of treatment plan with patient or surrogate and bedside caregivers, discussions with consultants, evaluation of patient's response to treatment, examination of patient, ordering and performing treatments and interventions, ordering and review of laboratory studies, ordering and review of radiographic studies, pulse oximetry, antibiotic titration if applicable, vasopressor titration if applicable, re-evaluation of patient's condition. This critical care time did not overlap with that of any other provider or involve time for any procedures. There is high probability for acute neurological change leading to clinical and possibly life-threatening deterioration requiring highest level of physician preparedness for urgent intervention.      Jerry Mckeon, NP  Neurocritical Care  [facility] Neuro Critical Care

## 2023-02-01 NOTE — ASSESSMENT & PLAN NOTE
- Possible TC seizure at OSH, loaded with Keppra 3gms  - EEG in place, read pending  - Keppra 500mg BID

## 2023-02-01 NOTE — PT/OT/SLP EVAL
Occupational Therapy   Evaluation    Name: Gabriel Springer  MRN: [id number]  Admitting Diagnosis: Acute right MCA stroke  Recent Surgery: * No surgery found *      Recommendations:     Discharge Recommendations:  (pending extubation)  Discharge Equipment Recommendations:   (pending extubation)  Barriers to discharge:  None    Assessment:     Gabriel Springer is a 77 y.o. male with a medical diagnosis of Acute right MCA stroke.  He presents with performance deficits affecting function: weakness, decreased ROM, impaired cognition, impaired endurance, impaired sensation, decreased coordination, impaired coordination, impaired fine motor, decreased upper extremity function, impaired self care skills, impaired functional mobility, decreased lower extremity function, decreased safety awareness, gait instability, impaired balance, impaired cardiopulmonary response to activity.      Rehab Prognosis: Good; patient would benefit from acute skilled OT services to address these deficits and reach maximum level of function.       Plan:     Patient to be seen 3 x/week to address the above listed problems via sensory integration, cognitive retraining, neuromuscular re-education, therapeutic exercises, therapeutic activities, self-care/home management  Plan of Care Expires: 03/01/23  Plan of Care Reviewed with: patient    Subjective     Patient:  Nonverbal; intubated    Occupational Profile:  Per chart review, patient resides in [place] alone; friend visits regularly.  Per chart review, patient with chief complaint of left arm weakness; 2 weeks of left arm weakness and numbness.  Patient with difficulty picking things up with his left hand and complaints of stumbling when walking.  Patient currently intubated and family unavailable for further social history.    Pain/Comfort:  Pain Rating 1: 0/10  Pain Rating Post-Intervention 1: 0/10    Patients cultural, spiritual, Jew conflicts given the current situation: no    Objective:      Communicated with: Nurse prior to session.  Patient found supine with bed alarm, ventilator, telemetry, restraints, peripheral IV, pulse ox (continuous), ramirez catheter, blood pressure cuff upon OT entry to room.    General Precautions: Standard, aspiration, fall  Orthopedic Precautions: N/A  Braces: N/A  Respiratory Status: Ventilator    Occupational Performance:    Bed Mobility:    Patient completed Rolling/Turning to Left with  maximal assistance  Patient completed Rolling/Turning to Right with maximal assistance    Functional Mobility/Transfers:  Dependent drawsheet transfers    Activities of Daily Living:  Feeding:  NPO    Grooming: total assistance while supine    Cognitive/Visual Perceptual:  Cognitive/Psychosocial Skills:     -       Oriented to: Daily orientation provided   -       Follows Commands/attention:not following commands during evaluation  -       Communication: nonverbal; intubated    Physical Exam:  Postural examination/scapula alignment:    -       Rounded shoulders  Edema:  None noted  Upper Extremity Range of Motion:  AAROM WNL    AMPAC 6 Click ADL:  AMPAC Total Score: 6    Treatment & Education:  Daily orientation provided.  AAROM performed bilateral UE/LEs one set x 10 rep in all planes of motion with stretches provided at end range.  Patient unable to follow commands during assessment.    Positioning provided for midline orientation with bilateral UEs elevated and heels lifted off mattress.   Family not present during the session.    Patient left supine with all lines intact, call button in reach, and bed alarm on    GOALS:   Multidisciplinary Problems       Occupational Therapy Goals          Problem: Occupational Therapy    Goal Priority Disciplines Outcome Interventions   Occupational Therapy Goal     OT, PT/OT Ongoing, Progressing    Description: Goals set 2/1 to be addressed for 14 days with expiration date, 2/15:  Patient will increase functional independence with ADLs by  performing:    Patient will demonstrate rolling to the right with min assist.  Not met   Patient will demonstrate rolling to the left with min assist.   Not met  Patient will demonstrate supine -sit with min assist.   Not met  Patient will demonstrate stand pivot transfers with min assist.   Not met  Patient will demonstrate grooming while standing with min assist.   Not met  Patient will demonstrate upper body dressing with min assist while seated EOB.   Not met  Patient will demonstrate lower body dressing with min assist while seated EOB.   Not met  Patient will demonstrate toileting with min assist.   Not met  Patient will demonstrate bathing while seated EOB with min assist.   Not met  Patient's family / caregiver will demonstrate independence and safety with assisting patient with self-care skills and functional mobility.     Not met  Patient's family / caregiver will demonstrate independence with providing ROM and changes in bed positioning.   Not met  Patient and/or patient's family will verbalize understanding of stroke prevention guidelines, personal risk factors and stroke warning signs via teachback method.  Not met                                  History:     Past Medical History:   Diagnosis Date    Prostate atrophy        History reviewed. No pertinent surgical history.    Time Tracking:     OT Date of Treatment: 02/01/23  OT Start Time: 0400  OT Stop Time: 0418  OT Total Time (min): 18 min    Billable Minutes:Evaluation 10  Neuromuscular Re-education 8    2/1/2023

## 2023-02-01 NOTE — PLAN OF CARE
[facility] Care Plan    POC reviewed with Gabriel Gian and family at 1400. Pt verbalized understanding. Questions and concerns addressed. No acute events today. Pt progressing toward goals. Will continue to monitor. See below and flowsheets for full assessment and VS info.     - extubated  - propofol gtt discontinued  - precedex gtt infusing  - fentanyl IVP given for agitation  - straight cath        Is this a stroke patient? yes- Stroke booklet reviewed with family, risk factors identified for patient and stroke booklet remains at bedside for ongoing education.     Neuro:  Daniel Coma Scale  Best Eye Response: 4-->(E4) spontaneous  Best Motor Response: 6-->(M6) obeys commands  Best Verbal Response: 5-->(V5) oriented  [place] Coma Scale Score: 15  Assessment Qualifiers: patient not sedated/intubated  Pupil PERRLA: yes     24 hr Temp:  [93 °F (33.9 °C)-98.6 °F (37 °C)]     CV:   Rhythm: normal sinus rhythm  BP goals:   SBP < 220  MAP > 65    Resp:      Vent Mode: Spont  Set Rate: 20 BPM  Oxygen Concentration (%): 40  Vt Set: 470 mL  PEEP/CPAP: 5 cmH20  Pressure Support: 5 cmH20    Plan: N/A and extubated    GI/:     Diet/Nutrition Received: NPO  Last Bowel Movement: 01/30/23  Voiding Characteristics: external catheter    Intake/Output Summary (Last 24 hours) at 2/1/2023 1541  Last data filed at 2/1/2023 1505  Gross per 24 hour   Intake 1389.27 ml   Output 1465 ml   Net -75.73 ml     Unmeasured Output  Stool Occurrence: 0    Labs/Accuchecks:  Recent Labs   Lab 02/01/23  0207   WBC 10.16   RBC 5.77   HGB 17.4   HCT 53.4         Recent Labs   Lab 02/01/23  0207   *   K 4.8   CO2 18*      BUN 9   CREATININE 1.0   ALKPHOS 115   ALT 14   AST 31   BILITOT 0.8    No results for input(s): PROTIME, INR, APTT, HEPANTIXA in the last 168 hours. No results for input(s): CPK, CPKMB, TROPONINI, MB in the last 168 hours.    Electrolytes: N/A - electrolytes WDL  Accuchecks: Q6H    Gtts:   sodium chloride 0.9% 50 mL/hr  at 02/01/23 1505    dexmedetomidine (PRECEDEX) infusion Stopped (02/01/23 1150)    sodium chloride 0.9%         LDA/Wounds:  Lines/Drains/Airways       Drain  Duration             Male External Urinary Catheter 01/31/23 2300 <1 day              Peripheral Intravenous Line  Duration                  Peripheral IV - Single Lumen 01/31/23 0845 20 G Anterior;Proximal;Right Forearm 1 day         Peripheral IV - Single Lumen 01/31/23 1935 18 G Right Upper Arm <1 day         Peripheral IV - Single Lumen 01/31/23 2032 18 G Left Forearm <1 day                  Wounds: No  Wound care consulted: No

## 2023-02-01 NOTE — HPI
Gabriel Springer 77 y.o. male with BPH, tobacco abuse, and asthma who presents to [facility] for R MCA infarct and encephalopathy. He presented to OSH with a chief complaint of left arm weakness.  He reports 2 weeks of left arm weakness and numbness that has been constant without aggravating alleviating factors.  He attempted no treatment at home.  He finds he is unable to pick things up with his left hand.  He denies any slurred speech or difficulty swallowing and no numbness of his legs. He finds he has been having trouble walking at home with increasing stumbling. While in ED OSH he had AMS and became hypoxic requiring intubation. There was concern for seizure. He was given keppra 3 gm and ativan 2mg. He is being admitted to [facility] for a higher level of care. History from chart due to LOC and intubation.

## 2023-02-01 NOTE — PLAN OF CARE
[facility] - Neuro Critical Care  Discharge Reassessment    Primary Care Provider: Scott Guerra Jr, MD    Expected Discharge Date: 2/7/2023    Patient transferred to [facility] from [facility].  Patient arrived intubated and was extubated today per MD.  Discharge Planning Assessment was completed at [facility].  Per assessment at [facility], Patient lives alone, but his friend Natalie visits him in a regular basis and she helps him to check on him. Patient's friend will provide transportation for him to get home when discharge from the hospital.  Awaiting post- extubation therapy recs and SLP recs.        Reassessment (most recent)       Discharge Reassessment - 02/01/23 1516          Discharge Reassessment    Assessment Type Discharge Planning Reassessment     Did the patient's condition or plan change since previous assessment? No     Communicated DWAIN with patient/caregiver Date not available/Unable to determine     Discharge Plan A Home Health     Discharge Plan B Rehab     DME Needed Upon Discharge  other (see comments)   tbd    Discharge Barriers Identified None     Why the patient remains in the hospital Requires continued medical care                   Sara Durán RN, CCRN-K, [facility]  Neuro-Critical Care   [phone]

## 2023-02-01 NOTE — HOSPITAL COURSE
02/01/2023: Patient extubated to nasal cannula. Tolerating well. Will consult SLP for swallowing eval. EEG in place, read pending.  02/02/2023: ERICON. Precedex stopped, seroquel ordered. Haldol ordered prn. Mech Soft diet started. Possible SD to stroke team today.  2/3/2023: KRYSTIAN, stepdown to stroke

## 2023-02-01 NOTE — PLAN OF CARE
Recommendations     1. When medically able, initate TF regimen of Impact Peptide @ 60 mL/hr- provides 2160 kcals, 135 g pro, and 1109 mL free water.   2. If able to tolerate PO intake, ADAT to regular- texture per SLP.   3. RD following.     Goals: Will meet % EEN/EPN by next RD f/u.  Nutrition Goal Status: new  Communication of RD Recs: reviewed with JEFFRY Ellis, Registration Eligible, Provisional LDN

## 2023-02-01 NOTE — ASSESSMENT & PLAN NOTE
--Intubated at OSH  --Extubated today after appropriate parameters and + cuff leak, tolerating NC

## 2023-02-01 NOTE — PLAN OF CARE
[facility] Emergency Dept  Initial Discharge Assessment       Primary Care Provider: Primary Doctor No    Admission Diagnosis: Chest pain [R07.9]    Admission Date: 1/31/2023  Expected Discharge Date:     SW completed initial assessment and discussed discharge planning with patient at his bedside. Patient lives alone, but his friend Natalie visits him in a regular basis and she helps him to check on him. Patient's friend will provide transportation for him to get home when discharge from the hospital.    Discharge Barriers Identified: None    Payor: [employer] MEDICARE / Plan: [employer] MEDICARE HMO / Product Type: Capitation /     Extended Emergency Contact Information  Primary Emergency Contact: Annie Springer   [place]  Mobile Phone: [phone]  Relation: Spouse  Secondary Emergency Contact: rafaela koenig  Mobile Phone: [phone]  Relation: Daughter  Preferred language: English   needed? No    Discharge Plan A: Home  Discharge Plan B: Home      [id number] - [place] - [address] [employer] [facility]  [address] [address]  Phone: [phone] Fax: [phone]      Initial Assessment (most recent)       Adult Discharge Assessment - 01/31/23 2132          Discharge Assessment    Assessment Type Discharge Planning Assessment     Confirmed/corrected address, phone number and insurance Yes     Confirmed Demographics Correct on Facesheet     Source of Information patient     When was your last doctors appointment? --   Patient stated that he does not recall    Communicated DWAIN with patient/caregiver Date not available/Unable to determine     Reason For Admission Chest pain     People in Home alone     Do you expect to return to your current living situation? No     Do you have help at home or someone to help you manage your care at home? No     Prior to hospitilization cognitive status: Alert/Oriented     Current cognitive status: Alert/Oriented      Equipment Currently Used at Home none     Readmission within 30 days? No     Patient currently being followed by outpatient case management? No     Do you currently have service(s) that help you manage your care at home? No     Do you take prescription medications? Yes     Do you have prescription coverage? Yes     Coverage Medicare     Do you have any problems affording any of your prescribed medications? No     Is the patient taking medications as prescribed? yes     Who is going to help you get home at discharge? Andreina Patricia ([facility]) [phone]     How do you get to doctors appointments? public transportation     Are you on dialysis? No     Do you take coumadin? No     Discharge Plan A Home     Discharge Plan B Home     DME Needed Upon Discharge  none     Discharge Plan discussed with: Patient     Discharge Barriers Identified None

## 2023-02-01 NOTE — SUBJECTIVE & OBJECTIVE
Past Medical History:   Diagnosis Date    Prostate atrophy      History reviewed. No pertinent surgical history.   No current facility-administered medications on file prior to encounter.     Current Outpatient Medications on File Prior to Encounter   Medication Sig Dispense Refill    albuterol (PROVENTIL/VENTOLIN HFA) 90 mcg/actuation inhaler Inhale 2 puffs into the lungs every 6 (six) hours as needed (cough and shortness of breath). Rescue 1 Inhaler 0    tamsulosin (FLOMAX) 0.4 mg Cap Take 0.4 mg by mouth once daily.      UNABLE TO FIND Take 2.5 mg by mouth nightly. medication name:  sleep aid (unknown name)      albuterol sulfate 2.5 mg/0.5 mL Nebu Take 2.5 mg by nebulization every 4 (four) hours as needed. Rescue 30 each 0    [DISCONTINUED] desonide (DESOWEN) 0.05 % cream Apply topically 2 (two) times daily. 60 g 1    [DISCONTINUED] gabapentin (NEURONTIN) 300 MG capsule Take 1 capsule (300 mg total) by mouth 3 (three) times daily. 30 capsule 0      Allergies: Penicillins    History reviewed. No pertinent family history.  Social History     Tobacco Use    Smoking status: Some Days     Packs/day: 1.00     Types: Cigarettes   Substance Use Topics    Alcohol use: Yes     Alcohol/week: 1.0 standard drink     Types: 1 Cans of beer per week     Comment: 1/31/23: Denies    Drug use: No     Review of Systems    Unable to perform due to LOC and intubation     Objective:     Vitals:    Temp: 97.7 °F (36.5 °C)  Pulse: 84  Rhythm: normal sinus rhythm  BP: (!) 209/105  MAP (mmHg): 94  Resp: (!) 32  SpO2: 100 %  Oxygen Concentration (%): 100  Vent Mode: A/C  Set Rate: 18 BPM  Vt Set: 470 mL  PEEP/CPAP: 5 cmH20  Peak Airway Pressure: 29 cmH20  Mean Airway Pressure: 14 cmH20  Plateau Pressure: 0 cmH20    Temp  Min: 93 °F (33.9 °C)  Max: 97.8 °F (36.6 °C)  Pulse  Min: 61  Max: 112  BP  Min: 125/61  Max: 245/121  MAP (mmHg)  Min: 86  Max: 174  Resp  Min: 16  Max: 41  SpO2  Min: 96 %  Max: 100 %  Oxygen Concentration (%)  Min: 40   Max: 100    No intake/output data recorded.           Physical Exam      Physical Exam:  GA: Sedated intubated , comfortable, no acute distress.   HEENT: No scleral icterus or JVD.   Pulmonary: Coarse diminished bilaterally   Cardiac: RRR S1 & S2 w/o rubs/murmurs/gallops.   Abdominal: Bowel sounds present x 4.   Skin: No jaundice, rashes, or visible lesions.  Psych: --Mental Status:  intubated on vent does not follow responds to tactile stimuli with localization with edation paused   Neuro:  --GCS: E1 V1t M5  --Pupils 3mm, PERRL.   --Corneal reflex, gag, cough intact.  --moves all ext spont       Unable to test orientation, language, memory, judgment, insight, fund of knowledge, hearing, shoulder shrug, tongue protrusion, coordination, gait due to level of consciousness.    Today I personally reviewed pertinent medications, lines/drains/airways, imaging, cardiology results, laboratory results, notably: MRi brain CTA head

## 2023-02-01 NOTE — PT/OT/SLP PROGRESS
Speech Language Pathology  Discharge    Gabriel Springer  MRN: [id number]    Patient not seen today secondary to pt intubated at this time. Please re-consult when medically appropriate. No further ST warranted at this time.   2/1/2023

## 2023-02-01 NOTE — NURSING
RAPID RESPONSE NURSE NOTE        Admit Date: 2023  LOS: 0  Code Status: Full Code   Date of Consult: 2023  : 1945  Age: 77 y.o.  Weight:   Wt Readings from Last 1 Encounters:   23 56.2 kg (124 lb)     Sex: male  Race: Black or    [facility]   MRN: [id number]  Time Rapid Response Team page Received:   Time Rapid Response Team at Bedside:   Time Rapid Response Team left Bedside:   Was the patient discharged from an ICU this admission? No   Was the patient discharged from a PACU within last 24 hours? No   Did the patient receive conscious sedation/general anesthesia in last 24 hours? No   Was the patient in the ED within the past 24 hours? Yes   Was the patient on NIPPV within the past 24 hours? No   Did this progress into an ARC or CPA:  Acute Respiratory Compromise  Attending Physician: Apolinar Richard MD  Primary Service: Internal Medicine,Hospitalist     SITUATION     Notified by overhead page in CT scan   Reason for alert: Patient reports unable to breath  Called to evaluate the patient for Respiratory. Patient in wheelchair stating unable to breath. Patient was taken to the ER room 17. Assisted to the stretcher and attached to the EKG, NIBP, O2 sats . 100 % NRB applied    Why is the patient in the hospital?: CVA (cerebral vascular accident)    Patient has a past medical history of Prostate atrophy.    Last Vitals:  Temp: 93 °F (33.9 °C) (2025)  Pulse: 81 (2043)  Resp: 16 (2043)  BP: 152/69 (2043)  SpO2: 100 % (2043)    24 Hours Vitals Range:  Temp:  [93 °F (33.9 °C)-97.8 °F (36.6 °C)]   Pulse:  []   Resp:  [16-41]   BP: (140-245)/()   SpO2:  [96 %-100 %]     Labs:  Recent Labs     23  0845   WBC 4.30   HGB 18.0   HCT 54.0          Recent Labs     23  0845 23  0936     --    K 3.3*  --      --    CO2 28  --    CREATININE 1.0  --    GLU 80  --    MG  --  2.2        No results for  input(s): PH, PCO2, PO2, HCO3, POCSATURATED, BE in the last 72 hours.     ASSESSMENT/INTERVENTIONS    The patient was seen for a Respiratory problem. Staff concerns included tachypnea, oxygen saturation < 90% despite supplemental oxygen, and hypercapnia. The following interventions were performed: continued pulse ox monitoring and supplemental oxygen.Patient  reporting unable to breathe.    RECOMMENDATIONS    We recommend:patient taking the patient to the ER room 17. Multiple MD's at bedside ,  stat intubation     Discussed plan of care with charge RN, Bedside nurse  and MD     PROVIDER ESCALATION    Orders received and case discussed with  NICOLE Moss NP.Dr Benitez and ER MDs at bedside     Disposition: Tx to ER bed room 17    FOLLOW UP  Call the Rapid Response NurseTrinidad  at x [phone] for additional questions or concerns.

## 2023-02-01 NOTE — PLAN OF CARE
OT evaluation completed.  Problem: Occupational Therapy  Goal: Occupational Therapy Goal  Description: Goals set 2/1 to be addressed for 14 days with expiration date, 2/15:  Patient will increase functional independence with ADLs by performing:    Patient will demonstrate rolling to the right with min assist.  Not met   Patient will demonstrate rolling to the left with min assist.   Not met  Patient will demonstrate supine -sit with min assist.   Not met  Patient will demonstrate stand pivot transfers with min assist.   Not met  Patient will demonstrate grooming while standing with min assist.   Not met  Patient will demonstrate upper body dressing with min assist while seated EOB.   Not met  Patient will demonstrate lower body dressing with min assist while seated EOB.   Not met  Patient will demonstrate toileting with min assist.   Not met  Patient will demonstrate bathing while seated EOB with min assist.   Not met  Patient's family / caregiver will demonstrate independence and safety with assisting patient with self-care skills and functional mobility.     Not met  Patient's family / caregiver will demonstrate independence with providing ROM and changes in bed positioning.   Not met  Patient and/or patient's family will verbalize understanding of stroke prevention guidelines, personal risk factors and stroke warning signs via teachback method.  Not met             Outcome: Ongoing, Progressing

## 2023-02-01 NOTE — RESPIRATORY THERAPY
Patient extubated per order.  Placed patient on 3L NC; sats are currently 97%.  Will continue to monitor.

## 2023-02-01 NOTE — PROCEDURES - EMERGENCY DEPT.
ED Procedure Notes:   Intubation    Date/Time: 1/31/2023 8:09 PM  Location procedure was performed: [facility] EMERGENCY DEPARTMENT  Performed by: Amari Palomo MD  Authorized by: Manjit Benitez MD   Consent Done: Emergent Situation  Indications: respiratory distress, airway protection and hypoxemia  Intubation method: direct  Patient status: paralyzed (RSI)  Preoxygenation: bag valve mask  Sedatives: etomidate  Paralytic: succinylcholine  Laryngoscope size: Mac 4  Tube size: 7.5 mm  Tube type: cuffed  Number of attempts: 2  Ventilation between attempts: BVM  Cricoid pressure: yes  Cords visualized: yes  Post-procedure assessment: chest rise and CO2 detector  Breath sounds: equal and absent over the epigastrium  Cuff inflated: yes  ETT to lip: 25 cm  Tube secured with: ETT dumont  Chest x-ray interpreted by me.  Chest x-ray findings: endotracheal tube in appropriate position  Patient tolerance: Patient tolerated the procedure well with no immediate complications  Complications: No  Specimens: No  Implants: No

## 2023-02-01 NOTE — HPI
Mr Gabriel Springer is a 77 y.o. male, here for R MCA infarct and encephalopathy as a [place] [facility] [place]. He has a PMHx of tobacco abuse and asthma. He presented to OSH l with a chief complaint of left arm weakness.  He reports 2 weeks of left arm weakness and numbness that has been constant without aggravating alleviating factors.  He attempted no treatment at home.  He finds he is unable to pick things up with his left hand.  He denies any slurred speech or difficulty swallowing and no numbness of his legs. He finds he has been having trouble walking at home with increasing stumbling.  He denies fever chest pain shortness of breath nausea vomiting abdominal pain leg swelling syncope dizziness dysuria melena hematuria hematemesis. While in ED OSH he had AMS and became hypoxic requiring intubation. There was concern for seizure he was given keppra 3 g and ativan 2mg. He is now admitted to [facility] for a higher level of care. History from chart due to LOC and intubation.

## 2023-02-01 NOTE — ASSESSMENT & PLAN NOTE
- VN following   - No TNK and not a candidate for intervention per VN  - MRI brain Extensive foci of diffusion restriction in the right MCA distribution as above, consistent acute right MCA territory infarction.  No evidence of hemorrhagic conversion.  - CTA: Acute right MCA distribution infarct.  Concern for small thrombus with stenosis and or diminished flow involving the inferior branch of the M2 segment of the right MCA. Remote left caudate head lacunar infarct.  - SBP Goal < 220  - Q 1 vitals   - Q 1 neuro checks   - TSH, A1c, lipid, echo and EKG  - Atorvastatin Daily   - Aspirin, SQ heparin   - PT/OT/SLP eval and treat  - NPO until passes bedside swallow and evaluated by SLP

## 2023-02-01 NOTE — PLAN OF CARE
Please refer to Arslan Edgar MD's consult note performed earlier in the day.     Patient previously intubated and sedated. This afternoon patient was extubated and off sedation with propofol. Patient was able to become alert with verbal stimuli. He was able to follow commands and moves all extremities. L side notably weaker than right. Patient is on precedex for agitation. Exam, however, was limited to poor patient participation and agitation. He is on ASA, atorvastatin 40mg and VTE heparin for secondary stroke prevention. Will continue to follow. Please contact Vascular neurology regarding any additional questions, concerns, or significant clinical changes from patient's current state of health.       Humberto Beaver PA-C  Vascular Neurology   [phone]

## 2023-02-01 NOTE — PLAN OF CARE
[facility] Care Plan    POC reviewed with Gabriel Springer and family at 0300. Pt verbalized understanding. Questions and concerns addressed. No acute events overnight. Pt progressing toward goals. Will continue to monitor. See below and flowsheets for full assessment and VS info.   -sbp's maintained <220 w/o interventions   -propofol infusing @ 40   -NS infusing @ 50ml/hr     Is this a stroke patient? yes- Stroke booklet reviewed with patient and family, risk factors identified for patient and stroke booklet remains at bedside for ongoing education.     Neuro:  [place] Coma Scale  Best Eye Response: 1-->(E1) none  Best Motor Response: 5-->(M5) localizes pain  Best Verbal Response: 1-->(V1) none  Daniel Coma Scale Score: 7  Assessment Qualifiers: patient chemically sedated or paralyzed, patient intubated, no eye obstruction present  Pupil PERRLA: yes     24hr Temp:  [93 °F (33.9 °C)-97.8 °F (36.6 °C)]     CV:   Rhythm: sinus bradycardia  BP goals:   SBP < 220  MAP > 65    Resp:      Vent Mode: A/C  Set Rate: 18 BPM  Oxygen Concentration (%): 50  Vt Set: 470 mL  PEEP/CPAP: 5 cmH20    Plan: wean to extubate    GI/:     Diet/Nutrition Received: NPO  Last Bowel Movement: 01/30/23  Voiding Characteristics: external catheter    Intake/Output Summary (Last 24 hours) at 2/1/2023 0553  Last data filed at 2/1/2023 0137  Gross per 24 hour   Intake 703.03 ml   Output --   Net 703.03 ml     Unmeasured Output  Stool Occurrence: 0    Labs/Accuchecks:  Recent Labs   Lab 02/01/23  0207   WBC 10.16   RBC 5.77   HGB 17.4   HCT 53.4         Recent Labs   Lab 02/01/23  0207   *   K 4.8   CO2 18*      BUN 9   CREATININE 1.0   ALKPHOS 115   ALT 14   AST 31   BILITOT 0.8    No results for input(s): PROTIME, INR, APTT, HEPANTIXA in the last 168 hours. No results for input(s): CPK, CPKMB, TROPONINI, MB in the last 168 hours.    Electrolytes: N/A - electrolytes WDL  Accuchecks: Q6H    Gtts:   sodium chloride 0.9% 50 mL/hr at  02/01/23 0137    niCARdipine      propofoL 50 mcg/kg/min (02/01/23 0137)    sodium chloride 0.9%         LDA/Wounds:  Lines/Drains/Airways       Drain  Duration                  NG/OG Tube 01/31/23 2000 orogastric 16 Fr. Right mouth <1 day         Urethral Catheter 01/31/23 1945 16 Fr. <1 day              Airway  Duration                  Airway - Non-Surgical 01/31/23 1945 Endotracheal Tube <1 day              Peripheral Intravenous Line  Duration                  Peripheral IV - Single Lumen 01/31/23 0845 20 G Anterior;Proximal;Right Forearm <1 day         Peripheral IV - Single Lumen 01/31/23 1935 18 G Right Upper Arm <1 day         Peripheral IV - Single Lumen 01/31/23 2032 18 G Left Forearm <1 day                  Wounds: No  Wound care consulted: No     Problem: Adult Inpatient Plan of Care  Goal: Plan of Care Review  Flowsheets (Taken 2/1/2023 0404)  Plan of Care Reviewed With: patient     Problem: Cerebral Tissue Perfusion (Stroke, Ischemic/Transient Ischemic Attack)  Goal: Optimal Cerebral Tissue Perfusion  Intervention: Protect and Optimize Cerebral Perfusion  Flowsheets (Taken 2/1/2023 0404)  Sensory Stimulation Regulation:   care clustered   lighting decreased   quiet environment promoted   visual stimulation minimized  Cerebral Perfusion Promotion: blood pressure monitored  Fluid/Electrolyte Management: fluids provided  Stabilization Measures: airway opened     Problem: Cognitive Impairment (Stroke, Ischemic/Transient Ischemic Attack)  Goal: Optimal Cognitive Function  Intervention: Optimize Cognitive Function  Flowsheets (Taken 2/1/2023 0404)  Environment Familiarity/Consistency: daily routine followed  Reorientation Measures:   calendar in view   clock in view  Sensory Stimulation Regulation:   care clustered   lighting decreased   quiet environment promoted   visual stimulation minimized

## 2023-02-01 NOTE — ASSESSMENT & PLAN NOTE
- Vn following   - No TNK and not a candidate for intervention per VN  - MRI brain Extensive foci of diffusion restriction in the right MCA distribution as above, consistent acute right MCA territory infarction.  No evidence of hemorrhagic conversion.  - CTA: Acute right MCA distribution infarct.  Concern for small thrombus with stenosis and or diminished flow involving the inferior branch of the M2 segment of the right MCA. Remote left caudate head lacunar infarct.  - SBP <2220  - Q 1 vitals   - Q 1 neuro checks   - TSH, A1c, lipid, echo and EKG  - Atorvastatin Daily   - Aspirin, SQ heparin   - Pt/OT/SLP eval and treat  - NPO   - EEG

## 2023-02-01 NOTE — ED NOTES
Nelda at bedside to transfer patient to [facility]. Report called and given to Rox. Report given to Ana Maria paramedic. Andreina, patients daughter was called from number in chart and given updated and verbalizes POC.

## 2023-02-02 PROBLEM — R31.9 URINARY TRACT INFECTION WITH HEMATURIA: Status: ACTIVE | Noted: 2023-02-02

## 2023-02-02 PROBLEM — Z97.8 ENDOTRACHEALLY INTUBATED: Status: RESOLVED | Noted: 2023-01-31 | Resolved: 2023-02-02

## 2023-02-02 PROBLEM — N39.0 URINARY TRACT INFECTION WITH HEMATURIA: Status: ACTIVE | Noted: 2023-02-02

## 2023-02-02 LAB
ALBUMIN SERPL BCP-MCNC: 3.2 G/DL (ref 3.5–5.2)
ALP SERPL-CCNC: 95 U/L (ref 55–135)
ALT SERPL W/O P-5'-P-CCNC: 12 U/L (ref 10–44)
ANION GAP SERPL CALC-SCNC: 10 MMOL/L (ref 8–16)
AST SERPL-CCNC: 23 U/L (ref 10–40)
BACTERIA UR CULT: NO GROWTH
BASOPHILS # BLD AUTO: 0.03 K/UL (ref 0–0.2)
BASOPHILS NFR BLD: 0.3 % (ref 0–1.9)
BILIRUB SERPL-MCNC: 0.6 MG/DL (ref 0.1–1)
BUN SERPL-MCNC: 11 MG/DL (ref 8–23)
CALCIUM SERPL-MCNC: 9.1 MG/DL (ref 8.7–10.5)
CHLORIDE SERPL-SCNC: 106 MMOL/L (ref 95–110)
CO2 SERPL-SCNC: 23 MMOL/L (ref 23–29)
CREAT SERPL-MCNC: 0.8 MG/DL (ref 0.5–1.4)
DIFFERENTIAL METHOD: ABNORMAL
EOSINOPHIL # BLD AUTO: 0 K/UL (ref 0–0.5)
EOSINOPHIL NFR BLD: 0.2 % (ref 0–8)
ERYTHROCYTE [DISTWIDTH] IN BLOOD BY AUTOMATED COUNT: 15.3 % (ref 11.5–14.5)
EST. GFR  (NO RACE VARIABLE): >60 ML/MIN/1.73 M^2
GLUCOSE SERPL-MCNC: 97 MG/DL (ref 70–110)
HCT VFR BLD AUTO: 48.3 % (ref 40–54)
HGB BLD-MCNC: 15.9 G/DL (ref 14–18)
IMM GRANULOCYTES # BLD AUTO: 0.02 K/UL (ref 0–0.04)
IMM GRANULOCYTES NFR BLD AUTO: 0.2 % (ref 0–0.5)
LYMPHOCYTES # BLD AUTO: 1.6 K/UL (ref 1–4.8)
LYMPHOCYTES NFR BLD: 16 % (ref 18–48)
MAGNESIUM SERPL-MCNC: 2.1 MG/DL (ref 1.6–2.6)
MCH RBC QN AUTO: 30.1 PG (ref 27–31)
MCHC RBC AUTO-ENTMCNC: 32.9 G/DL (ref 32–36)
MCV RBC AUTO: 91 FL (ref 82–98)
MONOCYTES # BLD AUTO: 0.9 K/UL (ref 0.3–1)
MONOCYTES NFR BLD: 9 % (ref 4–15)
NEUTROPHILS # BLD AUTO: 7.2 K/UL (ref 1.8–7.7)
NEUTROPHILS NFR BLD: 74.3 % (ref 38–73)
NRBC BLD-RTO: 0 /100 WBC
PHOSPHATE SERPL-MCNC: 3.7 MG/DL (ref 2.7–4.5)
PLATELET # BLD AUTO: 185 K/UL (ref 150–450)
PMV BLD AUTO: 10.5 FL (ref 9.2–12.9)
POCT GLUCOSE: 82 MG/DL (ref 70–110)
POCT GLUCOSE: 88 MG/DL (ref 70–110)
POTASSIUM SERPL-SCNC: 4 MMOL/L (ref 3.5–5.1)
PROT SERPL-MCNC: 6.2 G/DL (ref 6–8.4)
RBC # BLD AUTO: 5.29 M/UL (ref 4.6–6.2)
SODIUM SERPL-SCNC: 139 MMOL/L (ref 136–145)
WBC # BLD AUTO: 9.67 K/UL (ref 3.9–12.7)

## 2023-02-02 PROCEDURE — 20600001 HC STEP DOWN PRIVATE ROOM

## 2023-02-02 PROCEDURE — 25000003 PHARM REV CODE 250: Performed by: NURSE PRACTITIONER

## 2023-02-02 PROCEDURE — 99233 SBSQ HOSP IP/OBS HIGH 50: CPT | Mod: ,,, | Performed by: PSYCHIATRY & NEUROLOGY

## 2023-02-02 PROCEDURE — 80053 COMPREHEN METABOLIC PANEL: CPT | Performed by: NURSE PRACTITIONER

## 2023-02-02 PROCEDURE — 92610 EVALUATE SWALLOWING FUNCTION: CPT

## 2023-02-02 PROCEDURE — 97112 NEUROMUSCULAR REEDUCATION: CPT

## 2023-02-02 PROCEDURE — 27000221 HC OXYGEN, UP TO 24 HOURS

## 2023-02-02 PROCEDURE — 99233 SBSQ HOSP IP/OBS HIGH 50: CPT | Mod: GC,,, | Performed by: PSYCHIATRY & NEUROLOGY

## 2023-02-02 PROCEDURE — 92523 SPEECH SOUND LANG COMPREHEN: CPT

## 2023-02-02 PROCEDURE — 94640 AIRWAY INHALATION TREATMENT: CPT

## 2023-02-02 PROCEDURE — 97535 SELF CARE MNGMENT TRAINING: CPT

## 2023-02-02 PROCEDURE — 84100 ASSAY OF PHOSPHORUS: CPT | Performed by: NURSE PRACTITIONER

## 2023-02-02 PROCEDURE — 25000003 PHARM REV CODE 250: Performed by: PSYCHIATRY & NEUROLOGY

## 2023-02-02 PROCEDURE — 94761 N-INVAS EAR/PLS OXIMETRY MLT: CPT

## 2023-02-02 PROCEDURE — 63600175 PHARM REV CODE 636 W HCPCS: Performed by: NURSE PRACTITIONER

## 2023-02-02 PROCEDURE — 99233 PR SUBSEQUENT HOSPITAL CARE,LEVL III: ICD-10-PCS | Mod: ,,, | Performed by: PSYCHIATRY & NEUROLOGY

## 2023-02-02 PROCEDURE — 83735 ASSAY OF MAGNESIUM: CPT | Performed by: NURSE PRACTITIONER

## 2023-02-02 PROCEDURE — S4991 NICOTINE PATCH NONLEGEND: HCPCS | Performed by: NURSE PRACTITIONER

## 2023-02-02 PROCEDURE — 85025 COMPLETE CBC W/AUTO DIFF WBC: CPT | Performed by: NURSE PRACTITIONER

## 2023-02-02 PROCEDURE — 25000242 PHARM REV CODE 250 ALT 637 W/ HCPCS: Performed by: NURSE PRACTITIONER

## 2023-02-02 PROCEDURE — 99233 PR SUBSEQUENT HOSPITAL CARE,LEVL III: ICD-10-PCS | Mod: GC,,, | Performed by: PSYCHIATRY & NEUROLOGY

## 2023-02-02 PROCEDURE — 97162 PT EVAL MOD COMPLEX 30 MIN: CPT

## 2023-02-02 RX ORDER — QUETIAPINE FUMARATE 25 MG/1
25 TABLET, FILM COATED ORAL 2 TIMES DAILY
Status: DISCONTINUED | OUTPATIENT
Start: 2023-02-02 | End: 2023-02-06 | Stop reason: HOSPADM

## 2023-02-02 RX ORDER — MUPIROCIN 20 MG/G
OINTMENT TOPICAL 2 TIMES DAILY
Status: DISCONTINUED | OUTPATIENT
Start: 2023-02-02 | End: 2023-02-06 | Stop reason: HOSPADM

## 2023-02-02 RX ORDER — IBUPROFEN 200 MG
1 TABLET ORAL DAILY
Status: DISCONTINUED | OUTPATIENT
Start: 2023-02-02 | End: 2023-02-06 | Stop reason: HOSPADM

## 2023-02-02 RX ORDER — HALOPERIDOL 5 MG/ML
5 INJECTION INTRAMUSCULAR EVERY 6 HOURS PRN
Status: DISCONTINUED | OUTPATIENT
Start: 2023-02-02 | End: 2023-02-06 | Stop reason: HOSPADM

## 2023-02-02 RX ADMIN — QUETIAPINE FUMARATE 25 MG: 25 TABLET ORAL at 09:02

## 2023-02-02 RX ADMIN — QUETIAPINE FUMARATE 25 MG: 25 TABLET ORAL at 12:02

## 2023-02-02 RX ADMIN — MUPIROCIN: 20 OINTMENT TOPICAL at 11:02

## 2023-02-02 RX ADMIN — IPRATROPIUM BROMIDE AND ALBUTEROL SULFATE 3 ML: .5; 3 SOLUTION RESPIRATORY (INHALATION) at 07:02

## 2023-02-02 RX ADMIN — SODIUM CHLORIDE: 9 INJECTION, SOLUTION INTRAVENOUS at 06:02

## 2023-02-02 RX ADMIN — LEVETIRACETAM 500 MG: 100 INJECTION, SOLUTION INTRAVENOUS at 09:02

## 2023-02-02 RX ADMIN — ATORVASTATIN CALCIUM 40 MG: 40 TABLET, FILM COATED ORAL at 09:02

## 2023-02-02 RX ADMIN — IPRATROPIUM BROMIDE AND ALBUTEROL SULFATE 3 ML: .5; 3 SOLUTION RESPIRATORY (INHALATION) at 01:02

## 2023-02-02 RX ADMIN — NICOTINE 1 PATCH: 14 PATCH, EXTENDED RELEASE TRANSDERMAL at 12:02

## 2023-02-02 RX ADMIN — IPRATROPIUM BROMIDE AND ALBUTEROL SULFATE 3 ML: .5; 3 SOLUTION RESPIRATORY (INHALATION) at 08:02

## 2023-02-02 RX ADMIN — MUPIROCIN: 20 OINTMENT TOPICAL at 09:02

## 2023-02-02 RX ADMIN — IPRATROPIUM BROMIDE AND ALBUTEROL SULFATE 3 ML: .5; 3 SOLUTION RESPIRATORY (INHALATION) at 12:02

## 2023-02-02 RX ADMIN — HALOPERIDOL LACTATE 5 MG: 5 INJECTION, SOLUTION INTRAMUSCULAR at 04:02

## 2023-02-02 RX ADMIN — HEPARIN SODIUM 5000 UNITS: 5000 INJECTION INTRAVENOUS; SUBCUTANEOUS at 09:02

## 2023-02-02 RX ADMIN — HALOPERIDOL LACTATE 5 MG: 5 INJECTION, SOLUTION INTRAMUSCULAR at 10:02

## 2023-02-02 RX ADMIN — ASPIRIN 81 MG: 81 TABLET, CHEWABLE ORAL at 09:02

## 2023-02-02 RX ADMIN — HEPARIN SODIUM 5000 UNITS: 5000 INJECTION INTRAVENOUS; SUBCUTANEOUS at 05:02

## 2023-02-02 NOTE — PLAN OF CARE
Problem: SLP  Goal: SLP Goal  Description: Speech Language Pathology Goals  Goals expected to be met by 2/9:  1. Pt will participate in ongoing assessment of swallow function to determine safest and least restrictive diet.   2. Pt will participate in visuospatial tasks targeting L intattention with 75% acc given mod cues.  3. Pt will orient to place/time concepts with 75% acc given min-mod cues.  4. Pt will participate in mod level problem solving/reasoning tasks with 75% acc given mod cues.  5. Pt will participate in ongoing assessment of reading/writing skills to determine future therapeutic plan of care.   Outcome: Ongoing, Progressing  SLP rec: cautious initiation of mechanical soft diet with thin liquids (no straws) and meds crushed in puree. Pt requires supervision with po intake. Continue ST per POC.  2/2/2023

## 2023-02-02 NOTE — PROGRESS NOTES
[facility] Critical [facility]  Vascular Neurology  [facility]  Progress Note    Assessment/Plan:     * Acute right MCA stroke  77 y.o. male, with R MCA infarct and encephalopathy as a transfer from [facility] [place]. Symptoms include left arm weakness and numbness that has been constant without aggravating alleviating factors for 2 weeks.     -Extubated yesterday (2/1).  Tolerating well.  Off Precedex since 0900 today, redirectable.  Therapy recommending IPR.  Awaiting stepdown to NPU.    Antithrombotics for secondary stroke prevention: Antiplatelets: Aspirin: 81 mg daily and Clopidogrel: 75 mg daily when safe to start given size of stroke & hemicraniotomy watch    Statins for secondary stroke prevention and hyperlipidemia, if present:   Statins: Atorvastatin- 40 mg daily    Aggressive risk factor modification: HTN, HLD     Rehab efforts: The patient has been evaluated by a stroke team provider and the therapy needs have been fully considered based off the presenting complaints and exam findings. The following therapy evaluations are needed: PT evaluate and treat, OT evaluate and treat, SLP evaluate and treat    Diagnostics ordered/pending: None     VTE prophylaxis: Heparin 5000 units SQ every 8 hours    BP parameters: Infarct: No intervention, SBP <220        HTN (hypertension)  Stroke risk factor  Patient hypertensive as high as 245/121 on admission.  Was on cardene, stopped on 2/1 at 0843.  24° SBPs 136-193  He is not noted to be on any BP meds at home.    Cytotoxic brain edema  See stroke    Tobacco abuse  Stroke risk factor  - on smoking cessation once appropriate       2/2/2023 Extubated yesterday (2/1).  Tolerating well.  Off Precedex since 0900 today, redirectable.  Therapy recommending IPR.  Awaiting stepdown to NPU.      STROKE DOCUMENTATION   Acute Stroke Times   Stroke Team Called Date: 01/31/23  Stroke Team Called Time: 2230  Stroke Team Arrival Date: 01/31/23  Stroke Team Arrival Time:  2235  CT Interpretation Time: 2225 (done at OSH)  Thrombolytic Therapy Recommended: No  CTA Interpretation Time: 2225 (done at OSH)  Thrombectomy Recommended: No  MRI Acute Stroke Protocol Interpretation Time: 2225 (done at OSH)    NIH Scale:  1a. Level of Consciousness: 0-->Alert, keenly responsive  1b. LOC Questions: 0-->Answers both questions correctly  1c. LOC Commands: 0-->Performs both tasks correctly  2. Best Gaze: 1-->Partial gaze palsy, gaze is abnormal in one or both eyes, but forced deviation or total gaze paresis is not present  3. Visual: 1-->Partial hemianopia  4. Facial Palsy: 0-->Normal symmetrical movements  5a. Motor Arm, Left: 1-->Drift, limb holds 90 (or 45) degrees, but drifts down before full 10 seconds, does not hit bed or other support  5b. Motor Arm, Right: 0-->No drift, limb holds 90 (or 45) degrees for full 10 secs  6a. Motor Leg, Left: 0-->No drift, leg holds 30 degree position for full 5 secs  6b. Motor Leg, Right: 1-->Drift, leg falls by the end of the 5-sec period but does not hit bed  7. Limb Ataxia: 0-->Absent  8. Sensory: 2-->Severe to total sensory loss, patient is not aware of being touched in the face, arm, and leg  9. Best Language: 0-->No aphasia, normal  10. Dysarthria: 1-->Mild-to-moderate dysarthria, patient slurs at least some words and, at worst, can be understood with some difficulty  11. Extinction and Inattention (formerly Neglect): 0-->No abnormality  Total (NIH Stroke Scale): 7       Modified Gardners Score: 1  [place] Coma Scale:15   ABCD2 Score:    [id number] Score:   HAS -BLED Score:   ICH Score:   Hunt & Sierra Classification:      Hemorrhagic change of an Ischemic Stroke: Does this patient have an ischemic stroke with hemorrhagic changes? No     Neurologic Chief Complaint: R MCA stroke    Subjective:     Interval History: Patient is seen for follow-up neurological assessment and treatment recommendations: Extubated yesterday (2/1).  Tolerating well.  Off Precedex  since 0900 today, redirectable.  Therapy recommending IPR.  Awaiting stepdown to NPU.    HPI, Past Medical, Family, and Social History remains the same as documented in the initial encounter.     Review of Systems   Constitutional:  Negative for chills and fever.   HENT:  Negative for drooling and rhinorrhea.    Eyes:  Positive for visual disturbance. Negative for discharge.   Respiratory:  Negative for cough.    Cardiovascular:  Negative for chest pain and leg swelling.   Gastrointestinal:  Negative for abdominal pain, diarrhea, nausea and vomiting.   Genitourinary:  Positive for hematuria. Negative for urgency.   Musculoskeletal:  Negative for neck pain and neck stiffness.   Skin:  Negative for rash.   Allergic/Immunologic: Negative for environmental allergies and food allergies.   Neurological:  Positive for weakness and numbness.   Psychiatric/Behavioral:  Positive for agitation (was on Precedex, currently off gtt and redirectable).    Scheduled Meds:   albuterol-ipratropium  3 mL Nebulization Q6H    aspirin  81 mg Oral Daily    atorvastatin  40 mg Oral Daily    heparin (porcine)  5,000 Units Subcutaneous Q8H    levetiracetam IV  500 mg Intravenous Q12H    mupirocin   Nasal BID    nicotine  1 patch Transdermal Daily    QUEtiapine  25 mg Oral BID    senna-docusate 8.6-50 mg  1 tablet Oral BID    silodosin  4 mg Oral Daily     Continuous Infusions:   sodium chloride 0.9% 50 mL/hr at 02/02/23 1505    sodium chloride 0.9%       PRN Meds:acetaminophen, haloperidol lactate, ondansetron, sodium chloride 0.9%, sodium chloride 0.9%, sodium chloride 0.9%    Objective:     Vital Signs (Most Recent):  Temp: 97.8 °F (36.6 °C) (02/02/23 0705)  Pulse: 71 (02/02/23 1505)  Resp: (!) 24 (02/02/23 1505)  BP: (!) 170/74 (02/02/23 1505)  SpO2: 100 % (02/02/23 1505)  BP Location: Left arm    Vital Signs Range (Last 24H):  Temp:  [97.6 °F (36.4 °C)-98.3 °F (36.8 °C)]   Pulse:  [52-91]   Resp:  [16-43]   BP:  (136-193)/()   SpO2:  [93 %-100 %]   BP Location: Left arm    Physical Exam  Vitals and nursing note reviewed.   Constitutional:       General: He is not in acute distress.     Appearance: He is well-developed. He is not diaphoretic.   HENT:      Head: Normocephalic and atraumatic.      Right Ear: External ear normal.      Left Ear: External ear normal.      Nose: Nose normal.      Mouth/Throat:      Mouth: Mucous membranes are moist.   Eyes:      General: No scleral icterus.        Right eye: No discharge.         Left eye: No discharge.      Extraocular Movements:      Right eye: Abnormal extraocular motion present.      Left eye: Abnormal extraocular motion present.   Cardiovascular:      Rate and Rhythm: Normal rate and regular rhythm.      Heart sounds: Normal heart sounds.   Pulmonary:      Effort: Pulmonary effort is normal. No respiratory distress.   Abdominal:      General: There is no distension.      Palpations: Abdomen is soft.      Tenderness: There is no abdominal tenderness.   Musculoskeletal:      Cervical back: Normal range of motion and neck supple.   Skin:     General: Skin is warm and dry.      Capillary Refill: Capillary refill takes less than 2 seconds.   Neurological:      Mental Status: He is alert and oriented to person, place, and time.      Cranial Nerves: Cranial nerve deficit present.      Sensory: Sensory deficit present.      Motor: Weakness present.   Psychiatric:         Attention and Perception: He is inattentive.         Mood and Affect: Mood is anxious. Affect is blunt.         Behavior: Behavior is cooperative.       Neurological Exam:   LOC: alert  Attention Span: Good   Language: mixed aphasia  Articulation: No dysarthria  Orientation: Person, Place, Time   Visual Fields: Hemianopsia left  EOM (CN III, IV, VI): Gaze preference  right  Facial Movement (CN VII): Symmetric facial expression    Motor: Arm left  Paresis: 1/5  Leg left  Paresis: 1/5  Arm right  Normal 5/5  Leg  right Normal 5/5  Sensation: Talat-anesthesia left    Laboratory:  CMP:   Recent Labs   Lab 02/02/23 0023   CALCIUM 9.1   ALBUMIN 3.2*   PROT 6.2      K 4.0   CO2 23      BUN 11   CREATININE 0.8   ALKPHOS 95   ALT 12   AST 23   BILITOT 0.6     CBC:   Recent Labs   Lab 02/02/23 0023   WBC 9.67   RBC 5.29   HGB 15.9   HCT 48.3      MCV 91   MCH 30.1   MCHC 32.9     Lipid Panel:   Recent Labs   Lab 01/31/23  0936   CHOL 176   LDLCALC 108.2   HDL 55   TRIG 64     Coagulation: No results for input(s): PT, INR, APTT in the last 168 hours.  Hgb A1C:   Recent Labs   Lab 01/31/23 0845   HGBA1C 4.8     TSH:   Recent Labs   Lab 01/31/23 0845   TSH 2.132       Diagnostic Results   Brain imaging:     MRI 1/31/2023 @ OSH  Impression:  Extensive foci of diffusion restriction in the right MCA distribution as above, consistent acute right MCA territory infarction.  No evidence of hemorrhagic conversion.  Changes of chronic small vessel ischemic disease and cerebral volume loss.        Vessel Imaging:  CTA 1/31/2023 @ OSH   Impression:  Acute right MCA distribution infarct.  Concern for small thrombus with stenosis and or diminished flow involving the inferior branch of the M2 segment of the right MCA.  Remote left caudate head lacunar infarct.  Bullous emphysematous changes.     Cardiac Evaluation:   Echo 1/31/2023    There is no evidence of intracardiac shunting.   The left ventricle is normal in size with concentric remodeling and normal systolic function.   The estimated ejection fraction is 65%.   Normal left ventricular diastolic function.   Normal right ventricular size with normal right ventricular systolic function.   Normal central venous pressure (3 mmHg).   The estimated PA systolic pressure is 7 mmHg.   There is no pulmonary hypertension.      Adamaris Weiss, JACQUELINE, NP  [facility]  Department of Vascular Neurology   [facility]

## 2023-02-02 NOTE — PLAN OF CARE
[facility] Care Plan    POC reviewed with Gabriel Springer and family at 0300. Pt verbalized understanding. Questions and concerns addressed. No acute events overnight. Pt progressing toward goals. Will continue to monitor. See below and flowsheets for full assessment and VS info.       -precedex titrated   -NS @ 50    Is this a stroke patient? yes- Stroke booklet reviewed with patient, risk factors identified for patient and stroke booklet remains at bedside for ongoing education.     Neuro:  Elizabeth Coma Scale  Best Eye Response: 4-->(E4) spontaneous  Best Motor Response: 6-->(M6) obeys commands  Best Verbal Response: 5-->(V5) oriented  Daniel Coma Scale Score: 15  Assessment Qualifiers: patient not sedated/intubated  Pupil PERRLA: yes     24hr Temp:  [97.1 °F (36.2 °C)-98.6 °F (37 °C)]     CV:   Rhythm: normal sinus rhythm  BP goals:   SBP < 220  MAP > 65    Resp:      Vent Mode: Spont  Set Rate: 20 BPM  Oxygen Concentration (%): 40  Vt Set: 470 mL  PEEP/CPAP: 5 cmH20  Pressure Support: 5 cmH20    Plan: N/A    GI/:     Diet/Nutrition Received: NPO  Last Bowel Movement: 01/30/23  Voiding Characteristics: external catheter    Intake/Output Summary (Last 24 hours) at 2/2/2023 0311  Last data filed at 2/2/2023 0305  Gross per 24 hour   Intake 1428.89 ml   Output 2215 ml   Net -786.11 ml     Unmeasured Output  Urine Occurrence: 1  Stool Occurrence: 0  Pad Count: 2    Labs/Accuchecks:  Recent Labs   Lab 02/02/23  0023   WBC 9.67   RBC 5.29   HGB 15.9   HCT 48.3         Recent Labs   Lab 02/02/23  0023      K 4.0   CO2 23      BUN 11   CREATININE 0.8   ALKPHOS 95   ALT 12   AST 23   BILITOT 0.6    No results for input(s): PROTIME, INR, APTT, HEPANTIXA in the last 168 hours. No results for input(s): CPK, CPKMB, TROPONINI, MB in the last 168 hours.    Electrolytes: No replacement orders  Accuchecks: Q6H    Gtts:   sodium chloride 0.9% 50 mL/hr at 02/02/23 0305    dexmedetomidine (PRECEDEX) infusion 0.3  mcg/kg/hr (02/02/23 0307)    sodium chloride 0.9%         LDA/Wounds:  Lines/Drains/Airways       Drain  Duration             Male External Urinary Catheter 01/31/23 2300 1 day              Peripheral Intravenous Line  Duration                  Peripheral IV - Single Lumen 01/31/23 0845 20 G Anterior;Proximal;Right Forearm 1 day         Peripheral IV - Single Lumen 01/31/23 1935 18 G Right Upper Arm 1 day         Peripheral IV - Single Lumen 01/31/23 2032 18 G Left Forearm 1 day                  Wounds: No  Wound care consulted: No

## 2023-02-02 NOTE — ASSESSMENT & PLAN NOTE
- Permissive HTN in setting of acute stroke  - SBP <220      - Echo 1/31: There is no evidence of intracardiac shunting.   The left ventricle is normal in size with concentric remodeling and normal systolic function.   The estimated ejection fraction is 65%.   Normal left ventricular diastolic function.   Normal right ventricular size with normal right ventricular systolic function.   Normal central venous pressure (3 mmHg).   The estimated PA systolic pressure is 7 mmHg.   There is no pulmonary hypertension.

## 2023-02-02 NOTE — ASSESSMENT & PLAN NOTE
- Possible TC seizure at OSH, loaded with Keppra 3gms  - EEG read negative for seizure  - Cont Keppra 500mg BID

## 2023-02-02 NOTE — PROGRESS NOTES
[facility] - Neuro Critical Care  Neurocritical Care  Progress Note    Admit Date: 1/31/2023  Service Date: 02/02/2023  Length of Stay: 2    Subjective:     Chief Complaint: Acute right MCA stroke    History of Present Illness: Gabriel Springer 77 y.o. male with BPH, tobacco abuse, and asthma who presents to [facility] for R MCA infarct and encephalopathy. He presented to OSH with a chief complaint of left arm weakness.  He reports 2 weeks of left arm weakness and numbness that has been constant without aggravating alleviating factors.  He attempted no treatment at home.  He finds he is unable to pick things up with his left hand.  He denies any slurred speech or difficulty swallowing and no numbness of his legs. He finds he has been having trouble walking at home with increasing stumbling. While in ED OSH he had AMS and became hypoxic requiring intubation. There was concern for seizure. He was given keppra 3 gm and ativan 2mg. He is being admitted to [facility] for a higher level of care. History from chart due to LOC and intubation.     Hospital Course: 02/01/2023: Patient extubated to nasal cannula. Tolerating well. Will consult SLP for swallowing eval. EEG in place, read pending.  02/02/2023: NAEON. Precedex stopped, seroquel ordered. Haldol ordered prn. [facility] Soft diet started. Possible SD to stroke team today.    Review of Systems: Denies pain, asking to go home, denies SOB, chest pain, N/V    Vitals:   Temp: 97.8 °F (36.6 °C)  Pulse: 63  Rhythm: normal sinus rhythm  BP: (!) 178/74  MAP (mmHg): 106  Resp: 19  SpO2: 100 %    Temp  Min: 97.6 °F (36.4 °C)  Max: 98.3 °F (36.8 °C)  Pulse  Min: 52  Max: 91  BP  Min: 136/61  Max: 193/79  MAP (mmHg)  Min: 88  Max: 120  Resp  Min: 16  Max: 43  SpO2  Min: 93 %  Max: 100 %    02/01 0701 - 02/02 0700  In: 1315.7 [I.V.:1315.7]  Out: 2065 [Urine:2065]   Unmeasured Output  Urine Occurrence: 1  Stool Occurrence: 0  Pad Count: 2     Examination:   Constitutional: Well-nourished and -developed. No  apparent distress.   Eyes: Conjunctiva clear, anicteric. Lids no lesions.  Head/Ears/Nose/Mouth/Throat/Neck: Moist mucous membranes. External ears, nose atraumatic.   Cardiovascular: Regular rhythm. No leg edema.  Respiratory: Comfortable respirations. Clear to auscultation.  Gastrointestinal: Soft, nondistended, nontender. + bowel sounds.    Neurologic:  -GCS E 4 V 4 M 6  -Alert. Oriented to person, place, not time. Delayed responses. Follows commands.  -Cranial nerves: EOM intact, PERRL, no facial droop, + cough  -Motor: Moves all extremities spontaneously, antigravity  -Sensation: Intact to light touch    Medications:   Continuoussodium chloride 0.9%, Last Rate: 50 mL/hr at 02/02/23 1405  sodium chloride 0.9%    Scheduledalbuterol-ipratropium, 3 mL, Q6H  aspirin, 81 mg, Daily  atorvastatin, 40 mg, Daily  heparin (porcine), 5,000 Units, Q8H  levetiracetam IV, 500 mg, Q12H  mupirocin, , BID  nicotine, 1 patch, Daily  QUEtiapine, 25 mg, BID  senna-docusate 8.6-50 mg, 1 tablet, BID  silodosin, 4 mg, Daily    PRNacetaminophen, 500 mg, Q6H PRN  haloperidol lactate, 5 mg, Q6H PRN  ondansetron, 4 mg, Q8H PRN  sodium chloride 0.9%, 500 mL, Continuous PRN  sodium chloride 0.9%, 10 mL, PRN  sodium chloride 0.9%, 10 mL, PRN       Today I independently reviewed pertinent medications, lines/drains/airways, imaging, cardiology results, laboratory results, microbiology results, notably:     ISTAT:   No results for input(s): PH, PCO2, PO2, POCSATURATED, HCO3, BE, POCNA, POCK, POCTCO2, POCGLU, POCICA, POCLAC, SAMPLE in the last 24 hours.     Chem:   Recent Labs   Lab 02/02/23  0023      K 4.0      CO2 23   GLU 97   BUN 11   CREATININE 0.8   CALCIUM 9.1   MG 2.1   PHOS 3.7   ANIONGAP 10   PROT 6.2   ALBUMIN 3.2*   BILITOT 0.6   ALKPHOS 95   AST 23   ALT 12     Heme:   Recent Labs   Lab 02/02/23 0023   WBC 9.67   HGB 15.9   HCT 48.3        Endo:   Recent Labs   Lab 02/02/23  0022 02/02/23  0531   POCTGLUCOSE 88  82          Assessment/Plan:     Neuro  * Acute right MCA stroke  - VN following   - No TNK and not a candidate for intervention per VN  - MRI brain Extensive foci of diffusion restriction in the right MCA distribution as above, consistent acute right MCA territory infarction.  No evidence of hemorrhagic conversion.  - CTA: Acute right MCA distribution infarct.  Concern for small thrombus with stenosis and or diminished flow involving the inferior branch of the M2 segment of the right MCA. Remote left caudate head lacunar infarct.  - SBP Goal < 220  - Q 1 vitals   - Q 1 neuro checks   - TSH, A1c, lipid, echo and EKG  - Atorvastatin Daily   - Aspirin, SQ heparin   - PT/OT/SLP eval and treat  - [employer] Diet started per SLP  - Possible SD to stroke team    Seizure  - Possible TC seizure at OSH, loaded with Keppra 3gms  - EEG read negative for seizure  - Cont Keppra 500mg BID    Encephalopathy  Precedex off  Seroquel 25mg BID  Haldol prn    Cytotoxic brain edema  - see stroke    Pulmonary  Asthma  - duo nebs Q 6     Cardiac/Vascular  HTN (hypertension)  - Permissive HTN in setting of acute stroke  - SBP <220      - Echo 1/31: There is no evidence of intracardiac shunting.   The left ventricle is normal in size with concentric remodeling and normal systolic function.   The estimated ejection fraction is 65%.   Normal left ventricular diastolic function.   Normal right ventricular size with normal right ventricular systolic function.   Normal central venous pressure (3 mmHg).   The estimated PA systolic pressure is 7 mmHg.   There is no pulmonary hypertension.      Other  Impaired activities of daily living  - PT/OT eval and treat when appropriate     Tobacco abuse  - nicotine patch PRN    The patient is being Prophylaxed for:  Venous Thromboembolism with: Mechanical or Chemical  Stress Ulcer with: Not Applicable   Ventilator Pneumonia with: not applicable    Activity Orders          Diet Dysphagia Mechanical  Soft (IDDSI Level 5): Dysphagia 2 (Mechanical Soft Ground) starting at 02/02 1001    Turn patient starting at 02/01 0000    Elevate HOB starting at 01/31 2245    Progressive Mobility Protocol (mobilize patient to their highest level of functioning at least twice daily) starting at 01/31 1800    Elevate HOB Elevate (30-45 degrees) starting at 01/31 1323    Straight Cath starting at 01/31 1322        Full Code     Level III    Mickie Porter NP  Neurocritical Care  [facility]

## 2023-02-02 NOTE — PROCEDURES
EEG REPORT      Gabriel Springer  [id number]  1945    DATE OF SERVICE: 2/1/2023         METHODOLOGY      Extended electroencephalographic recording is made while the patient is ambulatory and continuing normal daily activities.  Electrodes are placed according to the International 10-20 placement system and included T1 and T2 electrode placement.  Twenty four (24) channels of digital signal (sampling rate of 512/sec) was simultaneously recorded from the scalp including EKG and eye monitors.  Recording band pass was 0.1 to 100 hz and all data was stored digitally on the recorder.  The patient is instructed to press an event button when clinical symptoms occur and write the symptoms into a diary. Activation procedures which include photic stimulation, hyperventilation and instructing patients to perform simple task are done in selected patients.        The EEG is displayed on a monitor screen and can be reformatted into different montages for evaluation.  The entire recoding is submitted for computer assisted analysis to detect spike and electrographic seizure activity.  The entire recording is visually reviewed and the times identified by computer analysis as being spikes or seizures are reviewed again.  Compresses spectral analysis (CSA) is also performed on the activity recorded from each individual channel.  This is displayed as a power display of frequencies from 0 to 30 Hz over time.   The CSA analysis is done and displayed continuously.  This is reviewed for asymmetries in power between homologous areas of the scalp and for presence of changes in power which canbe seen when seizures occur.  Sections of suspected abnormalities on the CSA is then compared with the original EEG recording.  .     etrigg software was also utilized in the review of this study.  This software suite analyzes the EEG recording in multiple domains.  Coherence and rhythmicity is computed to identify EEG sections which may contain  organized seizures.  Each channel undergoes analysis to detect presence of spike and sharp waves which have special and morphological characteristic of epileptic activity.  The routine EEG recording is converted from spacial into frequency domain.  This is then displayed comparing homologous areas to identify areas of significant asymmetry.  Algorithm to identify non-cortically generated artifact is used to separate eye movement, EMG and other artifact from the EEG     Recording Times    A total of 1:29:25 hours of EEG was recorded.      EEG FINDINGS:  Background activity:   The background rhythm was over the left hemisphere characterized by alpha and anterior dominant beta activity with a 10Hz posterior dominant alpha rhythm at 30-70 microvolts.   The background rhythm was over the right hemisphere characterized by poorly organized alpha and theta activity with absent posterior dominant alpha rhythm.      Sleep:   No sleep transients were seen.    Activation procedures:   NA    Abnormal activity:   No epileptiform discharges, periodic discharges, lateralized rhythmic delta activity or electrographic seizures were seen.    IMPRESSION:   Abnormal EEG due to mild right hemisphere dysfunction with no electrographic seizures or indications of seizure tendency.      Eber Merritt MD  Neurology-Epilepsy.  [facility].

## 2023-02-02 NOTE — HOSPITAL COURSE
Patient admitted to Neuro ICU on 1/31/23 for embolic stroke of the right MCA, etiology of undetermined source. On arrival NIHSS 26, following intubation, MRI with R temporal/parietal lobe infarcts and R subinsular cortex and posterior frontal lobe, CTA with small thrombus with stenosis /diminshed flow through R M2. EEG was negative with normal EF and no WMA on echo. He was extubated on 2/1/23 and stepped down to NPU for further monitoring as well as PT/OT/SLP. He was started on DAPT, HIS with BP control. He tolerated PO well with active bowel movements. Progressive neurological improvement (NIH 4) with PT/OT recommending IPR however the patient declined and said he would not go. While admitted patient had urinary retention, placed was ramirez and started silodosin, however patient self removed ramirez on 2/5. Patient passed self voiding trial with adequate UOP before discharge. He will need to follow up outpatient with vascular neurology for further evaluation of carotid disease and possible interventions. He will also need a cardiac event monitor for further investigation of a possible source.     2/2/2023 Extubated yesterday (2/1).  Tolerating well.  Off Precedex since 0900 today, redirectable.  Therapy recommending IPR.  Awaiting stepdown to NPU.  02/03/2023 Patient with urinary retention overnight, silodosin initiated. Patient required Haldol injections overnight. Seroquel scheduled. Patient is only on ASA at this time. Recommend DAPT. Etiology ESUS at this time. Will need cardiac event monitor at discharge. Therapy with recs for IPR and minced & moist diet with thin liquids.   2/4/2023: KRYSTIAN, neuro stable, patient agitated with nursing staff and doesn't like to be bothered, continued on DAPT, BP fluctuating, will start Norvasc 5 mg. Tolerating PO. Will need placement for IPR.    2/5/2023: KRYSTIAN, neuro stable- NIH 7, tolerating PO with active bowel movements. Pt has been declining straight cath, will need to place  ramirez if he continues. Increasing Norvasc to 10 mg daily. Pending inpatient rehab placement.   2/6/2023: Neuro stable- NIH 5, patient pulled out ramirez overnight, BP controlled, patient declining IPR and trying to leave. Will order home health today. Patient was able to void on his own in the restroom with good UOP.     Gabriel Springer was hemodynamically stable, afebrile, with improved left sided weakness on discharge.     Brief Discharge Plan:  - Scheduled follow-up appointment with Scott Guerra Jr, MD, patient's primary care provider  - Hospital discharge follow up was scheduled with vascular neurology in 4 weeks for carotid disease.   - Ordered Home health with PT/OT/SLP services  - Ordered Cardiac Event monitor.   - Ordered bedside commode, wheelchair, and shower chair.   - Education was provided to the patient and family regarding patient's disease and treatment options  - A comprehensive and reconciled medication list was provided on discharge to the patient   - Resumed all home medications   - Started Asa 81 mg, Plavix 75 mg, Lipitor 40 mg, Norvasc 10 mg  - Stopped no medications  - No pending labs or imaging on discharge      Pt Gabriel Springer was admitted to the medicine service on 1/31/2023 and discharged on 2/6/2023 for the treatment of: Embolic stroke of the right CVA.    Advised at discharge to:    Please follow up with your Primary Care Physician if symptoms persist and for medication management as needed.    Please return to the Emergency Department if you begin to experience worsening symptoms of weakness, altered mental status, slurred speech and if you begin to experience fever, chills, nausea or vomiting.    Please continue home medications as prescribed.

## 2023-02-02 NOTE — ASSESSMENT & PLAN NOTE
77 y.o. male, with R MCA infarct and [place] [facility] [place]oms include left arm weakness and numbness that has been constant without aggravating alleviating factors for 2 weeks.     -Extubated yesterday (2/1).  Tolerating well.  Off Precedex since 0900 today, redirectable.  Therapy recommending IPR.  Awaiting stepdown to NPU.    Antithrombotics for secondary stroke prevention: Antiplatelets: Aspirin: 81 mg daily and Clopidogrel: 75 mg daily when safe to start given size of stroke & hemicraniotomy watch    Statins for secondary stroke prevention and hyperlipidemia, if present:   Statins: Atorvastatin- 40 mg daily    Aggressive risk factor modification: HTN, HLD     Rehab efforts: The patient has been evaluated by a stroke team provider and the therapy needs have been fully considered based off the presenting complaints and exam findings. The following therapy evaluations are needed: PT evaluate and treat, OT evaluate and treat, SLP evaluate and treat    Diagnostics ordered/pending: None     VTE prophylaxis: Heparin 5000 units SQ every 8 hours    BP parameters: Infarct: No intervention, SBP <220

## 2023-02-02 NOTE — PLAN OF CARE
Goals remain appropriate.  Problem: Occupational Therapy  Goal: Occupational Therapy Goal  Description: Goals set 2/1 to be addressed for 14 days with expiration date, 2/15:  Patient will increase functional independence with ADLs by performing:    Patient will demonstrate rolling to the right with min assist.  Not met   Patient will demonstrate rolling to the left with min assist.   Not met  Patient will demonstrate supine -sit with min assist.   Not met  Patient will demonstrate stand pivot transfers with min assist.   Not met  Patient will demonstrate grooming while standing with min assist.   Not met  Patient will demonstrate upper body dressing with min assist while seated EOB.   Not met  Patient will demonstrate lower body dressing with min assist while seated EOB.   Not met  Patient will demonstrate toileting with min assist.   Not met  Patient will demonstrate bathing while seated EOB with min assist.   Not met  Patient's family / caregiver will demonstrate independence and safety with assisting patient with self-care skills and functional mobility.     Not met  Patient's family / caregiver will demonstrate independence with providing ROM and changes in bed positioning.   Not met  Patient and/or patient's family will verbalize understanding of stroke prevention guidelines, personal risk factors and stroke warning signs via teachback method.  Not met             Outcome: Ongoing, Progressing

## 2023-02-02 NOTE — PLAN OF CARE
"SW met with patient to discuss OT recommendation for rehab upon d/c. Pt stated,"I want to go home not rehab". SW informed pt the other option;is having Home Health with PT/OT. SW will continue to follow.        ISAÍAS ChildersN - [facility]  [id number]   "

## 2023-02-02 NOTE — PT/OT/SLP EVAL
"Speech Language Pathology Evaluation  Cognitive/Bedside Swallow    Patient Name:  Gabriel Springer   MRN:  [id number]  Admitting Diagnosis: Acute right MCA stroke    Recommendations:                  General Recommendations:  Dysphagia therapy and Cognitive-linguistic therapy  Diet recommendations:  Minced & Moist Diet - IDDSI Level 5, Thin liquids - IDDSI Level 0   Aspiration Precautions: 1 bite/sip at a time, Assistance with meals, Avoid talking while eating, Eliminate distractions, Feed only when awake/alert, HOB to 90 degrees, Meds crushed in puree, No straws, Small bites/sips, and Strict aspiration precautions   General Precautions: Standard, fall, aspiration, vision impaired  Communication strategies:  none    History:     Past Medical History:   Diagnosis Date    Prostate atrophy        History reviewed. No pertinent surgical history.    Social History: Patient reported living with a friend. He endorsed independence with ADLs, finances, medication management, schedules, etc.    Prior Intubation HX:  1/31-2/1    Modified Barium Swallow: none on file    Chest X-Rays: 1/31: "FINDINGS:  Of cardiac size is normal.  Endotracheal tube and enteric tube present.  Some scarring and fibrosis is seen in the upper lung fields and there may be changes of obstructive airway disease present.  No obvious infiltrate."    Prior diet: reported soft solids and thin liquids 2/2 edentulous     Occupation/hobbies/homemaking: retired , enjoys going [place] the race track to see the horses    Subjective     SLP communicated with RN prior to entry and received clearance for therapy this date.   RT at bedside with pt participating in breathing treatment upon first attempt. Pt awake/alert upon second attempt, agreeable to participate.    Pain/Comfort:  Pain Rating 1: 0/10  Pain Rating Post-Intervention 1: 0/10    Respiratory Status: room air    Objective:     Cognitive Status:    Arousal/Alertness Appropriate response to " stimuli  Attention Sustained attention deficit    Orientation Person and Situation  Problem Solving Categories (identified 5 items in category given 1 minute, achieved 100% acc when identifying category given members), Sequencing (4/4 word list with correctly identified attribute, Solutions (60% acc ind'ly, 90% acc given cues), and Compare/contrast (100% acc)  Safety awareness - poor   Reasoning - poor deductive reasoning, inferencing      Receptive Language:   Comprehension:      WFL    Expressive Language:  Verbal:    Verbal language skills were wfl with no evidence of aphasia.  Pt. Expressed their thoughts coherently in conversation with no evidence of word finding deficits      Motor Speech:  WFL    Voice:   WFL    Visual-Spatial:  L inattention - pt tracked SLP to midline given mod verbal/visual cues, pt tracked SLP to L given mod-max cues though demonstrated inability to maintain gaze towards L. Required consistent cues to attend to L during evaluation.     Reading:   tbd      Written Expression:   tbd    Oral Musculature Evaluation  Oral Musculature: WFL  Dentition: edentulous  Secretion Management: adequate  Oral Labial Strength and Mobility: WFL  Lingual Strength and Mobility: WFL  Volitional Cough: fair strength  Volitional Swallow: timely  Voice Prior to PO Intake: clear, intermittent hoarseness evident    Bedside Swallow Eval:   Consistencies Assessed:  Thin liquids - x1 ice chip, x1 tsp, x3 cup-edge sips  Puree - 1/4 tsp chocolate pudding   Solids - 1/8 tina cracker across 2 small bites    **pt demonstrated minimal participate in po trials despite max cues     Oral Phase:   Mod oral residue of dry tina cracker     Pharyngeal Phase:   no overt clinical signs/symptoms of aspiration  no overt clinical signs/symptoms of pharyngeal dysphagia    Compensatory Strategies  None    Education: ST educated pt re: role of SLP, current impressions, risk factors for aspiration, current po rec, safe swallow  precautions, and POC moving forward to which pt verbalized understanding though would benefit from reinforcement. Continue ST per POC.    Assessment:     Gabriel Springer is a 77 y.o. male with an SLP diagnosis of Dysphagia, Cognitive-Linguistic Impairment, and Visio-Spatial Impairment.      Goals:   Multidisciplinary Problems       SLP Goals          Problem: SLP    Goal Priority Disciplines Outcome   SLP Goal     SLP Ongoing, Progressing   Description: Speech Language Pathology Goals  Goals expected to be met by 2/9:  1. Pt will participate in ongoing assessment of swallow function to determine safest and least restrictive diet.   2. Pt will participate in visuospatial tasks targeting L intattention with 75% acc given mod cues.  3. Pt will orient to place/time concepts with 75% acc given min-mod cues.  4. Pt will participate in mod level problem solving/reasoning tasks with 75% acc given mod cues.  5. Pt will participate in ongoing assessment of reading/writing skills to determine future therapeutic plan of care.                        Plan:     Patient to be seen:  4 x/week   Plan of Care expires:  03/03/23  Plan of Care reviewed with:  patient   SLP Follow-Up:  Yes       Discharge recommendations:  Discharge Facility/Level of Care Needs: rehabilitation facility     Time Tracking:     SLP Treatment Date:   02/02/23  Speech Start Time:  0842  Speech Stop Time:  0907     Speech Total Time (min):  25 min    Billable Minutes: Eval 9 , Eval Swallow and Oral Function 8, and Self Care/Home Management Training 8    02/02/2023

## 2023-02-02 NOTE — PLAN OF CARE
PT Vito complete, appropriate goals created    Problem: Physical Therapy  Goal: Physical Therapy Goal  Description: Goals to be met by: 23     Patient will increase functional independence with mobility by performin. Supine to sit with [place]  2. Sit to stand transfer with [place]  3. Bed to chair transfer with [place] using No Assistive Device  4. Gait  x 100 feet with [place] using No Assistive Device.   5. Lower extremity exercise program x12 reps per handout, with independence    Outcome: Ongoing, Progressing

## 2023-02-02 NOTE — SUBJECTIVE & OBJECTIVE
Review of Systems: Denies pain, asking to go home, denies SOB, chest pain, N/V    Vitals:   Temp: 97.8 °F (36.6 °C)  Pulse: 63  Rhythm: normal sinus rhythm  BP: (!) 178/74  MAP (mmHg): 106  Resp: 19  SpO2: 100 %    Temp  Min: 97.6 °F (36.4 °C)  Max: 98.3 °F (36.8 °C)  Pulse  Min: 52  Max: 91  BP  Min: 136/61  Max: 193/79  MAP (mmHg)  Min: 88  Max: 120  Resp  Min: 16  Max: 43  SpO2  Min: 93 %  Max: 100 %    02/01 0701 - 02/02 0700  In: 1315.7 [I.V.:1315.7]  Out: 2065 [Urine:2065]   Unmeasured Output  Urine Occurrence: 1  Stool Occurrence: 0  Pad Count: 2     Examination:   Constitutional: Well-nourished and -developed. No apparent distress.   Eyes: Conjunctiva clear, anicteric. Lids no lesions.  Head/Ears/Nose/Mouth/Throat/Neck: Moist mucous membranes. External ears, nose atraumatic.   Cardiovascular: Regular rhythm. No leg edema.  Respiratory: Comfortable respirations. Clear to auscultation.  Gastrointestinal: Soft, nondistended, nontender. + bowel sounds.    Neurologic:  -GCS E 4 V 4 M 6  -Alert. Oriented to person, place, not time. Delayed responses. Follows commands.  -Cranial nerves: EOM intact, PERRL, no facial droop, + cough  -Motor: Moves all extremities spontaneously, antigravity  -Sensation: Intact to light touch    Medications:   Continuoussodium chloride 0.9%, Last Rate: 50 mL/hr at 02/02/23 1405  sodium chloride 0.9%    Scheduledalbuterol-ipratropium, 3 mL, Q6H  aspirin, 81 mg, Daily  atorvastatin, 40 mg, Daily  heparin (porcine), 5,000 Units, Q8H  levetiracetam IV, 500 mg, Q12H  mupirocin, , BID  nicotine, 1 patch, Daily  QUEtiapine, 25 mg, BID  senna-docusate 8.6-50 mg, 1 tablet, BID  silodosin, 4 mg, Daily    PRNacetaminophen, 500 mg, Q6H PRN  haloperidol lactate, 5 mg, Q6H PRN  ondansetron, 4 mg, Q8H PRN  sodium chloride 0.9%, 500 mL, Continuous PRN  sodium chloride 0.9%, 10 mL, PRN  sodium chloride 0.9%, 10 mL, PRN       Today I independently reviewed pertinent medications, lines/drains/airways,  imaging, cardiology results, laboratory results, microbiology results, notably:     ISTAT:   No results for input(s): PH, PCO2, PO2, POCSATURATED, HCO3, BE, POCNA, POCK, POCTCO2, POCGLU, POCICA, POCLAC, SAMPLE in the last 24 hours.     Chem:   Recent Labs   Lab 02/02/23 0023      K 4.0      CO2 23   GLU 97   BUN 11   CREATININE 0.8   CALCIUM 9.1   MG 2.1   PHOS 3.7   ANIONGAP 10   PROT 6.2   ALBUMIN 3.2*   BILITOT 0.6   ALKPHOS 95   AST 23   ALT 12     Heme:   Recent Labs   Lab 02/02/23 0023   WBC 9.67   HGB 15.9   HCT 48.3        Endo:   Recent Labs   Lab 02/02/23 0022 02/02/23  0531   POCTGLUCOSE 88 82

## 2023-02-02 NOTE — ASSESSMENT & PLAN NOTE
- VN following   - No TNK and not a candidate for intervention per VN  - MRI brain Extensive foci of diffusion restriction in the right MCA distribution as above, consistent acute right MCA territory infarction.  No evidence of hemorrhagic conversion.  - CTA: Acute right MCA distribution infarct.  Concern for small thrombus with stenosis and or diminished flow involving the inferior branch of the M2 segment of the right MCA. Remote left caudate head lacunar infarct.  - SBP Goal < 220  - Q 1 vitals   - Q 1 neuro checks   - TSH, A1c, lipid, echo and EKG  - Atorvastatin Daily   - Aspirin, SQ heparin   - PT/OT/SLP eval and treat  - [employer]ch[employer] Diet started per SLP  - Possible SD to stroke team

## 2023-02-02 NOTE — PT/OT/SLP EVAL
"Physical Therapy Evaluation and Treatment    Patient Name: Gabriel Springer   MRN: [id number]  Recent Surgery: * No surgery found *    Rehab technician utilized to assist w/ treatment session 2/2 pt requiring two person assist for functional mobility   Recommendations:     Discharge Recommendations: rehabilitation facility   Discharge Equipment Recommendations: bath bench   Barriers to discharge: Increased level of assist and Decreased caregiver support    Assessment:     Gabriel Springer is a 77 y.o. male admitted with a medical diagnosis of Acute right MCA stroke. He presents with the following impairments/functional limitations: weakness, impaired self care skills, impaired functional mobility, gait instability, impaired balance, decreased coordination, decreased safety awareness. Pt w/ good strength and mobility, w/ dec coordination in LUE but pt stating "my L hand has always been hard headed, this isn't new". Pt requiring CGA for ambulation and Pam for backwards walking 2/2 dec coordination and safety awareness. Rec d/c to IPR for continued treatment in order to maximize functional return as pt w/ high motivation, good activity tolerance, and w/ potential for significant improvements in functional mobility.      Rehab Prognosis: Good; patient would benefit from acute skilled PT services to address these deficits and reach maximum level of function.  Recent Surgery: * No surgery found *      Plan:     During this hospitalization, patient to be seen 4 x/week to address the identified rehab impairments via gait training, therapeutic activities, therapeutic exercises, neuromuscular re-education and progress toward the following goals:    Plan of Care Expires: 02/24/23    Subjective     Chief Complaint: none  Patient/Family Comments/Goals: "the only thing that's changed since the stroke is I get a lot more attention now, but I'm fine"  Pain/Comfort:  Pain Rating 1: 0/10  Pain Rating Post-Intervention 1: 0/10    Patients " "cultural, spiritual, Mormonism conflicts given the current situation: no    Social History:  Living Environment: Patient  lives with "a female"  in an  8th floor apartment, elevator.  Prior Level of Function: Prior to admission, patient was independent with ADLs.  Equipment Used at Home: none    DME owned (not currently used): none  Assistance Upon Discharge:  "I live with a female who can help me when she's willing"    Objective:     Communicated with RN prior to session. Patient found HOB elevated with bed alarm, telemetry, peripheral IV, Condom Catheter, pulse ox (continuous) upon PT entry to room.    General Precautions: Standard, fall, aspiration, vision impaired   Orthopedic Precautions:    Braces: N/A  Respiratory Status: Room air    Exams:  Cognitive Exam: Patient is oriented to Person, Place, Time, Situation, follows commands 100% of the time  RLE ROM: WFL  RLE Strength: WFL  LLE ROM: WFL  LLE Strength: WFL  Sensation: Intact light touch to BLEs  Coordination: dec coordination LUE w/ pt reporting that is his baseline    Functional Mobility:  Bed Mobility:  Supine to Sit: stand by assistance  Sit to Supine: stand by assistance  Transfers:  Sit to Stand: contact guard assistance with hand-held assist  Gait:   Patient ambulated 12' fwd and back with hand-held assist and  CGA for forward walking, Pam x2 for backward walking . Patient demonstrates occasional unsteady gait, wide base of support, decreased weight shift, and flexed posture.  All lines remained intact throughout ambulation trial, gait belt utilized.  Balance:   Static Sitting: stand by assistance at EOB  Dynamic Sitting: contact guard assistance at EOB  Static Standing: contact guard assistance with no AD  Dynamic Standing: contact guard assistance with no AD    Therapeutic Activities and Exercises:  Patient educated on role of acute care PT and PT POC, safety while in hospital including calling nurse for mobility, and call light usage  Patient " educated about importance of OOB mobility  Gait training w/ cueing to narrow his YAMILE, slow down, stand upright not flexed forward, and focus on balance    Patient clear to ambulate to/from bathroom with RN/PCT, assist x1 .    AM-PAC 6 CLICK MOBILITY  Turning over in bed (including adjusting bedclothes, sheets and blankets)?: 4  Sitting down on and standing up from a chair with arms (e.g., wheelchair, bedside commode, etc.): 3  Moving from lying on back to sitting on the side of the bed?: 3  Moving to and from a bed to a chair (including a wheelchair)?: 3  Need to walk in hospital room?: 3  Climbing 3-5 steps with a railing?: 3  Basic Mobility Total Score: 19     Patient left HOB elevated with all lines intact, call button in reach, RN notified, and bed alarm on.    GOALS:   Multidisciplinary Problems       Physical Therapy Goals          Problem: Physical Therapy    Goal Priority Disciplines Outcome Goal Variances Interventions   Physical Therapy Goal     PT, PT/OT Ongoing, Progressing     Description: Goals to be met by: 23     Patient will increase functional independence with mobility by performin. Supine to sit with [place]  2. Sit to stand transfer with [place]  3. Bed to chair transfer with [place] using No Assistive Device  4. Gait  x 100 feet with [place] using No Assistive Device.   5. Lower extremity exercise program x12 reps per handout, with independence                         History:     Past Medical History:   Diagnosis Date    Prostate atrophy        History reviewed. No pertinent surgical history.    Time Tracking:     PT Received On: 23  PT Start Time: 911  PT Stop Time: 925  PT Total Time (min): 14 min     Billable Minutes: Evaluation 6 and Neuromuscular Re-education 8    2023

## 2023-02-02 NOTE — ASSESSMENT & PLAN NOTE
Stroke risk factor  Patient hypertensive as high as 245/121 on admission.  Was on cardene, stopped on 2/1 at 0843.  24° SBPs 136-193  He is not noted to be on any BP meds at home.

## 2023-02-02 NOTE — PT/OT/SLP PROGRESS
"Occupational Therapy   Treatment / Occupational Profile    Name: Gabriel Springer  MRN: [id number]  Admitting Diagnosis:  Acute right MCA stroke       Recommendations:     Discharge Recommendations: rehabilitation facility  Discharge Equipment Recommendations:  bath bench, bedside commode  Barriers to discharge:  None    Assessment:     Gabriel Springer is a 77 y.o. male with a medical diagnosis of Acute right MCA stroke.  He presents with performance deficits affecting function are weakness, abnormal tone, decreased ROM, impaired cognition, impaired endurance, impaired sensation, decreased coordination, decreased upper extremity function, impaired self care skills, impaired functional mobility, decreased lower extremity function, decreased safety awareness, gait instability, impaired balance.     Rehab Prognosis:  Good; patient would benefit from acute skilled OT services to address these deficits and reach maximum level of function.       Plan:     Patient to be seen 3 x/week to address the above listed problems via sensory integration, cognitive retraining, neuromuscular re-education, therapeutic exercises, therapeutic activities, self-care/home management  Plan of Care Expires: 03/01/23  Plan of Care Reviewed with: patient    Subjective   Patient:  "I need to go to the bathroom."    Pain/Comfort:  Pain Rating 1: 0/10  Pain Rating Post-Intervention 1: 0/10    Objective:     Communicated with: Nurse prior to session.  Patient found supine with bed alarm, telemetry, restraints, peripheral IV, Condom Catheter, pulse ox (continuous) upon OT entry to room.    General Precautions: Standard, aspiration, fall    Orthopedic Precautions:N/A  Braces: N/A  Respiratory Status: Room air  Occupational Profile:     Occupational Performance:   Per patient:  Patient resides in [place] alone in 8th floor apt with elevator access.  Patient is right handed.  PTA patient independent with ADLs including driving.  Currently owns no DME.  " Hobbies: Fishing.      Bed Mobility:    Patient completed Rolling/Turning to Left with  contact guard assistance  Patient completed Rolling/Turning to Right with contact guard assistance  Patient completed Supine to Sit with minimum assistance  Patient completed Sit to Supine with minimum assistance     Functional Mobility/Transfers:  Patient completed Sit <> Stand Transfer with minimum assistance  with  no assistive device   Patient completed Bed <> Chair Transfer using Stand Pivot technique with minimum assistance with no assistive device    Activities of Daily Living:  Upper Body Dressing: moderate assistance while seated EOB  Lower Body Dressing: moderate assistance while seated EOB    [facility] 6 Click ADL: 11    Treatment & Education:  Patient alert and oriented x 3; able to follow 4/4 one step commands.  Patient attentive and interactive throughout the session. Addressed thoracic and cervical extension while seated and standing during ADLs.    Patient left supine with all lines intact, call button in reach, and bed alarm on    GOALS:   Multidisciplinary Problems       Occupational Therapy Goals          Problem: Occupational Therapy    Goal Priority Disciplines Outcome Interventions   Occupational Therapy Goal     OT, PT/OT Ongoing, Progressing    Description: Goals set 2/1 to be addressed for 14 days with expiration date, 2/15:  Patient will increase functional independence with ADLs by performing:    Patient will demonstrate rolling to the right with min assist.  Not met   Patient will demonstrate rolling to the left with min assist.   Not met  Patient will demonstrate supine -sit with min assist.   Not met  Patient will demonstrate stand pivot transfers with min assist.   Not met  Patient will demonstrate grooming while standing with min assist.   Not met  Patient will demonstrate upper body dressing with min assist while seated EOB.   Not met  Patient will demonstrate lower body dressing with min assist while  seated EOB.   Not met  Patient will demonstrate toileting with min assist.   Not met  Patient will demonstrate bathing while seated EOB with min assist.   Not met  Patient's family / caregiver will demonstrate independence and safety with assisting patient with self-care skills and functional mobility.     Not met  Patient's family / caregiver will demonstrate independence with providing ROM and changes in bed positioning.   Not met  Patient and/or patient's family will verbalize understanding of stroke prevention guidelines, personal risk factors and stroke warning signs via teachback method.  Not met                                  Time Tracking:     OT Date of Treatment: 02/02/23  OT Start Time: 0430  OT Stop Time: 0453  OT Total Time (min): 23 min    Billable Minutes:Self Care/Home Management 13  Neuromuscular Re-education 10    OT/MUSA: OT          2/2/2023

## 2023-02-02 NOTE — SUBJECTIVE & OBJECTIVE
Neurologic Chief Complaint: R MCA stroke    Subjective:     Interval History: Patient is seen for follow-up neurological assessment and treatment recommendations: Extubated yesterday (2/1).  Tolerating well.  Off Precedex since 0900 today, redirectable.  Therapy recommending IPR.  Awaiting stepdown to NPU.    HPI, Past Medical, Family, and Social History remains the same as documented in the initial encounter.     Review of Systems   Constitutional:  Negative for chills and fever.   HENT:  Negative for drooling and rhinorrhea.    Eyes:  Positive for visual disturbance. Negative for discharge.   Respiratory:  Negative for cough.    Cardiovascular:  Negative for chest pain and leg swelling.   Gastrointestinal:  Negative for abdominal pain, diarrhea, nausea and vomiting.   Genitourinary:  Positive for hematuria. Negative for urgency.   Musculoskeletal:  Negative for neck pain and neck stiffness.   Skin:  Negative for rash.   Allergic/Immunologic: Negative for environmental allergies and food allergies.   Neurological:  Positive for weakness and numbness.   Psychiatric/Behavioral:  Positive for agitation (was on Precedex, currently off gtt and redirectable).    Scheduled Meds:   albuterol-ipratropium  3 mL Nebulization Q6H    aspirin  81 mg Oral Daily    atorvastatin  40 mg Oral Daily    heparin (porcine)  5,000 Units Subcutaneous Q8H    levetiracetam IV  500 mg Intravenous Q12H    mupirocin   Nasal BID    nicotine  1 patch Transdermal Daily    QUEtiapine  25 mg Oral BID    senna-docusate 8.6-50 mg  1 tablet Oral BID    silodosin  4 mg Oral Daily     Continuous Infusions:   sodium chloride 0.9% 50 mL/hr at 02/02/23 1505    sodium chloride 0.9%       PRN Meds:acetaminophen, haloperidol lactate, ondansetron, sodium chloride 0.9%, sodium chloride 0.9%, sodium chloride 0.9%    Objective:     Vital Signs (Most Recent):  Temp: 97.8 °F (36.6 °C) (02/02/23 0705)  Pulse: 71 (02/02/23 1505)  Resp: (!) 24 (02/02/23 1505)  BP: (!)  170/74 (02/02/23 1505)  SpO2: 100 % (02/02/23 1505)  BP Location: Left arm    Vital Signs Range (Last 24H):  Temp:  [97.6 °F (36.4 °C)-98.3 °F (36.8 °C)]   Pulse:  [52-91]   Resp:  [16-43]   BP: (136-193)/()   SpO2:  [93 %-100 %]   BP Location: Left arm    Physical Exam  Vitals and nursing note reviewed.   Constitutional:       General: He is not in acute distress.     Appearance: He is well-developed. He is not diaphoretic.   HENT:      Head: Normocephalic and atraumatic.      Right Ear: External ear normal.      Left Ear: External ear normal.      Nose: Nose normal.      Mouth/Throat:      Mouth: Mucous membranes are moist.   Eyes:      General: No scleral icterus.        Right eye: No discharge.         Left eye: No discharge.      Extraocular Movements:      Right eye: Abnormal extraocular motion present.      Left eye: Abnormal extraocular motion present.   Cardiovascular:      Rate and Rhythm: Normal rate and regular rhythm.      Heart sounds: Normal heart sounds.   Pulmonary:      Effort: Pulmonary effort is normal. No respiratory distress.   Abdominal:      General: There is no distension.      Palpations: Abdomen is soft.      Tenderness: There is no abdominal tenderness.   Musculoskeletal:      Cervical back: Normal range of motion and neck supple.   Skin:     General: Skin is warm and dry.      Capillary Refill: Capillary refill takes less than 2 seconds.   Neurological:      Mental Status: He is alert and oriented to person, place, and time.      Cranial Nerves: Cranial nerve deficit present.      Sensory: Sensory deficit present.      Motor: Weakness present.   Psychiatric:         Attention and Perception: He is inattentive.         Mood and Affect: Mood is anxious. Affect is blunt.         Behavior: Behavior is cooperative.       Neurological Exam:   LOC: alert  Attention Span: Good   Language: mixed aphasia  Articulation: No dysarthria  Orientation: Person, Place, Time   Visual Fields:  Hemianopsia left  EOM (CN III, IV, VI): Gaze preference  right  Facial Movement (CN VII): Symmetric facial expression    Motor: Arm left  Paresis: 1/5  Leg left  Paresis: 1/5  Arm right  Normal 5/5  Leg right Normal 5/5  Sensation: Talat-anesthesia left    Laboratory:  CMP:   Recent Labs   Lab 02/02/23  0023   CALCIUM 9.1   ALBUMIN 3.2*   PROT 6.2      K 4.0   CO2 23      BUN 11   CREATININE 0.8   ALKPHOS 95   ALT 12   AST 23   BILITOT 0.6     CBC:   Recent Labs   Lab 02/02/23 0023   WBC 9.67   RBC 5.29   HGB 15.9   HCT 48.3      MCV 91   MCH 30.1   MCHC 32.9     Lipid Panel:   Recent Labs   Lab 01/31/23  0936   CHOL 176   LDLCALC 108.2   HDL 55   TRIG 64     Coagulation: No results for input(s): PT, INR, APTT in the last 168 hours.  Hgb A1C:   Recent Labs   Lab 01/31/23  0845   HGBA1C 4.8     TSH:   Recent Labs   Lab 01/31/23 0845   TSH 2.132       Diagnostic Results   Brain imaging:     MRI 1/31/2023 @ [facility]SH  Impression:  Extensive foci of diffusion restriction in the right MCA distribution as above, consistent acute right MCA territory infarction.  No evidence of hemorrhagic conversion.  Changes of chronic small vessel ischemic disease and cerebral volume loss.        Vessel Imaging:  CTA 1/31/2023 @ OSH   Impression:  Acute right MCA distribution infarct.  Concern for small thrombus with stenosis and or diminished flow involving the inferior branch of the M2 segment of the right MCA.  Remote left caudate head lacunar infarct.  Bullous emphysematous changes.     Cardiac Evaluation:   Echo 1/31/2023   There is no evidence of intracardiac shunting.  The left ventricle is normal in size with concentric remodeling and normal systolic function.  The estimated ejection fraction is 65%.  Normal left ventricular diastolic function.  Normal right ventricular size with normal right ventricular systolic function.  Normal central venous pressure (3 mmHg).  The estimated PA systolic pressure is 7  mmHg.  There is no pulmonary hypertension.

## 2023-02-02 NOTE — PLAN OF CARE
[facility] Neuro Critical Care  Discharge Reassessment    Primary Care Provider: Scott Guerra Jr, MD    Expected Discharge Date: 2/6/2023    Per MD: 2/2/2023 Extubated yesterday (2/1).  Tolerating well.  Off Precedex since 0900 today, redirectable.  Therapy recommending IPR.  Awaiting stepdown to NPU.     Per SW, patient is refusing Inpatient Rehab    Reassessment (most recent)       Discharge Reassessment - 02/02/23 1629          Discharge Reassessment    Assessment Type Discharge Planning Reassessment     Did the patient's condition or plan change since previous assessment? No     Communicated DWAIN with patient/caregiver Date not available/Unable to determine     Discharge Plan A Home Health     Discharge Plan B Rehab     DME Needed Upon Discharge  other (see comments)   tbd    Discharge Barriers Identified None     Why the patient remains in the hospital Requires continued medical care                     Sara Durán RN, CCRN-K, [facility]  Neuro-Critical Care   [phone]

## 2023-02-02 NOTE — PLAN OF CARE
[facility] Care Plan    POC reviewed with Gabriel Springer and family at 1400. Pt verbalized understanding. Questions and concerns addressed. No acute events today. Pt progressing toward goals. Will continue to monitor. See below and flowsheets for full assessment and VS info.     -precedex stopped  -haldol administered x1  -no change in neuro status      Is this a stroke patient? yes- Stroke booklet reviewed with patient and family, risk factors identified for patient and stroke booklet remains at bedside for ongoing education.     Neuro:  [place] Coma Scale  Best Eye Response: 4-->(E4) spontaneous  Best Motor Response: 6-->(M6) obeys commands  Best Verbal Response: 4-->(V4) confused  [place] Coma Scale Score: 14  Assessment Qualifiers: patient chemically sedated or paralyzed  Pupil PERRLA: yes     24 hr Temp:  [97.6 °F (36.4 °C)-98.3 °F (36.8 °C)]     CV:   Rhythm: normal sinus rhythm  BP goals:   SBP < 220  MAP > 65    Resp:      Vent Mode: Spont  Set Rate: 20 BPM  Oxygen Concentration (%): 40  Vt Set: 470 mL  PEEP/CPAP: 5 cmH20  Pressure Support: 5 cmH20    Plan: N/A    GI/:     Diet/Nutrition Received: NPO  Last Bowel Movement: 02/02/23  Voiding Characteristics: external catheter, voids spontaneously without difficulty    Intake/Output Summary (Last 24 hours) at 2/2/2023 1748  Last data filed at 2/2/2023 1705  Gross per 24 hour   Intake 1299.35 ml   Output 1150 ml   Net 149.35 ml     Unmeasured Output  Urine Occurrence: 1  Stool Occurrence: 0  Pad Count: 2    Labs/Accuchecks:  Recent Labs   Lab 02/02/23  0023   WBC 9.67   RBC 5.29   HGB 15.9   HCT 48.3         Recent Labs   Lab 02/02/23  0023      K 4.0   CO2 23      BUN 11   CREATININE 0.8   ALKPHOS 95   ALT 12   AST 23   BILITOT 0.6    No results for input(s): PROTIME, INR, APTT, HEPANTIXA in the last 168 hours. No results for input(s): CPK, CPKMB, TROPONINI, MB in the last 168 hours.    Electrolytes: N/A - electrolytes WDL  Accuchecks:  none    Gtts:   sodium chloride 0.9% 50 mL/hr at 02/02/23 1705    sodium chloride 0.9%         LDA/Wounds:  Lines/Drains/Airways       Drain  Duration             Male External Urinary Catheter 01/31/23 2300 1 day              Peripheral Intravenous Line  Duration                  Peripheral IV - Single Lumen 01/31/23 0845 20 G Anterior;Proximal;Right Forearm 2 days         Peripheral IV - Single Lumen 01/31/23 1935 18 G Right Upper Arm 1 day         Peripheral IV - Single Lumen 01/31/23 2032 18 G Left Forearm 1 day                  Wounds: No  Wound care consulted: No

## 2023-02-03 PROBLEM — R33.9 URINARY RETENTION: Status: ACTIVE | Noted: 2023-02-03

## 2023-02-03 PROBLEM — I63.411 EMBOLIC STROKE INVOLVING RIGHT MIDDLE CEREBRAL ARTERY: Status: ACTIVE | Noted: 2023-01-31

## 2023-02-03 LAB
ALBUMIN SERPL BCP-MCNC: 3.9 G/DL (ref 3.5–5.2)
ALP SERPL-CCNC: 114 U/L (ref 55–135)
ALT SERPL W/O P-5'-P-CCNC: 18 U/L (ref 10–44)
ANION GAP SERPL CALC-SCNC: 19 MMOL/L (ref 8–16)
AST SERPL-CCNC: 42 U/L (ref 10–40)
BASOPHILS # BLD AUTO: 0.04 K/UL (ref 0–0.2)
BASOPHILS NFR BLD: 0.6 % (ref 0–1.9)
BILIRUB SERPL-MCNC: 0.8 MG/DL (ref 0.1–1)
BUN SERPL-MCNC: 14 MG/DL (ref 8–23)
CALCIUM SERPL-MCNC: 9.4 MG/DL (ref 8.7–10.5)
CHLORIDE SERPL-SCNC: 101 MMOL/L (ref 95–110)
CO2 SERPL-SCNC: 20 MMOL/L (ref 23–29)
CREAT SERPL-MCNC: 1 MG/DL (ref 0.5–1.4)
DIFFERENTIAL METHOD: ABNORMAL
EOSINOPHIL # BLD AUTO: 0.1 K/UL (ref 0–0.5)
EOSINOPHIL NFR BLD: 1 % (ref 0–8)
ERYTHROCYTE [DISTWIDTH] IN BLOOD BY AUTOMATED COUNT: 15.3 % (ref 11.5–14.5)
EST. GFR  (NO RACE VARIABLE): >60 ML/MIN/1.73 M^2
GLUCOSE SERPL-MCNC: 54 MG/DL (ref 70–110)
HCT VFR BLD AUTO: 54.3 % (ref 40–54)
HGB BLD-MCNC: 18.3 G/DL (ref 14–18)
IMM GRANULOCYTES # BLD AUTO: 0.01 K/UL (ref 0–0.04)
IMM GRANULOCYTES NFR BLD AUTO: 0.1 % (ref 0–0.5)
LYMPHOCYTES # BLD AUTO: 1.3 K/UL (ref 1–4.8)
LYMPHOCYTES NFR BLD: 17.4 % (ref 18–48)
MAGNESIUM SERPL-MCNC: 1.9 MG/DL (ref 1.6–2.6)
MCH RBC QN AUTO: 30.8 PG (ref 27–31)
MCHC RBC AUTO-ENTMCNC: 33.7 G/DL (ref 32–36)
MCV RBC AUTO: 91 FL (ref 82–98)
MONOCYTES # BLD AUTO: 0.7 K/UL (ref 0.3–1)
MONOCYTES NFR BLD: 9.4 % (ref 4–15)
NEUTROPHILS # BLD AUTO: 5.2 K/UL (ref 1.8–7.7)
NEUTROPHILS NFR BLD: 71.5 % (ref 38–73)
NRBC BLD-RTO: 0 /100 WBC
PHOSPHATE SERPL-MCNC: 3.1 MG/DL (ref 2.7–4.5)
PLATELET # BLD AUTO: 222 K/UL (ref 150–450)
PMV BLD AUTO: 10.7 FL (ref 9.2–12.9)
POCT GLUCOSE: 50 MG/DL (ref 70–110)
POCT GLUCOSE: 67 MG/DL (ref 70–110)
POCT GLUCOSE: 84 MG/DL (ref 70–110)
POCT GLUCOSE: 94 MG/DL (ref 70–110)
POTASSIUM SERPL-SCNC: 4.3 MMOL/L (ref 3.5–5.1)
PROT SERPL-MCNC: 7.2 G/DL (ref 6–8.4)
RBC # BLD AUTO: 5.95 M/UL (ref 4.6–6.2)
SODIUM SERPL-SCNC: 140 MMOL/L (ref 136–145)
WBC # BLD AUTO: 7.26 K/UL (ref 3.9–12.7)

## 2023-02-03 PROCEDURE — 83735 ASSAY OF MAGNESIUM: CPT | Performed by: NURSE PRACTITIONER

## 2023-02-03 PROCEDURE — 99233 SBSQ HOSP IP/OBS HIGH 50: CPT | Mod: ,,, | Performed by: PSYCHIATRY & NEUROLOGY

## 2023-02-03 PROCEDURE — 94640 AIRWAY INHALATION TREATMENT: CPT

## 2023-02-03 PROCEDURE — 92507 TX SP LANG VOICE COMM INDIV: CPT

## 2023-02-03 PROCEDURE — 25000003 PHARM REV CODE 250: Performed by: NURSE PRACTITIONER

## 2023-02-03 PROCEDURE — 85025 COMPLETE CBC W/AUTO DIFF WBC: CPT | Performed by: NURSE PRACTITIONER

## 2023-02-03 PROCEDURE — 92526 ORAL FUNCTION THERAPY: CPT

## 2023-02-03 PROCEDURE — 25000242 PHARM REV CODE 250 ALT 637 W/ HCPCS: Performed by: NURSE PRACTITIONER

## 2023-02-03 PROCEDURE — 99233 PR SUBSEQUENT HOSPITAL CARE,LEVL III: ICD-10-PCS | Mod: ,,, | Performed by: PSYCHIATRY & NEUROLOGY

## 2023-02-03 PROCEDURE — 25000003 PHARM REV CODE 250: Performed by: PHYSICIAN ASSISTANT

## 2023-02-03 PROCEDURE — 11000001 HC ACUTE MED/SURG PRIVATE ROOM

## 2023-02-03 PROCEDURE — S4991 NICOTINE PATCH NONLEGEND: HCPCS | Performed by: NURSE PRACTITIONER

## 2023-02-03 PROCEDURE — 99900035 HC TECH TIME PER 15 MIN (STAT)

## 2023-02-03 PROCEDURE — 63600175 PHARM REV CODE 636 W HCPCS: Performed by: NURSE PRACTITIONER

## 2023-02-03 PROCEDURE — 84100 ASSAY OF PHOSPHORUS: CPT | Performed by: NURSE PRACTITIONER

## 2023-02-03 PROCEDURE — 80053 COMPREHEN METABOLIC PANEL: CPT | Performed by: NURSE PRACTITIONER

## 2023-02-03 PROCEDURE — 51798 US URINE CAPACITY MEASURE: CPT

## 2023-02-03 PROCEDURE — 94761 N-INVAS EAR/PLS OXIMETRY MLT: CPT

## 2023-02-03 PROCEDURE — 20600001 HC STEP DOWN PRIVATE ROOM

## 2023-02-03 PROCEDURE — 25000003 PHARM REV CODE 250: Performed by: PSYCHIATRY & NEUROLOGY

## 2023-02-03 RX ORDER — CLOPIDOGREL BISULFATE 75 MG/1
75 TABLET ORAL DAILY
Status: DISCONTINUED | OUTPATIENT
Start: 2023-02-04 | End: 2023-02-06 | Stop reason: HOSPADM

## 2023-02-03 RX ORDER — LEVETIRACETAM 500 MG/1
500 TABLET ORAL 2 TIMES DAILY
Status: DISCONTINUED | OUTPATIENT
Start: 2023-02-03 | End: 2023-02-06 | Stop reason: HOSPADM

## 2023-02-03 RX ORDER — SILODOSIN 4 MG/1
4 CAPSULE ORAL ONCE
Status: COMPLETED | OUTPATIENT
Start: 2023-02-03 | End: 2023-02-03

## 2023-02-03 RX ORDER — SILODOSIN 4 MG/1
8 CAPSULE ORAL DAILY
Status: DISCONTINUED | OUTPATIENT
Start: 2023-02-04 | End: 2023-02-06 | Stop reason: HOSPADM

## 2023-02-03 RX ADMIN — ASPIRIN 81 MG: 81 TABLET, CHEWABLE ORAL at 09:02

## 2023-02-03 RX ADMIN — LEVETIRACETAM 500 MG: 100 INJECTION, SOLUTION INTRAVENOUS at 09:02

## 2023-02-03 RX ADMIN — IPRATROPIUM BROMIDE AND ALBUTEROL SULFATE 3 ML: .5; 3 SOLUTION RESPIRATORY (INHALATION) at 12:02

## 2023-02-03 RX ADMIN — HEPARIN SODIUM 5000 UNITS: 5000 INJECTION INTRAVENOUS; SUBCUTANEOUS at 02:02

## 2023-02-03 RX ADMIN — NICOTINE 1 PATCH: 14 PATCH, EXTENDED RELEASE TRANSDERMAL at 09:02

## 2023-02-03 RX ADMIN — SILODOSIN 4 MG: 4 CAPSULE ORAL at 09:02

## 2023-02-03 RX ADMIN — ATORVASTATIN CALCIUM 40 MG: 40 TABLET, FILM COATED ORAL at 09:02

## 2023-02-03 RX ADMIN — ACETAMINOPHEN 500 MG: 500 TABLET ORAL at 02:02

## 2023-02-03 RX ADMIN — SENNOSIDES AND DOCUSATE SODIUM 1 TABLET: 50; 8.6 TABLET ORAL at 09:02

## 2023-02-03 RX ADMIN — SILODOSIN 4 MG: 4 CAPSULE ORAL at 10:02

## 2023-02-03 RX ADMIN — HEPARIN SODIUM 5000 UNITS: 5000 INJECTION INTRAVENOUS; SUBCUTANEOUS at 09:02

## 2023-02-03 RX ADMIN — QUETIAPINE FUMARATE 25 MG: 25 TABLET ORAL at 09:02

## 2023-02-03 RX ADMIN — MUPIROCIN: 20 OINTMENT TOPICAL at 09:02

## 2023-02-03 RX ADMIN — IPRATROPIUM BROMIDE AND ALBUTEROL SULFATE 3 ML: .5; 3 SOLUTION RESPIRATORY (INHALATION) at 08:02

## 2023-02-03 RX ADMIN — LEVETIRACETAM 500 MG: 500 TABLET, FILM COATED ORAL at 09:02

## 2023-02-03 NOTE — ASSESSMENT & PLAN NOTE
- VN following   - No TNK and not a candidate for intervention per VN  - MRI brain Extensive foci of diffusion restriction in the right MCA distribution as above, consistent acute right MCA territory infarction.  No evidence of hemorrhagic conversion.  - CTA: Acute right MCA distribution infarct.  Concern for small thrombus with stenosis and or diminished flow involving the inferior branch of the M2 segment of the right MCA. Remote left caudate head lacunar infarct.  - SBP Goal < 220  - Q 1 vitals   - Q 1 neuro checks   - TSH, A1c, lipid, echo and EKG  - Atorvastatin Daily   - Aspirin, SQ heparin   - PT/OT/SLP eval and treat  - [employer] Diet started per SLP  - Possible SD to stroke team  2/3/2023: stepdown to stroke team

## 2023-02-03 NOTE — SUBJECTIVE & OBJECTIVE
Review of Systems  Constitutional: Denies fevers, weight loss, chills, or weakness.  Eyes: Denies changes in vision.  ENT: Denies dysphagia, nasal discharge, ear pain or discharge.  Cardiovascular: Denies chest pain, palpitations, orthopnea, or claudication.  Respiratory: Denies shortness of breath, cough, hemoptysis, or wheezing.  GI: Denies nausea/vomitting, hematochezia, melena, abd pain, or changes in appetite.  : Denies dysuria, incontinence, or hematuria.  Musculoskeletal: Denies joint pain or myalgias.  Skin/breast: Denies rashes, lumps, lesions, or discharge.  Neurologic: Denies headache, dizziness, vertigo, or paresthesias.  Psychiatric: Denies changes in mood or hallucinations.  Endocrine: Denies polyuria, polydipsia, heat/cold intolerance.  Hematologic/Lymph: Denies lymphadenopathy, easy bruising or easy bleeding.  Allergic/Immunologic: Denies rash, rhinitis.    Objective:     Vitals:  Temp: 97.7 °F (36.5 °C)  Pulse: 97  Rhythm: normal sinus rhythm  BP: (!) 145/83  MAP (mmHg): 106  Resp: (!) 38  SpO2: 97 %    Temp  Min: 97.7 °F (36.5 °C)  Max: 98.3 °F (36.8 °C)  Pulse  Min: 63  Max: 124  BP  Min: 134/64  Max: 205/93  MAP (mmHg)  Min: 92  Max: 157  Resp  Min: 16  Max: 39  SpO2  Min: 91 %  Max: 100 %    02/02 0701 - 02/03 0700  In: 1074 [P.O.:270; I.V.:804]  Out: 600 [Urine:600]   Unmeasured Output  Urine Occurrence: 1  Stool Occurrence: 0  Pad Count: 1       Physical Exam  GA: comfortable, no acute distress.   HEENT: No scleral icterus or JVD.   Pulmonary: Clear to auscultation A/L.   Cardiac: RRR S1 & S2 w/o rubs/murmurs/gallops.   Abdominal: Bowel sounds present x 4. No appreciable hepatosplenomegaly.  Skin: No jaundice, rashes, or visible lesions.  Neuro:  --GCS: E4 V5 M6  --Mental Status:  awake, oriented X4, follows commands  --CN II-XII grossly intact.   --Pupils 2mm, PERRL.   --Corneal reflex, gag, cough intact.  --QUINN spont    Medications:  Continuoussodium chloride 0.9%, Last Rate: Stopped  (02/02/23 2220)  sodium chloride 0.9%    Scheduledalbuterol-ipratropium, 3 mL, Q6H  aspirin, 81 mg, Daily  atorvastatin, 40 mg, Daily  heparin (porcine), 5,000 Units, Q8H  levetiracetam IV, 500 mg, Q12H  mupirocin, , BID  nicotine, 1 patch, Daily  QUEtiapine, 25 mg, BID  senna-docusate 8.6-50 mg, 1 tablet, BID  [START ON 2/4/2023] silodosin, 8 mg, Daily    PRNacetaminophen, 500 mg, Q6H PRN  haloperidol lactate, 5 mg, Q6H PRN  ondansetron, 4 mg, Q8H PRN  sodium chloride 0.9%, 500 mL, Continuous PRN  sodium chloride 0.9%, 10 mL, PRN  sodium chloride 0.9%, 10 mL, PRN      Today I personally reviewed pertinent medications, lines/drains/airways, imaging, cardiology results, laboratory results, microbiology results,     Diet  Diet Dysphagia Mechanical Soft (IDDSI Level 5)

## 2023-02-03 NOTE — ASSESSMENT & PLAN NOTE
Stroke risk factor  Patient hypertensive as high as 245/121 on admission.  Was on cardene, stopped on 2/1 at 0843.  He is not noted to be on any BP meds at home.  SBP goal at this time < 220   Consider starting amlodipine 5mg over the next 24-48h

## 2023-02-03 NOTE — SUBJECTIVE & OBJECTIVE
Neurologic Chief Complaint: LUE weakness and numbness    Subjective:     Interval History: Patient is seen for follow-up neurological assessment and treatment recommendations:     Patient with urinary retention overnight, silodosin initiated. Patient required Haldol injections overnight. Seroquel scheduled. Patient is only on ASA at this time. Recommend DAPT. Etiology ESUS at this time. Will need cardiac event monitor at discharge. Therapy with recs for IPR and minced & moist diet with thin liquids.     HPI, Past Medical, Family, and Social History remains the same as documented in the initial encounter.     Review of Systems   Constitutional:  Negative for fever.   HENT:  Negative for drooling and trouble swallowing.    Respiratory:  Negative for cough.    Gastrointestinal:  Negative for vomiting.   Genitourinary:  Positive for difficulty urinating.   Neurological:  Negative for weakness.   Psychiatric/Behavioral:  Positive for agitation.    Scheduled Meds:   albuterol-ipratropium  3 mL Nebulization Q6H    aspirin  81 mg Oral Daily    atorvastatin  40 mg Oral Daily    heparin (porcine)  5,000 Units Subcutaneous Q8H    levETIRAcetam  500 mg Oral BID    mupirocin   Nasal BID    nicotine  1 patch Transdermal Daily    QUEtiapine  25 mg Oral BID    senna-docusate 8.6-50 mg  1 tablet Oral BID    [START ON 2/4/2023] silodosin  8 mg Oral Daily     Continuous Infusions:   sodium chloride 0.9% Stopped (02/02/23 2220)    sodium chloride 0.9%       PRN Meds:acetaminophen, haloperidol lactate, ondansetron, sodium chloride 0.9%, sodium chloride 0.9%, sodium chloride 0.9%    Objective:     Vital Signs (Most Recent):  Temp: 97.7 °F (36.5 °C) (02/03/23 1101)  Pulse: 97 (02/03/23 1401)  Resp: (!) 22 (02/03/23 1401)  BP: (!) 147/99 (02/03/23 1401)  SpO2: 97 % (02/03/23 1401)  BP Location: Right arm    Vital Signs Range (Last 24H):  Temp:  [97.7 °F (36.5 °C)-98.3 °F (36.8 °C)]   Pulse:  []   Resp:  [16-39]   BP:  (134-205)/()   SpO2:  [91 %-100 %]   BP Location: Right arm    Physical Exam  Vitals and nursing note reviewed.   HENT:      Head: Normocephalic and atraumatic.      Nose: No rhinorrhea.   Eyes:      General: No scleral icterus.  Cardiovascular:      Rate and Rhythm: Normal rate.   Pulmonary:      Effort: Pulmonary effort is normal. No respiratory distress.   Musculoskeletal:         General: No tenderness.   Skin:     General: Skin is warm and dry.   Neurological:      Mental Status: He is alert.      Motor: Weakness present.      Comments: Moves all extremities spontaneously   Follows commands   Alert and oriented   Poor participation effort 2/2 agitation        Neurological Exam:   LOC: alert  Attention Span: poor  Language: No aphasia  Articulation: Dysarthria  Orientation: Not oriented to time  EOM (CN III, IV, VI): Full/intact  Facial Movement (CN VII): Symmetric facial expression    Motor: Moves all extremities spontaneously and antigravity; strength testing limited 2/2 poor participation and agitation   Sensation: Intact to light touch     Laboratory:  CMP:   Recent Labs   Lab 02/03/23  0506   CALCIUM 9.4   ALBUMIN 3.9   PROT 7.2      K 4.3   CO2 20*      BUN 14   CREATININE 1.0   ALKPHOS 114   ALT 18   AST 42*   BILITOT 0.8     BMP:   Recent Labs   Lab 02/03/23  0506      K 4.3      CO2 20*   BUN 14   CREATININE 1.0   CALCIUM 9.4     CBC:   Recent Labs   Lab 02/03/23  0506   WBC 7.26   RBC 5.95   HGB 18.3*   HCT 54.3*      MCV 91   MCH 30.8   MCHC 33.7     Lipid Panel:   Recent Labs   Lab 01/31/23  0936   CHOL 176   LDLCALC 108.2   HDL 55   TRIG 64     Coagulation: No results for input(s): PT, INR, APTT in the last 168 hours.  Platelet Aggregation Study: No results for input(s): PLTAGG, PLTAGINTERP, PLTAGREGLACO, ADPPLTAGGREG in the last 168 hours.  Hgb A1C:   Recent Labs   Lab 01/31/23  0845   HGBA1C 4.8     TSH:   Recent Labs   Lab 01/31/23  0845   TSH 2.132        Diagnostic Results     Brain Imaging   MRI Brain WO Contrast 1/31/23     Extensive foci of diffusion restriction in the right MCA distribution as above, consistent acute right MCA territory infarction.  No evidence of hemorrhagic conversion. Changes of chronic small vessel ischemic disease and cerebral volume loss.    CTH 1/31/23   1. Ill-defined areas of parenchymal hypoattenuation within the right temporoparietal region most concerning for age-indeterminate infarcts.   2. Suspected remote infarcts of the posterior right occipital lobe and bilateral basal ganglia.  3. Chronic microvascular ischemic change.    Vessel Imaging   CTA H/N 1/31/23   Acute right MCA distribution infarct.  Concern for small thrombus with stenosis and or diminished flow involving the inferior branch of the M2 segment of the right MCA. Remote left caudate head lacunar infarct. Bullous emphysematous changes.    US Carotid BL 1/31/23   Abnormal examination with elevated peak systolic velocity within the right internal carotid artery.  There is also a high resistance waveform within the right common and internal carotid arteries.  Note that the patient was moving during the examination.     Cardiac Imaging   TTE 2/1/23   The left ventricle is normal in size with concentric remodeling and normal systolic function.  The estimated ejection fraction is 68%.  Normal left ventricular diastolic function.  Normal right ventricular size with normal right ventricular systolic function.  Mechanically ventilated; cannot use inferior caval vein diameter to estimate central venous pressure.    TTE 1/31/23   There is no evidence of intracardiac shunting.  The left ventricle is normal in size with concentric remodeling and normal systolic function.  The estimated ejection fraction is 65%.  Normal left ventricular diastolic function.  Normal right ventricular size with normal right ventricular systolic function.  Normal central venous pressure (3  mmHg).  The estimated PA systolic pressure is 7 mmHg.  There is no pulmonary hypertension.

## 2023-02-03 NOTE — NURSING
Pt c/o pain in penis r/t urinating. Bladder scan completed per orders with a reading of 390 cc.  In/out cath performed with a return of 375 cc of dark surya urine.  Pt tolerated well. Reports pain is relieved following cath.

## 2023-02-03 NOTE — PROGRESS NOTES
[facility] - Neuro Critical Care  Vascular Neurology  [facility]  Progress Note    Assessment/Plan:     * Embolic stroke involving right middle cerebral artery  Patient is a 78yo M with PMH of smoking, asthma, and BPH. He presented as a transfer from the [facility] for R MCA infarct and encephalopathy. Patient went to ED at  for LUE weakness and numbness for the past 2 weeks. He reported having difficulty walking at home. Patient required intubation due to hypoxia at OSH prior to arrival. On arrival to [facility] NIHSS 26. CTA with small thrombus with stenosis/diminshed flow through R M2. No intervention. Patient admitted to [facility]. MRI with R temporal, parietal, posterior frontal, and subinsular cortex infarcts. TTE unremarkable. He was admitted to [facility] and later extubated.     Etiology at this time ESUS. Will need 30d cardiac event monitor at discharge. Therapy recommending IPR and minced & moist diet with thin liquids. Patient pending step down to NPU. Okay with stepping down to NPU based off of patient's current clinical picture. Should any deterioration of patient's status occur, please notify the stroke team prior to transferring patient to floor. Recommend starting DAPT.     Antithrombotics for secondary stroke prevention: Antiplatelets: Aspirin: 81 mg daily and Clopidogrel: 75 mg daily     Statins for secondary stroke prevention and hyperlipidemia, if present:   Statins: Atorvastatin- 40 mg daily    Aggressive risk factor modification: HTN, HLD     Rehab efforts: IPR; Minced & Moist with thin liquids     Diagnostics ordered/pending: None     VTE prophylaxis: Heparin 5000 units SQ every 8 hours    BP parameters: Infarct: No intervention, SBP <220        Urinary retention  Silodosin started   If persists consider ramirez placement and urology consult   Hx of BPH     Urinary tract infection with hematuria  -UA with UTI on 2/1. Urine culture negative/no growth       Seizure  On Keppra 500mg BID   EEG  negative   Will continue to monitor     Encephalopathy  Has been getting seroquel 25mg BID and haldol PRN while in [facility]      HTN (hypertension)  Stroke risk factor  Patient hypertensive as high as 245/121 on admission.  Was on cardene, stopped on 2/1 at 0843.  He is not noted to be on any BP meds at home.  SBP goal at this time < 220   Consider starting amlodipine 5mg over the next 24-48h      Cytotoxic brain edema  Area of cytotoxic cerebral edema identified when reviewing brain imaging. We will continue to monitor the patients clinical exam for any worsening of symptoms which may indicate expansion of the stroke or the area of the edema resulting in the clinical change. The pattern is suggestive of embolic etiology. Continue to check neuro exam q1h while in NCC and q4h when on NPU. Repeat CTH PRN for acute neuro exam changes.     Stable at this time but will continue to monitor       Tobacco abuse  Stroke risk factor  - on smoking cessation once appropriate       2/2/2023 Extubated yesterday (2/1).  Tolerating well.  Off Precedex since 0900 today, redirectable.  Therapy recommending IPR.  Awaiting stepdown to NPU.  02/03/2023 Patient with urinary retention overnight, silodosin initiated. Patient required Haldol injections overnight. Seroquel scheduled. Patient is only on ASA at this time. Recommend DAPT. Etiology ESUS at this time. Will need cardiac event monitor at discharge. Therapy with recs for IPR and minced & moist diet with thin liquids.       STROKE DOCUMENTATION   Acute Stroke Times   Stroke Team Called Date: 01/31/23  Stroke Team Called Time: 2230  Stroke Team Arrival Date: 01/31/23  Stroke Team Arrival Time: 2235  CT Interpretation Time: 2225 (done at OSH)  Thrombolytic Therapy Recommended: No  CTA Interpretation Time: 2225 (done at OSH)  Thrombectomy Recommended: No  MRI Acute Stroke Protocol Interpretation Time: 2225 (done at OSH)    NIH Scale:  1a. Level of Consciousness: 1-->Not alert, but  arousable by minor stimulation to obey, answer, or respond  1b. LOC Questions: 0-->Answers both questions correctly  1c. LOC Commands: 0-->Performs both tasks correctly  2. Best Gaze: 1-->Partial gaze palsy, gaze is abnormal in one or both eyes, but forced deviation or total gaze paresis is not present  3. Visual: 1-->Partial hemianopia  4. Facial Palsy: 0-->Normal symmetrical movements  5a. Motor Arm, Left: 1-->Drift, limb holds 90 (or 45) degrees, but drifts down before full 10 seconds, does not hit bed or other support  5b. Motor Arm, Right: 1-->Drift, limb holds 90 (or 45) degrees, but drifts down before full 10 secs, does not hit bed or other support  6a. Motor Leg, Left: 1-->Drift, leg falls by the end of the 5-sec period but does not hit bed  6b. Motor Leg, Right: 1-->Drift, leg falls by the end of the 5-sec period but does not hit bed  7. Limb Ataxia: 0-->Absent  8. Sensory: 0-->Normal, no sensory loss  9. Best Language: 0-->No aphasia, normal  10. Dysarthria: 1-->Mild-to-moderate dysarthria, patient slurs at least some words and, at worst, can be understood with some difficulty  11. Extinction and Inattention (formerly Neglect): 0-->No abnormality  Total (NIH Stroke Scale): 8       Modified Valencia Score: 1  Daniel Coma Scale:    ABCD2 Score:    [id number] Score:   HAS -BLED Score:   ICH Score:   Hunt & Sierra Classification:      Hemorrhagic change of an Ischemic Stroke: Does this patient have an ischemic stroke with hemorrhagic changes? No     Neurologic Chief Complaint: LUE weakness and numbness    Subjective:     Interval History: Patient is seen for follow-up neurological assessment and treatment recommendations:     Patient with urinary retention overnight, silodosin initiated. Patient required Haldol injections overnight. Seroquel scheduled. Patient is only on ASA at this time. Recommend DAPT. Etiology ESUS at this time. Will need cardiac event monitor at discharge. Therapy with recs for IPR and  minced & moist diet with thin liquids.     HPI, Past Medical, Family, and Social History remains the same as documented in the initial encounter.     Review of Systems   Constitutional:  Negative for fever.   HENT:  Negative for drooling and trouble swallowing.    Respiratory:  Negative for cough.    Gastrointestinal:  Negative for vomiting.   Genitourinary:  Positive for difficulty urinating.   Neurological:  Negative for weakness.   Psychiatric/Behavioral:  Positive for agitation.    Scheduled Meds:   albuterol-ipratropium  3 mL Nebulization Q6H    aspirin  81 mg Oral Daily    atorvastatin  40 mg Oral Daily    heparin (porcine)  5,000 Units Subcutaneous Q8H    levETIRAcetam  500 mg Oral BID    mupirocin   Nasal BID    nicotine  1 patch Transdermal Daily    QUEtiapine  25 mg Oral BID    senna-docusate 8.6-50 mg  1 tablet Oral BID    [START ON 2/4/2023] silodosin  8 mg Oral Daily     Continuous Infusions:   sodium chloride 0.9% Stopped (02/02/23 2220)    sodium chloride 0.9%       PRN Meds:acetaminophen, haloperidol lactate, ondansetron, sodium chloride 0.9%, sodium chloride 0.9%, sodium chloride 0.9%    Objective:     Vital Signs (Most Recent):  Temp: 97.7 °F (36.5 °C) (02/03/23 1101)  Pulse: 97 (02/03/23 1401)  Resp: (!) 22 (02/03/23 1401)  BP: (!) 147/99 (02/03/23 1401)  SpO2: 97 % (02/03/23 1401)  BP Location: Right arm    Vital Signs Range (Last 24H):  Temp:  [97.7 °F (36.5 °C)-98.3 °F (36.8 °C)]   Pulse:  []   Resp:  [16-39]   BP: (134-205)/()   SpO2:  [91 %-100 %]   BP Location: Right arm    Physical Exam  Vitals and nursing note reviewed.   HENT:      Head: Normocephalic and atraumatic.      Nose: No rhinorrhea.   Eyes:      General: No scleral icterus.  Cardiovascular:      Rate and Rhythm: Normal rate.   Pulmonary:      Effort: Pulmonary effort is normal. No respiratory distress.   Musculoskeletal:         General: No tenderness.   Skin:     General: Skin is warm and dry.    Neurological:      Mental Status: He is alert.      Motor: Weakness present.      Comments: Moves all extremities spontaneously   Follows commands   Alert and oriented   Poor participation effort 2/2 agitation        Neurological Exam:   LOC: alert  Attention Span: poor  Language: No aphasia  Articulation: Dysarthria  Orientation: Not oriented to time  EOM (CN III, IV, VI): Full/intact  Facial Movement (CN VII): Symmetric facial expression    Motor: Moves all extremities spontaneously and antigravity; strength testing limited 2/2 poor participation and agitation   Sensation: Intact to light touch     Laboratory:  CMP:   Recent Labs   Lab 02/03/23  0506   CALCIUM 9.4   ALBUMIN 3.9   PROT 7.2      K 4.3   CO2 20*      BUN 14   CREATININE 1.0   ALKPHOS 114   ALT 18   AST 42*   BILITOT 0.8     BMP:   Recent Labs   Lab 02/03/23  0506      K 4.3      CO2 20*   BUN 14   CREATININE 1.0   CALCIUM 9.4     CBC:   Recent Labs   Lab 02/03/23  0506   WBC 7.26   RBC 5.95   HGB 18.3*   HCT 54.3*      MCV 91   MCH 30.8   MCHC 33.7     Lipid Panel:   Recent Labs   Lab 01/31/23  0936   CHOL 176   LDLCALC 108.2   HDL 55   TRIG 64     Coagulation: No results for input(s): PT, INR, APTT in the last 168 hours.  Platelet Aggregation Study: No results for input(s): PLTAGG, PLTAGINTERP, PLTAGREGLACO, ADPPLTAGGREG in the last 168 hours.  Hgb A1C:   Recent Labs   Lab 01/31/23  0845   HGBA1C 4.8     TSH:   Recent Labs   Lab 01/31/23  0845   TSH 2.132       Diagnostic Results     Brain Imaging   MRI Brain WO Contrast 1/31/23     Extensive foci of diffusion restriction in the right MCA distribution as above, consistent acute right MCA territory infarction.  No evidence of hemorrhagic conversion. Changes of chronic small vessel ischemic disease and cerebral volume loss.    CTH 1/31/23   1. Ill-defined areas of parenchymal hypoattenuation within the right temporoparietal region most concerning for age-indeterminate  infarcts.   2. Suspected remote infarcts of the posterior right occipital lobe and bilateral basal ganglia.  3. Chronic microvascular ischemic change.    Vessel Imaging   CTA H/N 1/31/23   Acute right MCA distribution infarct.  Concern for small thrombus with stenosis and or diminished flow involving the inferior branch of the M2 segment of the right MCA. Remote left caudate head lacunar infarct. Bullous emphysematous changes.    US Carotid BL 1/31/23   Abnormal examination with elevated peak systolic velocity within the right internal carotid artery.  There is also a high resistance waveform within the right common and internal carotid arteries.  Note that the patient was moving during the examination.     Cardiac Imaging   TTE 2/1/23    The left ventricle is normal in size with concentric remodeling and normal systolic function.   The estimated ejection fraction is 68%.   Normal left ventricular diastolic function.   Normal right ventricular size with normal right ventricular systolic function.   Mechanically ventilated; cannot use inferior caval vein diameter to estimate central venous pressure.    TTE 1/31/23    There is no evidence of intracardiac shunting.   The left ventricle is normal in size with concentric remodeling and normal systolic function.   The estimated ejection fraction is 65%.   Normal left ventricular diastolic function.   Normal right ventricular size with normal right ventricular systolic function.   Normal central venous pressure (3 mmHg).   The estimated PA systolic pressure is 7 mmHg.   There is no pulmonary hypertension.      Humberto Beaver PA-C  [facility]  Department of Vascular Neurology   [facility] - Neuro Critical Care

## 2023-02-03 NOTE — PT/OT/SLP PROGRESS
"Speech Language Pathology Treatment    Patient Name:  Gabriel Springer   MRN:  [id number]  Admitting Diagnosis: Acute right MCA stroke    Recommendations:                 General Recommendations:  Dysphagia therapy, Speech/language therapy, and Cognitive-linguistic therapy  Diet recommendations:  Mechanical soft, Liquid Diet Level: Thin   Aspiration Precautions: Assistance with meals, Check for pocketing/oral residue, Feed only when awake/alert, HOB to 90 degrees, Meds whole 1 at a time, Small bites/sips, and Strict aspiration precautions   General Precautions: Standard, aspiration, fall  Communication strategies:  none    Subjective     "I dont get any sleep" per pt  Patient goals: home     Pain/Comfort:  Pain Rating 1: 0/10  Pain Rating Post-Intervention 1: 0/10    Respiratory Status: Room air    Objective:     Has the patient been evaluated by SLP for swallowing?   Yes  Keep patient NPO? No   Current Respiratory Status:        Pt. Seen at bedside and was sleepy but did rouse when stimulated.  HOB was raised to 90 degree angle and was observed swallowing one pill, 4 ounces of water via cup and 1/2 cracker.  No coughing, throat clearing or change in vocal quality noted following the swallow however oral residue was noted following cracker with liquid wash needed to clear oral cavity.  Cognitively, He was oriented to place, year and time of day as well as situation.  No carry over of orientation noted from beginning to end of session.  Left inattention noted with poor eye contact.  Towards end of session, pt. Required cues to maintain alertness.  Mr. Springer responded to category exclusion tasks in field of 4 with 70% accuracy with repetition needed as well as delays in responding and min cues.  He responded to functional math and time calculations with 80% accuracy with min cues/repetition needed.  Ongoing education provided re role of slp, safe swallow strategies and rehab plan of care.   Assessment:     Gabriel Springer is a 77 " y.o. male with an SLP diagnosis of Dysphagia, Cognitive-Linguistic Impairment, and Visio-Spatial Impairment.    Goals:   Multidisciplinary Problems       SLP Goals          Problem: SLP    Goal Priority Disciplines Outcome   SLP Goal     SLP Ongoing, Progressing   Description: Speech Language Pathology Goals  Goals expected to be met by 2/9:  1. Pt will participate in ongoing assessment of swallow function to determine safest and least restrictive diet.   2. Pt will participate in visuospatial tasks targeting L intattention with 75% acc given mod cues.  3. Pt will orient to place/time concepts with 75% acc given min-mod cues.  4. Pt will participate in mod level problem solving/reasoning tasks with 75% acc given mod cues.  5. Pt will participate in ongoing assessment of reading/writing skills to determine future therapeutic plan of care.                        Plan:     Patient to be seen:  4 x/week   Plan of Care expires:  03/03/23  Plan of Care reviewed with:  patient   SLP Follow-Up:  Yes       Discharge recommendations:  rehabilitation facility   Barriers to Discharge:  None    Time Tracking:     SLP Treatment Date:   02/03/23  Speech Start Time:  1030  Speech Stop Time:  1048     Speech Total Time (min):  18 min    Billable Minutes: Speech Therapy Individual 10 and Treatment Swallowing Dysfunction 8    02/03/2023

## 2023-02-03 NOTE — ASSESSMENT & PLAN NOTE
Area of cytotoxic cerebral edema identified when reviewing brain imaging. We will continue to monitor the patients clinical exam for any worsening of symptoms which may indicate expansion of the stroke or the area of the edema resulting in the clinical change. The pattern is suggestive of embolic etiology. Continue to check neuro exam q1h while in NCC and q4h when on NPU. Repeat CTH PRN for acute neuro exam changes.     Stable at this time but will continue to monitor

## 2023-02-03 NOTE — ASSESSMENT & PLAN NOTE
Patient is a 76yo M with PMH of smoking, asthma, and BPH. He presented as a transfer from the [facility] for R MCA infarct and encephalopathy. Patient went to ED at  for LUE weakness and numbness for the past 2 weeks. He reported having difficulty walking at home. Patient required intubation due to hypoxia at OSH prior to arrival. On arrival to [facility] NIHSS 26. CTA with small thrombus with stenosis/diminshed flow through R M2. No intervention. Patient admitted to [facility]. MRI with R temporal, parietal, posterior frontal, and subinsular cortex infarcts. TTE unremarkable. He was admitted to [facility] and later extubated.     Etiology at this time ESUS. Will need 30d cardiac event monitor at discharge. Therapy recommending IPR and minced & moist diet with thin liquids. Patient pending step down to NPU. Okay with stepping down to NPU based off of patient's current clinical picture. Should any deterioration of patient's status occur, please notify the stroke team prior to transferring patient to floor. Recommend starting DAPT.     Antithrombotics for secondary stroke prevention: Antiplatelets: Aspirin: 81 mg daily and Clopidogrel: 75 mg daily     Statins for secondary stroke prevention and hyperlipidemia, if present:   Statins: Atorvastatin- 40 mg daily    Aggressive risk factor modification: HTN, HLD     Rehab efforts: IPR; Minced & Moist with thin liquids     Diagnostics ordered/pending: None     VTE prophylaxis: Heparin 5000 units SQ every 8 hours    BP parameters: Infarct: No intervention, SBP <220

## 2023-02-03 NOTE — NURSING TRANSFER
Nursing Transfer Note      2/3/2023     Reason patient is being transferred: transfer orders     Transfer To: [facility] From: [id number]    Transfer via bed    Transfer with N/A    Transported by JAMEEL Tomas RN    Medicines sent: yes    Any special needs or follow-up needed: none    Chart send with patient: Yes    Notified: Pt's daughter    Patient reassessed at: 1742 02/03/2023    Upon arrival to floor: patient oriented to room, call bell in reach, and bed in lowest position. Pt taken to room by RN. No belongings were with the patient. Report at the bedside given to JEFFRY Abreu. Pt Aox4 upon arrival to the [facility] room. Pt resting comfortably in the bed.

## 2023-02-03 NOTE — PROGRESS NOTES
[facility] - Neuro Critical Care  Neurocritical Care  Progress Note    Admit Date: 1/31/2023  Service Date: 02/03/2023  Length of Stay: 3    Subjective:     Chief Complaint: Acute right MCA stroke    History of Present Illness: Gabriel Springer 77 y.o. male with BPH, tobacco abuse, and asthma who presents to [facility] for R MCA infarct and encephalopathy. He presented to OSH with a chief complaint of left arm weakness.  He reports 2 weeks of left arm weakness and numbness that has been constant without aggravating alleviating factors.  He attempted no treatment at home.  He finds he is unable to pick things up with his left hand.  He denies any slurred speech or difficulty swallowing and no numbness of his legs. He finds he has been having trouble walking at home with increasing stumbling. While in ED OSH he had AMS and became hypoxic requiring intubation. There was concern for seizure. He was given keppra 3 gm and ativan 2mg. He is being admitted to [facility] for a higher level of care. History from chart due to LOC and intubation.       Hospital Course: 02/01/2023: Patient extubated to nasal cannula. Tolerating well. Will consult SLP for swallowing eval. EEG in place, read pending.  02/02/2023: NAEON. Precedex stopped, seroquel ordered. Haldol ordered prn. [facility] Soft diet started. Possible SD to stroke team today.  2/3/2023: KRYSTIAN, stepdown to stroke          Review of Systems  Constitutional: Denies fevers, weight loss, chills, or weakness.  Eyes: Denies changes in vision.  ENT: Denies dysphagia, nasal discharge, ear pain or discharge.  Cardiovascular: Denies chest pain, palpitations, orthopnea, or claudication.  Respiratory: Denies shortness of breath, cough, hemoptysis, or wheezing.  GI: Denies nausea/vomitting, hematochezia, melena, abd pain, or changes in appetite.  : Denies dysuria, incontinence, or hematuria.  Musculoskeletal: Denies joint pain or myalgias.  Skin/breast: Denies rashes, lumps, lesions, or  discharge.  Neurologic: Denies headache, dizziness, vertigo, or paresthesias.  Psychiatric: Denies changes in mood or hallucinations.  Endocrine: Denies polyuria, polydipsia, heat/cold intolerance.  Hematologic/Lymph: Denies lymphadenopathy, easy bruising or easy bleeding.  Allergic/Immunologic: Denies rash, rhinitis.    Objective:     Vitals:  Temp: 97.7 °F (36.5 °C)  Pulse: 97  Rhythm: normal sinus rhythm  BP: (!) 145/83  MAP (mmHg): 106  Resp: (!) 38  SpO2: 97 %    Temp  Min: 97.7 °F (36.5 °C)  Max: 98.3 °F (36.8 °C)  Pulse  Min: 63  Max: 124  BP  Min: 134/64  Max: 205/93  MAP (mmHg)  Min: 92  Max: 157  Resp  Min: 16  Max: 39  SpO2  Min: 91 %  Max: 100 %    02/02 0701 - 02/03 0700  In: 1074 [P.O.:270; I.V.:804]  Out: 600 [Urine:600]   Unmeasured Output  Urine Occurrence: 1  Stool Occurrence: 0  Pad Count: 1       Physical Exam  GA: comfortable, no acute distress.   HEENT: No scleral icterus or JVD.   Pulmonary: Clear to auscultation A/L.   Cardiac: RRR S1 & S2 w/o rubs/murmurs/gallops.   Abdominal: Bowel sounds present x 4. No appreciable hepatosplenomegaly.  Skin: No jaundice, rashes, or visible lesions.  Neuro:  --GCS: E4 V5 M6  --Mental Status:  awake, oriented X4, follows commands  --CN II-XII grossly intact.   --Pupils 2mm, PERRL.   --Corneal reflex, gag, cough intact.  --QUINN spont    Medications:  Continuoussodium chloride 0.9%, Last Rate: Stopped (02/02/23 2220)  sodium chloride 0.9%    Scheduledalbuterol-ipratropium, 3 mL, Q6H  aspirin, 81 mg, Daily  atorvastatin, 40 mg, Daily  heparin (porcine), 5,000 Units, Q8H  levetiracetam IV, 500 mg, Q12H  mupirocin, , BID  nicotine, 1 patch, Daily  QUEtiapine, 25 mg, BID  senna-docusate 8.6-50 mg, 1 tablet, BID  [START ON 2/4/2023] silodosin, 8 mg, Daily    PRNacetaminophen, 500 mg, Q6H PRN  haloperidol lactate, 5 mg, Q6H PRN  ondansetron, 4 mg, Q8H PRN  sodium chloride 0.9%, 500 mL, Continuous PRN  sodium chloride 0.9%, 10 mL, PRN  sodium chloride 0.9%, 10 mL,  PRN      Today I personally reviewed pertinent medications, lines/drains/airways, imaging, cardiology results, laboratory results, microbiology results,     Diet  Diet Dysphagia Mechanical Soft (IDDSI Level 5)        Assessment/Plan:     Neuro  * Acute right MCA stroke  - VN following   - No TNK and not a candidate for intervention per VN  - MRI brain Extensive foci of diffusion restriction in the right MCA distribution as above, consistent acute right MCA territory infarction.  No evidence of hemorrhagic conversion.  - CTA: Acute right MCA distribution infarct.  Concern for small thrombus with stenosis and or diminished flow involving the inferior branch of the M2 segment of the right MCA. Remote left caudate head lacunar infarct.  - SBP Goal < 220  - Q 1 vitals   - Q 1 neuro checks   - TSH, A1c, lipid, echo and EKG  - Atorvastatin Daily   - Aspirin, SQ heparin   - PT/OT/SLP eval and treat  - [employer] Diet started per SLP  - Possible SD to stroke team  2/3/2023: stepdown to stroke team     Seizure  - Possible TC seizure at OSH, loaded with Keppra 3gms  - EEG read negative for seizure  - Cont Keppra 500mg BID    Encephalopathy    Seroquel 25mg BID  Haldol prn    Cytotoxic brain edema  - see stroke    Pulmonary  Asthma  - duo nebs Q 6     Cardiac/Vascular  HTN (hypertension)  - Permissive HTN in setting of acute stroke  - SBP <220      - Echo 1/31: There is no evidence of intracardiac shunting.   The left ventricle is normal in size with concentric remodeling and normal systolic function.   The estimated ejection fraction is 65%.   Normal left ventricular diastolic function.   Normal right ventricular size with normal right ventricular systolic function.   Normal central venous pressure (3 mmHg).   The estimated PA systolic pressure is 7 mmHg.   There is no pulmonary hypertension.      Other  Impaired activities of daily living  - PT/OT eval and treat when appropriate     Tobacco abuse  - nicotine patch  PRN          The patient is being Prophylaxed for:  Venous Thromboembolism with: Chemical  Stress Ulcer with: None  Ventilator Pneumonia with: not applicable    Activity Orders          Diet Dysphagia Mechanical Soft (IDDSI Level 5): Dysphagia 2 (Mechanical Soft Ground) starting at 02/02 1001    Turn patient starting at 02/01 0000    Elevate HOB starting at 01/31 2245    Progressive Mobility Protocol (mobilize patient to their highest level of functioning at least twice daily) starting at 01/31 1800    Elevate HOB Elevate (30-45 degrees) starting at 01/31 1323    Straight Cath starting at 01/31 1322        Full Code    Temi Landin NP  Neurocritical Care  [facility] - Neuro Critical Care

## 2023-02-03 NOTE — NURSING
POCT glucose 50. Pt asymptomatic andf able to swallow. 270 cc orange juice given PO. Will recheck glucose.

## 2023-02-03 NOTE — PLAN OF CARE
[facility] Care Plan    POC reviewed with Gabriel Springer and family at 1400. Pt verbalized understanding. Questions and concerns addressed. No acute events today. Pt progressing toward goals. Will continue to monitor. See below and flowsheets for full assessment and VS info.    -pt transferred to floor             Is this a stroke patient? yes- Stroke booklet reviewed with patient and family, risk factors identified for patient and stroke booklet remains at bedside for ongoing education.     Neuro:  Daniel Coma Scale  Best Eye Response: 4-->(E4) spontaneous  Best Motor Response: 6-->(M6) obeys commands  Best Verbal Response: 5-->(V5) oriented  Daniel Coma Scale Score: 15  Assessment Qualifiers: patient not sedated/intubated, no eye obstruction present  Pupil PERRLA: yes     24 hr Temp:  [97.7 °F (36.5 °C)-98.3 °F (36.8 °C)]     CV:   Rhythm: normal sinus rhythm  BP goals:   SBP < 220  MAP > 65    Resp:      Vent Mode: Spont  Set Rate: 20 BPM  Oxygen Concentration (%): 40  Vt Set: 470 mL  PEEP/CPAP: 5 cmH20  Pressure Support: 5 cmH20    Plan: N/A    GI/:     Diet/Nutrition Received: mechanical/dental soft  Last Bowel Movement: 02/02/23  Voiding Characteristics: other (see comments) (intermittent cath)    Intake/Output Summary (Last 24 hours) at 2/3/2023 1415  Last data filed at 2/3/2023 1201  Gross per 24 hour   Intake 1022.46 ml   Output 1200 ml   Net -177.54 ml     Unmeasured Output  Urine Occurrence: 1  Stool Occurrence: 0  Pad Count: 1    Labs/Accuchecks:  Recent Labs   Lab 02/03/23  0506   WBC 7.26   RBC 5.95   HGB 18.3*   HCT 54.3*         Recent Labs   Lab 02/03/23  0506      K 4.3   CO2 20*      BUN 14   CREATININE 1.0   ALKPHOS 114   ALT 18   AST 42*   BILITOT 0.8    No results for input(s): PROTIME, INR, APTT, HEPANTIXA in the last 168 hours. No results for input(s): CPK, CPKMB, TROPONINI, MB in the last 168 hours.    Electrolytes: N/A - electrolytes WDL  Accuchecks: Q6H    Gtts:   sodium  chloride 0.9% Stopped (02/02/23 2220)    sodium chloride 0.9%         LDA/Wounds:  Lines/Drains/Airways       Peripheral Intravenous Line  Duration                  Peripheral IV - Single Lumen 02/03/23 0700 20 G Posterior;Right Forearm <1 day                  Wounds: No  Wound care consulted: No

## 2023-02-04 LAB
BACTERIA #/AREA URNS AUTO: ABNORMAL /HPF
BILIRUB UR QL STRIP: NEGATIVE
CLARITY UR REFRACT.AUTO: ABNORMAL
COLOR UR AUTO: ABNORMAL
GLUCOSE UR QL STRIP: NEGATIVE
HGB UR QL STRIP: ABNORMAL
HYALINE CASTS UR QL AUTO: 0 /LPF
KETONES UR QL STRIP: ABNORMAL
LEUKOCYTE ESTERASE UR QL STRIP: NEGATIVE
MICROSCOPIC COMMENT: ABNORMAL
NITRITE UR QL STRIP: NEGATIVE
PH UR STRIP: 5 [PH] (ref 5–8)
POCT GLUCOSE: 64 MG/DL (ref 70–110)
POCT GLUCOSE: 77 MG/DL (ref 70–110)
POCT GLUCOSE: 79 MG/DL (ref 70–110)
POCT GLUCOSE: 83 MG/DL (ref 70–110)
PROT UR QL STRIP: ABNORMAL
RBC #/AREA URNS AUTO: >100 /HPF (ref 0–4)
SP GR UR STRIP: 1.02 (ref 1–1.03)
URN SPEC COLLECT METH UR: ABNORMAL
WBC #/AREA URNS AUTO: >100 /HPF (ref 0–5)

## 2023-02-04 PROCEDURE — 25000003 PHARM REV CODE 250: Performed by: PSYCHIATRY & NEUROLOGY

## 2023-02-04 PROCEDURE — 25000003 PHARM REV CODE 250: Performed by: PHYSICIAN ASSISTANT

## 2023-02-04 PROCEDURE — 87086 URINE CULTURE/COLONY COUNT: CPT | Performed by: STUDENT IN AN ORGANIZED HEALTH CARE EDUCATION/TRAINING PROGRAM

## 2023-02-04 PROCEDURE — 99233 PR SUBSEQUENT HOSPITAL CARE,LEVL III: ICD-10-PCS | Mod: GC,,, | Performed by: PSYCHIATRY & NEUROLOGY

## 2023-02-04 PROCEDURE — 99233 SBSQ HOSP IP/OBS HIGH 50: CPT | Mod: GC,,, | Performed by: PSYCHIATRY & NEUROLOGY

## 2023-02-04 PROCEDURE — 20600001 HC STEP DOWN PRIVATE ROOM

## 2023-02-04 PROCEDURE — 94640 AIRWAY INHALATION TREATMENT: CPT

## 2023-02-04 PROCEDURE — 11000001 HC ACUTE MED/SURG PRIVATE ROOM

## 2023-02-04 PROCEDURE — 25000003 PHARM REV CODE 250: Performed by: NURSE PRACTITIONER

## 2023-02-04 PROCEDURE — S4991 NICOTINE PATCH NONLEGEND: HCPCS | Performed by: NURSE PRACTITIONER

## 2023-02-04 PROCEDURE — 25000242 PHARM REV CODE 250 ALT 637 W/ HCPCS: Performed by: NURSE PRACTITIONER

## 2023-02-04 PROCEDURE — 51798 US URINE CAPACITY MEASURE: CPT

## 2023-02-04 PROCEDURE — 51701 INSERT BLADDER CATHETER: CPT

## 2023-02-04 PROCEDURE — 63600175 PHARM REV CODE 636 W HCPCS: Performed by: NURSE PRACTITIONER

## 2023-02-04 PROCEDURE — 99900035 HC TECH TIME PER 15 MIN (STAT)

## 2023-02-04 PROCEDURE — 94761 N-INVAS EAR/PLS OXIMETRY MLT: CPT

## 2023-02-04 PROCEDURE — 99900031 HC PATIENT EDUCATION (STAT)

## 2023-02-04 PROCEDURE — 63600175 PHARM REV CODE 636 W HCPCS: Performed by: STUDENT IN AN ORGANIZED HEALTH CARE EDUCATION/TRAINING PROGRAM

## 2023-02-04 PROCEDURE — 87088 URINE BACTERIA CULTURE: CPT | Performed by: STUDENT IN AN ORGANIZED HEALTH CARE EDUCATION/TRAINING PROGRAM

## 2023-02-04 PROCEDURE — 97116 GAIT TRAINING THERAPY: CPT | Mod: CQ

## 2023-02-04 PROCEDURE — 97530 THERAPEUTIC ACTIVITIES: CPT | Mod: CQ

## 2023-02-04 PROCEDURE — 25000003 PHARM REV CODE 250: Performed by: STUDENT IN AN ORGANIZED HEALTH CARE EDUCATION/TRAINING PROGRAM

## 2023-02-04 PROCEDURE — 81001 URINALYSIS AUTO W/SCOPE: CPT | Performed by: STUDENT IN AN ORGANIZED HEALTH CARE EDUCATION/TRAINING PROGRAM

## 2023-02-04 RX ORDER — SODIUM CHLORIDE, SODIUM LACTATE, POTASSIUM CHLORIDE, CALCIUM CHLORIDE 600; 310; 30; 20 MG/100ML; MG/100ML; MG/100ML; MG/100ML
INJECTION, SOLUTION INTRAVENOUS CONTINUOUS
Status: ACTIVE | OUTPATIENT
Start: 2023-02-04 | End: 2023-02-04

## 2023-02-04 RX ORDER — AMLODIPINE BESYLATE 5 MG/1
5 TABLET ORAL DAILY
Status: DISCONTINUED | OUTPATIENT
Start: 2023-02-04 | End: 2023-02-05

## 2023-02-04 RX ADMIN — SENNOSIDES AND DOCUSATE SODIUM 1 TABLET: 50; 8.6 TABLET ORAL at 08:02

## 2023-02-04 RX ADMIN — LEVETIRACETAM 500 MG: 500 TABLET, FILM COATED ORAL at 08:02

## 2023-02-04 RX ADMIN — IPRATROPIUM BROMIDE AND ALBUTEROL SULFATE 3 ML: .5; 3 SOLUTION RESPIRATORY (INHALATION) at 07:02

## 2023-02-04 RX ADMIN — ACETAMINOPHEN 500 MG: 500 TABLET ORAL at 08:02

## 2023-02-04 RX ADMIN — MUPIROCIN: 20 OINTMENT TOPICAL at 08:02

## 2023-02-04 RX ADMIN — ATORVASTATIN CALCIUM 40 MG: 40 TABLET, FILM COATED ORAL at 08:02

## 2023-02-04 RX ADMIN — HEPARIN SODIUM 5000 UNITS: 5000 INJECTION INTRAVENOUS; SUBCUTANEOUS at 01:02

## 2023-02-04 RX ADMIN — AMLODIPINE BESYLATE 5 MG: 5 TABLET ORAL at 01:02

## 2023-02-04 RX ADMIN — ASPIRIN 81 MG: 81 TABLET, CHEWABLE ORAL at 08:02

## 2023-02-04 RX ADMIN — IPRATROPIUM BROMIDE AND ALBUTEROL SULFATE 3 ML: .5; 3 SOLUTION RESPIRATORY (INHALATION) at 08:02

## 2023-02-04 RX ADMIN — SILODOSIN 8 MG: 4 CAPSULE ORAL at 08:02

## 2023-02-04 RX ADMIN — HEPARIN SODIUM 5000 UNITS: 5000 INJECTION INTRAVENOUS; SUBCUTANEOUS at 10:02

## 2023-02-04 RX ADMIN — MUPIROCIN: 20 OINTMENT TOPICAL at 09:02

## 2023-02-04 RX ADMIN — SODIUM CHLORIDE, SODIUM LACTATE, POTASSIUM CHLORIDE, AND CALCIUM CHLORIDE: .6; .31; .03; .02 INJECTION, SOLUTION INTRAVENOUS at 12:02

## 2023-02-04 RX ADMIN — NICOTINE 1 PATCH: 14 PATCH, EXTENDED RELEASE TRANSDERMAL at 08:02

## 2023-02-04 RX ADMIN — QUETIAPINE FUMARATE 25 MG: 25 TABLET ORAL at 08:02

## 2023-02-04 RX ADMIN — CLOPIDOGREL BISULFATE 75 MG: 75 TABLET ORAL at 08:02

## 2023-02-04 RX ADMIN — IPRATROPIUM BROMIDE AND ALBUTEROL SULFATE 3 ML: .5; 3 SOLUTION RESPIRATORY (INHALATION) at 12:02

## 2023-02-04 RX ADMIN — HEPARIN SODIUM 5000 UNITS: 5000 INJECTION INTRAVENOUS; SUBCUTANEOUS at 06:02

## 2023-02-04 NOTE — SUBJECTIVE & OBJECTIVE
Neurologic Chief Complaint: LUE weakness and numbness    Subjective:     Interval History: Patient is seen for follow-up neurological assessment and treatment recommendations: KRYSTIAN, neuro stable, patient agitated with nursing staff and doesn't like to be bothered. Continued on DAPT, BP controlled not on antihypertensives at this time. Tolerating PO.  cc in last 24 hrs. Will need placement for IPR.      HPI, Past Medical, Family, and Social History remains the same as documented in the initial encounter.     Review of Systems   Constitutional:  Negative for fever.   HENT:  Negative for drooling and trouble swallowing.    Respiratory:  Negative for cough.    Gastrointestinal:  Negative for vomiting.   Genitourinary:  Positive for difficulty urinating.   Neurological:  Negative for weakness.   Psychiatric/Behavioral:  Positive for agitation.    Scheduled Meds:   albuterol-ipratropium  3 mL Nebulization Q6H    aspirin  81 mg Oral Daily    atorvastatin  40 mg Oral Daily    clopidogreL  75 mg Oral Daily    heparin (porcine)  5,000 Units Subcutaneous Q8H    levETIRAcetam  500 mg Oral BID    mupirocin   Nasal BID    nicotine  1 patch Transdermal Daily    QUEtiapine  25 mg Oral BID    senna-docusate 8.6-50 mg  1 tablet Oral BID    silodosin  8 mg Oral Daily     Continuous Infusions:   sodium chloride 0.9% Stopped (02/02/23 2220)    sodium chloride 0.9%       PRN Meds:acetaminophen, haloperidol lactate, ondansetron, sodium chloride 0.9%, sodium chloride 0.9%, sodium chloride 0.9%    Objective:     Vital Signs (Most Recent):  Temp: 97.4 °F (36.3 °C) (02/04/23 0850)  Pulse: 90 (02/04/23 0850)  Resp: 14 (02/04/23 0850)  BP: 139/78 (02/04/23 0850)  SpO2: 95 % (02/04/23 0850)  BP Location: Left arm    Vital Signs Range (Last 24H):  Temp:  [97.4 °F (36.3 °C)-98.9 °F (37.2 °C)]   Pulse:  [66-97]   Resp:  [14-52]   BP: (134-184)/(64-99)   SpO2:  [94 %-99 %]   BP Location: Left arm    Physical Exam  Vitals and nursing note  reviewed.   HENT:      Head: Normocephalic and atraumatic.      Nose: No rhinorrhea.   Eyes:      General: No scleral icterus.  Cardiovascular:      Rate and Rhythm: Normal rate.   Pulmonary:      Effort: Pulmonary effort is normal. No respiratory distress.   Musculoskeletal:         General: No tenderness.   Skin:     General: Skin is warm and dry.   Neurological:      Mental Status: He is alert.      Motor: Weakness present.      Comments: Moves all extremities spontaneously   Follows commands   Alert and oriented   Poor participation effort 2/2 agitation        Neurological Exam:   LOC: alert  Attention Span: poor  Language: No aphasia  Articulation: Dysarthria  Orientation: Not oriented to time  EOM (CN III, IV, VI): Full/intact  Facial Movement (CN VII): Symmetric facial expression    Motor: Moves all extremities spontaneously and antigravity; strength testing limited 2/2 poor participation and agitation   Sensation: Intact to light touch     Laboratory:  CMP:   No results for input(s): GLUCOSE, CALCIUM, ALBUMIN, PROT, NA, K, CO2, CL, BUN, CREATININE, ALKPHOS, ALT, AST, BILITOT in the last 24 hours.    BMP:   Recent Labs   Lab 02/03/23  0506      K 4.3      CO2 20*   BUN 14   CREATININE 1.0   CALCIUM 9.4       CBC:   Recent Labs   Lab 02/03/23  0506   WBC 7.26   RBC 5.95   HGB 18.3*   HCT 54.3*      MCV 91   MCH 30.8   MCHC 33.7       Lipid Panel:   Recent Labs   Lab 01/31/23  0936   CHOL 176   LDLCALC 108.2   HDL 55   TRIG 64       Coagulation: No results for input(s): PT, INR, APTT in the last 168 hours.  Platelet Aggregation Study: No results for input(s): PLTAGG, PLTAGINTERP, PLTAGREGLACO, ADPPLTAGGREG in the last 168 hours.  Hgb A1C:   Recent Labs   Lab 01/31/23  0845   HGBA1C 4.8       TSH:   Recent Labs   Lab 01/31/23  0845   TSH 2.132         Diagnostic Results     Brain Imaging   MRI Brain WO Contrast 1/31/23     Extensive foci of diffusion restriction in the right MCA distribution  as above, consistent acute right MCA territory infarction.  No evidence of hemorrhagic conversion. Changes of chronic small vessel ischemic disease and cerebral volume loss.    CTH 1/31/23   1. Ill-defined areas of parenchymal hypoattenuation within the right temporoparietal region most concerning for age-indeterminate infarcts.   2. Suspected remote infarcts of the posterior right occipital lobe and bilateral basal ganglia.  3. Chronic microvascular ischemic change.    Vessel Imaging   CTA H/N 1/31/23   Acute right MCA distribution infarct.  Concern for small thrombus with stenosis and or diminished flow involving the inferior branch of the M2 segment of the right MCA. Remote left caudate head lacunar infarct. Bullous emphysematous changes.    US Carotid BL 1/31/23   Abnormal examination with elevated peak systolic velocity within the right internal carotid artery.  There is also a high resistance waveform within the right common and internal carotid arteries.  Note that the patient was moving during the examination.     Cardiac Imaging   TTE 2/1/23   The left ventricle is normal in size with concentric remodeling and normal systolic function.  The estimated ejection fraction is 68%.  Normal left ventricular diastolic function.  Normal right ventricular size with normal right ventricular systolic function.  Mechanically ventilated; cannot use inferior caval vein diameter to estimate central venous pressure.    TTE 1/31/23   There is no evidence of intracardiac shunting.  The left ventricle is normal in size with concentric remodeling and normal systolic function.  The estimated ejection fraction is 65%.  Normal left ventricular diastolic function.  Normal right ventricular size with normal right ventricular systolic function.  Normal central venous pressure (3 mmHg).  The estimated PA systolic pressure is 7 mmHg.  There is no pulmonary hypertension.

## 2023-02-04 NOTE — PROGRESS NOTES
Devin Coleman - Neurosurgery ([facility])  Vascular Neurology  Comprehensive Stroke Center  Progress Note    Assessment/Plan:     * Embolic stroke involving right middle cerebral artery  Patient is a 76yo M with PMH of smoking, asthma, and BPH. He presented as a transfer from the [facility] for R MCA infarct and encephalopathy. Patient went to ED at  for LUE weakness and numbness for the past 2 weeks. He reported having difficulty walking at home. Patient required intubation due to hypoxia at OSH prior to arrival. On arrival to [facility] NIHSS 26. CTA with small thrombus with stenosis/diminshed flow through R M2. No intervention. Patient admitted to [facility]. MRI with R temporal, parietal, posterior frontal, and subinsular cortex infarcts. TTE unremarkable. He was admitted to [facility] and later extubated.     Etiology at this time ESUS. Will need 30d cardiac event monitor at discharge. Therapy recommending IPR and minced & moist diet with thin liquids. Patient pending step down to NPU. Okay with stepping down to NPU based off of patient's current clinical picture. Should any deterioration of patient's status occur, please notify the stroke team prior to transferring patient to floor. Recommend starting DAPT.     Antithrombotics for secondary stroke prevention: Antiplatelets: Aspirin: 81 mg daily and Clopidogrel: 75 mg daily     Statins for secondary stroke prevention and hyperlipidemia, if present:   Statins: Atorvastatin- 40 mg daily    Aggressive risk factor modification: HTN, HLD     Rehab efforts: IPR; Minced & Moist with thin liquids     Diagnostics ordered/pending: None     VTE prophylaxis: Heparin 5000 units SQ every 8 hours    BP parameters: Infarct: No intervention, SBP <220        Urinary retention  Silodosin started   If persists consider ramirez placement and urology consult   Hx of BPH   - Appears dry on exam  -  cc in last 24 hrs.    - Supportive care with IVFs   - Continue silodosin.     Urinary tract  infection with hematuria  -UA with UTI on 2/1. Urine culture negative/no growth, asymptomatic      Seizure  On Keppra 500mg BID   EEG negative   Will continue to monitor     Encephalopathy  Has been getting seroquel 25mg BID and haldol PRN while in [facility]      HTN (hypertension)  Stroke risk factor  Patient hypertensive as high as 245/121 on admission.  Was on cardene, stopped on 2/1 at 0843.  He is not noted to be on any BP meds at home.  SBP goal at this time < 220    - Start Norvasc 5 mg daily    Cytotoxic brain edema  Area of cytotoxic cerebral edema identified when reviewing brain imaging. We will continue to monitor the patients clinical exam for any worsening of symptoms which may indicate expansion of the stroke or the area of the edema resulting in the clinical change. The pattern is suggestive of embolic etiology. Continue to check neuro exam q1h while in NCC and q4h when on NPU. Repeat CTH PRN for acute neuro exam changes.     Stable at this time but will continue to monitor       Tobacco abuse  Stroke risk factor  - on smoking cessation once appropriate         2/2/2023 Extubated yesterday (2/1).  Tolerating well.  Off Precedex since 0900 today, redirectable.  Therapy recommending IPR.  Awaiting stepdown to NPU.  02/03/2023 Patient with urinary retention overnight, silodosin initiated. Patient required Haldol injections overnight. Seroquel scheduled. Patient is only on ASA at this time. Recommend DAPT. Etiology ESUS at this time. Will need cardiac event monitor at discharge. Therapy with recs for IPR and minced & moist diet with thin liquids.   2/4/2023: KRYSTIAN, neuro stable, patient agitated with nursing staff and doesn't like to be bothered, continued on DAPT, BP fluctuating, will start Norvasc 5 mg. Tolerating PO. Will need placement for IPR.        STROKE DOCUMENTATION   Acute Stroke Times   Stroke Team Called Date: 01/31/23  Stroke Team Called Time: 2230  Stroke Team Arrival Date:  01/31/23  Stroke Team Arrival Time: 2235  CT Interpretation Time: 2225 (done at OSH)  Thrombolytic Therapy Recommended: No  CTA Interpretation Time: 2225 (done at OSH)  Thrombectomy Recommended: No  MRI Acute Stroke Protocol Interpretation Time: 2225 (done at OSH)    NIH Scale:  1a. Level of Consciousness: 1-->Not alert, but arousable by minor stimulation to obey, answer, or respond  1b. LOC Questions: 0-->Answers both questions correctly  1c. LOC Commands: 0-->Performs both tasks correctly  2. Best Gaze: 1-->Partial gaze palsy, gaze is abnormal in one or both eyes, but forced deviation or total gaze paresis is not present  3. Visual: 1-->Partial hemianopia  4. Facial Palsy: 0-->Normal symmetrical movements  5a. Motor Arm, Left: 2-->Some effort against gravity, limb cannot get to or maintain (if cued) 90 (or 45) degrees, drifts down to bed, but has some effort against gravity  5b. Motor Arm, Right: 1-->Drift, limb holds 90 (or 45) degrees, but drifts down before full 10 secs, does not hit bed or other support  6a. Motor Leg, Left: 1-->Drift, leg falls by the end of the 5-sec period but does not hit bed  6b. Motor Leg, Right: 1-->Drift, leg falls by the end of the 5-sec period but does not hit bed  7. Limb Ataxia: 0-->Absent  8. Sensory: 0-->Normal, no sensory loss  9. Best Language: 0-->No aphasia, normal  10. Dysarthria: 1-->Mild-to-moderate dysarthria, patient slurs at least some words and, at worst, can be understood with some difficulty  11. Extinction and Inattention (formerly Neglect): 0-->No abnormality  Total (NIH Stroke Scale): 9       Modified Sterling Score: 1  Daniel Coma Scale:    ABCD2 Score:    [id number] Score:   HAS -BLED Score:   ICH Score:   Hunt & Sierra Classification:      Hemorrhagic change of an Ischemic Stroke: Does this patient have an ischemic stroke with hemorrhagic changes? No     Neurologic Chief Complaint: LUE weakness and numbness    Subjective:     Interval History: Patient is seen  for follow-up neurological assessment and treatment recommendations: KRYSTIAN, neuro stable, patient agitated with nursing staff and doesn't like to be bothered. Continued on DAPT, BP controlled not on antihypertensives at this time. Tolerating PO.  cc in last 24 hrs. Will need placement for IPR.      HPI, Past Medical, Family, and Social History remains the same as documented in the initial encounter.     Review of Systems   Constitutional:  Negative for fever.   HENT:  Negative for drooling and trouble swallowing.    Respiratory:  Negative for cough.    Gastrointestinal:  Negative for vomiting.   Genitourinary:  Positive for difficulty urinating.   Neurological:  Negative for weakness.   Psychiatric/Behavioral:  Positive for agitation.    Scheduled Meds:   albuterol-ipratropium  3 mL Nebulization Q6H    aspirin  81 mg Oral Daily    atorvastatin  40 mg Oral Daily    clopidogreL  75 mg Oral Daily    heparin (porcine)  5,000 Units Subcutaneous Q8H    levETIRAcetam  500 mg Oral BID    mupirocin   Nasal BID    nicotine  1 patch Transdermal Daily    QUEtiapine  25 mg Oral BID    senna-docusate 8.6-50 mg  1 tablet Oral BID    silodosin  8 mg Oral Daily     Continuous Infusions:   sodium chloride 0.9% Stopped (02/02/23 2220)    sodium chloride 0.9%       PRN Meds:acetaminophen, haloperidol lactate, ondansetron, sodium chloride 0.9%, sodium chloride 0.9%, sodium chloride 0.9%    Objective:     Vital Signs (Most Recent):  Temp: 97.4 °F (36.3 °C) (02/04/23 0850)  Pulse: 90 (02/04/23 0850)  Resp: 14 (02/04/23 0850)  BP: 139/78 (02/04/23 0850)  SpO2: 95 % (02/04/23 0850)  BP Location: Left arm    Vital Signs Range (Last 24H):  Temp:  [97.4 °F (36.3 °C)-98.9 °F (37.2 °C)]   Pulse:  [66-97]   Resp:  [14-52]   BP: (134-184)/(64-99)   SpO2:  [94 %-99 %]   BP Location: Left arm    Physical Exam  Vitals and nursing note reviewed.   HENT:      Head: Normocephalic and atraumatic.      Nose: No rhinorrhea.   Eyes:      General: No  scleral icterus.  Cardiovascular:      Rate and Rhythm: Normal rate.   Pulmonary:      Effort: Pulmonary effort is normal. No respiratory distress.   Musculoskeletal:         General: No tenderness.   Skin:     General: Skin is warm and dry.   Neurological:      Mental Status: He is alert.      Motor: Weakness present.      Comments: Moves all extremities spontaneously   Follows commands   Alert and oriented   Poor participation effort 2/2 agitation        Neurological Exam:   LOC: alert  Attention Span: poor  Language: No aphasia  Articulation: Dysarthria  Orientation: Not oriented to time  EOM (CN III, IV, VI): Full/intact  Facial Movement (CN VII): Symmetric facial expression    Motor: Moves all extremities spontaneously and antigravity; strength testing limited 2/2 poor participation and agitation   Sensation: Intact to light touch     Laboratory:  CMP:   No results for input(s): GLUCOSE, CALCIUM, ALBUMIN, PROT, NA, K, CO2, CL, BUN, CREATININE, ALKPHOS, ALT, AST, BILITOT in the last 24 hours.    BMP:   Recent Labs   Lab 02/03/23  0506      K 4.3      CO2 20*   BUN 14   CREATININE 1.0   CALCIUM 9.4       CBC:   Recent Labs   Lab 02/03/23  0506   WBC 7.26   RBC 5.95   HGB 18.3*   HCT 54.3*      MCV 91   MCH 30.8   MCHC 33.7       Lipid Panel:   Recent Labs   Lab 01/31/23  0936   CHOL 176   LDLCALC 108.2   HDL 55   TRIG 64       Coagulation: No results for input(s): PT, INR, APTT in the last 168 hours.  Platelet Aggregation Study: No results for input(s): PLTAGG, PLTAGINTERP, PLTAGREGLACO, ADPPLTAGGREG in the last 168 hours.  Hgb A1C:   Recent Labs   Lab 01/31/23  0845   HGBA1C 4.8       TSH:   Recent Labs   Lab 01/31/23  0845   TSH 2.132         Diagnostic Results     Brain Imaging   MRI Brain WO Contrast 1/31/23     Extensive foci of diffusion restriction in the right MCA distribution as above, consistent acute right MCA territory infarction.  No evidence of hemorrhagic conversion. Changes of  chronic small vessel ischemic disease and cerebral volume loss.    CTH 1/31/23   1. Ill-defined areas of parenchymal hypoattenuation within the right temporoparietal region most concerning for age-indeterminate infarcts.   2. Suspected remote infarcts of the posterior right occipital lobe and bilateral basal ganglia.  3. Chronic microvascular ischemic change.    Vessel Imaging   CTA H/N 1/31/23   Acute right MCA distribution infarct.  Concern for small thrombus with stenosis and or diminished flow involving the inferior branch of the M2 segment of the right MCA. Remote left caudate head lacunar infarct. Bullous emphysematous changes.    US Carotid BL 1/31/23   Abnormal examination with elevated peak systolic velocity within the right internal carotid artery.  There is also a high resistance waveform within the right common and internal carotid arteries.  Note that the patient was moving during the examination.     Cardiac Imaging   TTE 2/1/23   The left ventricle is normal in size with concentric remodeling and normal systolic function.  The estimated ejection fraction is 68%.  Normal left ventricular diastolic function.  Normal right ventricular size with normal right ventricular systolic function.  Mechanically ventilated; cannot use inferior caval vein diameter to estimate central venous pressure.    TTE 1/31/23   There is no evidence of intracardiac shunting.  The left ventricle is normal in size with concentric remodeling and normal systolic function.  The estimated ejection fraction is 65%.  Normal left ventricular diastolic function.  Normal right ventricular size with normal right ventricular systolic function.  Normal central venous pressure (3 mmHg).  The estimated PA systolic pressure is 7 mmHg.  There is no pulmonary hypertension.      Lalo Colin MD  Comprehensive Stroke Center  Department of Vascular Neurology   [facility])

## 2023-02-04 NOTE — NURSING
Nurses Note -- 4 Eyes      2/3/2023   6:51 PM      Skin assessed during: Transfer      [] No Pressure Injuries Present    []Prevention Measures Documented      [] Yes- Altered Skin Integrity Present or Discovered   [] LDA Added if Not in Epic (Describe Wound)   [] New Altered Skin Integrity was Present on Admit and Documented in LDA   [] Wound Image Taken    Wound Care Consulted? No    Attending Nurse:  Lois Richard RN     Second RN/Staff Member:   ALEKSANDER Concepicon

## 2023-02-04 NOTE — ASSESSMENT & PLAN NOTE
Patient is a 78yo M with PMH of smoking, asthma, and BPH. [place] as a [place] [facility] [place] MCA infarct and encephalopathy. Patient went to ED at  for LUE weakness and numbness for the past 2 weeks. He reported having difficulty walking at home. Patient required intubation due to hypoxia at OSH prior to arrival. On arrival to [facility] NIHSS 26. CTA with small thrombus with stenosis/diminshed flow through R M2. No intervention. Patient admitted to [facility]. MRI with R temporal, parietal, posterior frontal, and subinsular cortex infarcts. TTE unremarkable. He was admitted to [facility] and later extubated.     Etiology at this time ESUS. Will need 30d cardiac event monitor at discharge. Therapy recommending IPR and minced & moist diet with thin liquids. Patient pending step down to NPU. Okay with stepping down to NPU based off of patient's current clinical picture. Should any deterioration of patient's status occur, please notify the stroke team prior to transferring patient to floor. Recommend starting DAPT.     Antithrombotics for secondary stroke prevention: Antiplatelets: Aspirin: 81 mg daily and Clopidogrel: 75 mg daily     Statins for secondary stroke prevention and hyperlipidemia, if present:   Statins: Atorvastatin- 40 mg daily    Aggressive risk factor modification: HTN, HLD     Rehab efforts: IPR; Minced & Moist with thin liquids     Diagnostics ordered/pending: None     VTE prophylaxis: Heparin 5000 units SQ every 8 hours    BP parameters: Infarct: No intervention, SBP <220

## 2023-02-04 NOTE — PLAN OF CARE
"A&O4, RA, denies pain. Pt declines most assessments overnight, stating "I'm sleeping. Y'all be coming in here in the middle of the night and messing with people when they are trying to sleep. Leave me alone."   When RN asked pt if he has gone to the bathroom overnight, pt states "I think I have" but pt has not been out of bed (bed alarm on and telesitter in room) and bed is dry. Attempted bladder scan at 0000, pt declined. Will attempt bladder scan again at 0600 pending pt's tolerance/acceptance of care.       Problem: Adult Inpatient Plan of Care  Goal: Plan of Care Review  Outcome: Ongoing, Progressing  Goal: Absence of Hospital-Acquired Illness or Injury  Outcome: Ongoing, Progressing     Problem: Adjustment to Illness (Stroke, Ischemic/Transient Ischemic Attack)  Goal: Optimal Coping  Outcome: Ongoing, Progressing     Problem: Cognitive Impairment (Stroke, Ischemic/Transient Ischemic Attack)  Goal: Optimal Cognitive Function  Outcome: Ongoing, Progressing     Problem: Communication Impairment (Stroke, Ischemic/Transient Ischemic Attack)  Goal: Improved Communication Skills  Outcome: Ongoing, Progressing     Problem: Swallowing Impairment (Stroke, Ischemic/Transient Ischemic Attack)  Goal: Optimal Eating and Swallowing without Aspiration  Outcome: Ongoing, Progressing     Problem: Fall Injury Risk  Goal: Absence of Fall and Fall-Related Injury  Outcome: Ongoing, Progressing     Problem: Skin Injury Risk Increased  Goal: Skin Health and Integrity  Outcome: Ongoing, Progressing     "

## 2023-02-04 NOTE — ASSESSMENT & PLAN NOTE
Silodosin started   If persists consider ramirez placement and urology consult   Hx of BPH   - Appears dry on exam  -  cc in last 24 hrs.    - Supportive care with IVFs   - Continue silodosin.

## 2023-02-04 NOTE — PT/OT/SLP PROGRESS
"Physical Therapy Treatment    Patient Name:  Gabriel Springer   MRN:  [id number]    Recommendations:     Discharge Recommendations: rehabilitation facility  Discharge Equipment Recommendations: bath bench  Barriers to discharge: Decreased caregiver support and impaired functional mobility requiring increased assistance    Assessment:     Gabriel Springer is a 77 y.o. male admitted with a medical diagnosis of Embolic stroke involving right middle cerebral artery.  He presents with the following impairments/functional limitations: weakness, impaired endurance, impaired sensation, impaired self care skills, impaired functional mobility, decreased lower extremity function, gait instability, decreased upper extremity function, decreased coordination, impaired balance, decreased safety awareness.    Rehab Prognosis: Good; patient would benefit from acute skilled PT services to address these deficits and reach maximum level of function.    Recent Surgery: * No surgery found *      Plan:     During this hospitalization, patient to be seen 4 x/week to address the identified rehab impairments via gait training, therapeutic activities, therapeutic exercises, neuromuscular re-education and progress toward the following goals:    Plan of Care Expires:  02/24/23    Subjective     Chief Complaint: no c/o  Patient/Family Comments/goals: "I wasn't hungry. I don't like any of that broccoli." (No broccoli on pts tray, encouraged pt to eat from lunch tray at end of session)  Pain/Comfort:  Pain Rating 1: 0/10  Pain Rating Post-Intervention 1: 0/10      Objective:     Communicated with RN prior to session.  Patient found right sidelying, sleeping with bed alarm, telemetry, peripheral IV upon PTA entry to room.     General Precautions: Standard, aspiration, fall  Orthopedic Precautions: N/A  Braces: N/A  Respiratory Status: Room air     Functional Mobility:  Bed Mobility:     Supine to Sit: stand by assistance  Sit to Supine: contact guard " assistance  Transfers:     Sit to Stand:  contact guard assistance with rolling walker  Gait: Pt ambulates ~68 ft with RW and Min A. Single episode of Mod A for LOB laterally to L side. Pt ambulates with slow erika, several brief stops, and poor attention to L side throughout. Therapist assisting with navigating obstacles on L.       AM-PAC 6 CLICK MOBILITY  Turning over in bed (including adjusting bedclothes, sheets and blankets)?: 4  Sitting down on and standing up from a chair with arms (e.g., wheelchair, bedside commode, etc.): 3  Moving from lying on back to sitting on the side of the bed?: 3  Moving to and from a bed to a chair (including a wheelchair)?: 3  Need to walk in hospital room?: 3  Climbing 3-5 steps with a railing?: 3  Basic Mobility Total Score: 19       Treatment & Education:  Assisted pt with doffing/donning gown, as pt had partially removed gown and tangled himself in IV and telemetry. Cues/assist needed to attend to and dress L side.   Pt assisted back to bed at end of session, repositioned for improved upright posture, and provided with food tray. Food tray setup to pts R side d/t observed L neglect. Pt encouraged to eat lunch tray.     Patient left HOB elevated with all lines intact, call button in reach, bed alarm on, and avasys camera present..    GOALS:   Multidisciplinary Problems       Physical Therapy Goals          Problem: Physical Therapy    Goal Priority Disciplines Outcome Goal Variances Interventions   Physical Therapy Goal     PT, PT/OT Ongoing, Progressing     Description: Goals to be met by: 23     Patient will increase functional independence with mobility by performin. Supine to sit with Nemo  2. Sit to stand transfer with Nemo  3. Bed to chair transfer with Nemo using No Assistive Device  4. Gait  x 100 feet with Nemo using No Assistive Device.   5. Lower extremity exercise program x12 reps per handout, with independence                          Time Tracking:     PT Received On: 02/04/23  PT Start Time: 1555     PT Stop Time: 1618  PT Total Time (min): 23 min     Billable Minutes: Gait Training 10 and Therapeutic Activity 13    Treatment Type: Treatment  PT/PTA: PTA     PTA Visit Number: 1     02/04/2023

## 2023-02-04 NOTE — ASSESSMENT & PLAN NOTE
Stroke risk factor  Patient hypertensive as high as 245/121 on admission.  Was on cardene, stopped on 2/1 at 0843.  He is not noted to be on any BP meds at home.  SBP goal at this time < 220    - Start Norvasc 5 mg daily

## 2023-02-04 NOTE — NURSING
"Pt refused midnight glucose check, breathing treatment, and bladder scan. When attempts were made to complete glucose and bladder scan, pt stated "Stop waking me up and just let me sleep. I'm cold, don't touch my blanket."    Pt then pulled away from RN and pulled blanket over his head.   "

## 2023-02-05 LAB
ALBUMIN SERPL BCP-MCNC: 3.2 G/DL (ref 3.5–5.2)
ALP SERPL-CCNC: 87 U/L (ref 55–135)
ALT SERPL W/O P-5'-P-CCNC: 18 U/L (ref 10–44)
ANION GAP SERPL CALC-SCNC: 17 MMOL/L (ref 8–16)
AST SERPL-CCNC: 35 U/L (ref 10–40)
BACTERIA UR CULT: ABNORMAL
BASOPHILS # BLD AUTO: 0.04 K/UL (ref 0–0.2)
BASOPHILS NFR BLD: 0.8 % (ref 0–1.9)
BILIRUB SERPL-MCNC: 0.7 MG/DL (ref 0.1–1)
BUN SERPL-MCNC: 15 MG/DL (ref 8–23)
CALCIUM SERPL-MCNC: 9.5 MG/DL (ref 8.7–10.5)
CHLORIDE SERPL-SCNC: 102 MMOL/L (ref 95–110)
CO2 SERPL-SCNC: 19 MMOL/L (ref 23–29)
CREAT SERPL-MCNC: 0.9 MG/DL (ref 0.5–1.4)
DIFFERENTIAL METHOD: ABNORMAL
EOSINOPHIL # BLD AUTO: 0.2 K/UL (ref 0–0.5)
EOSINOPHIL NFR BLD: 3.7 % (ref 0–8)
ERYTHROCYTE [DISTWIDTH] IN BLOOD BY AUTOMATED COUNT: 14.8 % (ref 11.5–14.5)
EST. GFR  (NO RACE VARIABLE): >60 ML/MIN/1.73 M^2
GLUCOSE SERPL-MCNC: 71 MG/DL (ref 70–110)
HCT VFR BLD AUTO: 52.5 % (ref 40–54)
HGB BLD-MCNC: 17.1 G/DL (ref 14–18)
IMM GRANULOCYTES # BLD AUTO: 0.01 K/UL (ref 0–0.04)
IMM GRANULOCYTES NFR BLD AUTO: 0.2 % (ref 0–0.5)
LYMPHOCYTES # BLD AUTO: 1.3 K/UL (ref 1–4.8)
LYMPHOCYTES NFR BLD: 24.7 % (ref 18–48)
MAGNESIUM SERPL-MCNC: 1.9 MG/DL (ref 1.6–2.6)
MCH RBC QN AUTO: 30.3 PG (ref 27–31)
MCHC RBC AUTO-ENTMCNC: 32.6 G/DL (ref 32–36)
MCV RBC AUTO: 93 FL (ref 82–98)
MONOCYTES # BLD AUTO: 0.6 K/UL (ref 0.3–1)
MONOCYTES NFR BLD: 12.5 % (ref 4–15)
NEUTROPHILS # BLD AUTO: 3 K/UL (ref 1.8–7.7)
NEUTROPHILS NFR BLD: 58.1 % (ref 38–73)
NRBC BLD-RTO: 0 /100 WBC
PHOSPHATE SERPL-MCNC: 3.1 MG/DL (ref 2.7–4.5)
PLATELET # BLD AUTO: 178 K/UL (ref 150–450)
PMV BLD AUTO: 10.6 FL (ref 9.2–12.9)
POCT GLUCOSE: 84 MG/DL (ref 70–110)
POCT GLUCOSE: 99 MG/DL (ref 70–110)
POTASSIUM SERPL-SCNC: 3.7 MMOL/L (ref 3.5–5.1)
PROT SERPL-MCNC: 6.6 G/DL (ref 6–8.4)
RBC # BLD AUTO: 5.65 M/UL (ref 4.6–6.2)
SODIUM SERPL-SCNC: 138 MMOL/L (ref 136–145)
WBC # BLD AUTO: 5.11 K/UL (ref 3.9–12.7)

## 2023-02-05 PROCEDURE — 99900035 HC TECH TIME PER 15 MIN (STAT)

## 2023-02-05 PROCEDURE — 25000003 PHARM REV CODE 250: Performed by: NURSE PRACTITIONER

## 2023-02-05 PROCEDURE — 85025 COMPLETE CBC W/AUTO DIFF WBC: CPT | Performed by: NURSE PRACTITIONER

## 2023-02-05 PROCEDURE — 25000003 PHARM REV CODE 250: Performed by: PHYSICIAN ASSISTANT

## 2023-02-05 PROCEDURE — 25000003 PHARM REV CODE 250: Performed by: PSYCHIATRY & NEUROLOGY

## 2023-02-05 PROCEDURE — 99233 PR SUBSEQUENT HOSPITAL CARE,LEVL III: ICD-10-PCS | Mod: GC,,, | Performed by: PSYCHIATRY & NEUROLOGY

## 2023-02-05 PROCEDURE — 20600001 HC STEP DOWN PRIVATE ROOM

## 2023-02-05 PROCEDURE — 84100 ASSAY OF PHOSPHORUS: CPT | Performed by: NURSE PRACTITIONER

## 2023-02-05 PROCEDURE — S4991 NICOTINE PATCH NONLEGEND: HCPCS | Performed by: NURSE PRACTITIONER

## 2023-02-05 PROCEDURE — 11000001 HC ACUTE MED/SURG PRIVATE ROOM

## 2023-02-05 PROCEDURE — 99233 SBSQ HOSP IP/OBS HIGH 50: CPT | Mod: GC,,, | Performed by: PSYCHIATRY & NEUROLOGY

## 2023-02-05 PROCEDURE — 94761 N-INVAS EAR/PLS OXIMETRY MLT: CPT

## 2023-02-05 PROCEDURE — 80053 COMPREHEN METABOLIC PANEL: CPT | Performed by: NURSE PRACTITIONER

## 2023-02-05 PROCEDURE — 25000242 PHARM REV CODE 250 ALT 637 W/ HCPCS: Performed by: NURSE PRACTITIONER

## 2023-02-05 PROCEDURE — 99900031 HC PATIENT EDUCATION (STAT)

## 2023-02-05 PROCEDURE — 51701 INSERT BLADDER CATHETER: CPT

## 2023-02-05 PROCEDURE — 83735 ASSAY OF MAGNESIUM: CPT | Performed by: NURSE PRACTITIONER

## 2023-02-05 PROCEDURE — 51798 US URINE CAPACITY MEASURE: CPT

## 2023-02-05 PROCEDURE — 36415 COLL VENOUS BLD VENIPUNCTURE: CPT | Performed by: NURSE PRACTITIONER

## 2023-02-05 PROCEDURE — 63600175 PHARM REV CODE 636 W HCPCS: Performed by: NURSE PRACTITIONER

## 2023-02-05 PROCEDURE — 94640 AIRWAY INHALATION TREATMENT: CPT

## 2023-02-05 PROCEDURE — 25000003 PHARM REV CODE 250: Performed by: STUDENT IN AN ORGANIZED HEALTH CARE EDUCATION/TRAINING PROGRAM

## 2023-02-05 RX ORDER — AMLODIPINE BESYLATE 10 MG/1
10 TABLET ORAL DAILY
Status: DISCONTINUED | OUTPATIENT
Start: 2023-02-05 | End: 2023-02-06 | Stop reason: HOSPADM

## 2023-02-05 RX ADMIN — CLOPIDOGREL BISULFATE 75 MG: 75 TABLET ORAL at 09:02

## 2023-02-05 RX ADMIN — SENNOSIDES AND DOCUSATE SODIUM 1 TABLET: 50; 8.6 TABLET ORAL at 09:02

## 2023-02-05 RX ADMIN — HEPARIN SODIUM 5000 UNITS: 5000 INJECTION INTRAVENOUS; SUBCUTANEOUS at 05:02

## 2023-02-05 RX ADMIN — IPRATROPIUM BROMIDE AND ALBUTEROL SULFATE 3 ML: .5; 3 SOLUTION RESPIRATORY (INHALATION) at 08:02

## 2023-02-05 RX ADMIN — HEPARIN SODIUM 5000 UNITS: 5000 INJECTION INTRAVENOUS; SUBCUTANEOUS at 10:02

## 2023-02-05 RX ADMIN — LEVETIRACETAM 500 MG: 500 TABLET, FILM COATED ORAL at 09:02

## 2023-02-05 RX ADMIN — HEPARIN SODIUM 5000 UNITS: 5000 INJECTION INTRAVENOUS; SUBCUTANEOUS at 02:02

## 2023-02-05 RX ADMIN — SILODOSIN 8 MG: 4 CAPSULE ORAL at 09:02

## 2023-02-05 RX ADMIN — ACETAMINOPHEN 500 MG: 500 TABLET ORAL at 09:02

## 2023-02-05 RX ADMIN — MUPIROCIN: 20 OINTMENT TOPICAL at 08:02

## 2023-02-05 RX ADMIN — SENNOSIDES AND DOCUSATE SODIUM 1 TABLET: 50; 8.6 TABLET ORAL at 08:02

## 2023-02-05 RX ADMIN — NICOTINE 1 PATCH: 14 PATCH, EXTENDED RELEASE TRANSDERMAL at 09:02

## 2023-02-05 RX ADMIN — QUETIAPINE FUMARATE 25 MG: 25 TABLET ORAL at 08:02

## 2023-02-05 RX ADMIN — ASPIRIN 81 MG: 81 TABLET, CHEWABLE ORAL at 09:02

## 2023-02-05 RX ADMIN — LEVETIRACETAM 500 MG: 500 TABLET, FILM COATED ORAL at 08:02

## 2023-02-05 RX ADMIN — IPRATROPIUM BROMIDE AND ALBUTEROL SULFATE 3 ML: .5; 3 SOLUTION RESPIRATORY (INHALATION) at 12:02

## 2023-02-05 RX ADMIN — ATORVASTATIN CALCIUM 40 MG: 40 TABLET, FILM COATED ORAL at 09:02

## 2023-02-05 RX ADMIN — MUPIROCIN: 20 OINTMENT TOPICAL at 09:02

## 2023-02-05 RX ADMIN — AMLODIPINE BESYLATE 10 MG: 10 TABLET ORAL at 09:02

## 2023-02-05 RX ADMIN — IPRATROPIUM BROMIDE AND ALBUTEROL SULFATE 3 ML: .5; 3 SOLUTION RESPIRATORY (INHALATION) at 07:02

## 2023-02-05 RX ADMIN — QUETIAPINE FUMARATE 25 MG: 25 TABLET ORAL at 09:02

## 2023-02-05 NOTE — ASSESSMENT & PLAN NOTE
- started on silodosin  - Declining in and out cath with bladder scans showing >400cc  - Attempted to let the patient void on his own, however only voiding about 10-20 cc.   - Place ramirez if patient declines in and out catheter

## 2023-02-05 NOTE — PROGRESS NOTES
Devin Coleman - Neurosurgery ([facility])  Vascular Neurology  Comprehensive Stroke Center  Progress Note    Assessment/Plan:     * Embolic stroke involving right middle cerebral artery  Patient is a 78yo M with PMH of smoking, asthma, and BPH. He presented as a transfer from the [facility] for R MCA infarct and encephalopathy. Patient went to ED at  for LUE weakness and numbness for the past 2 weeks. He reported having difficulty walking at home. Patient required intubation due to hypoxia at OSH prior to arrival. On arrival to [facility] NIHSS 26. CTA with small thrombus with stenosis/diminshed flow through R M2. No intervention. Patient admitted to [facility]. MRI with R temporal, parietal, posterior frontal, and subinsular cortex infarcts. TTE unremarkable. He was admitted to [facility] and later extubated.     Etiology at this time ESUS. Will need 30d cardiac event monitor at discharge. Therapy recommending IPR and minced & moist diet with thin liquids. Patient pending step down to NPU. Okay with stepping down to NPU based off of patient's current clinical picture. Should any deterioration of patient's status occur, please notify the stroke team prior to transferring patient to floor. Recommend starting DAPT.     Antithrombotics for secondary stroke prevention: Antiplatelets: Aspirin: 81 mg daily and Clopidogrel: 75 mg daily     Statins for secondary stroke prevention and hyperlipidemia, if present:   Statins: Atorvastatin- 40 mg daily    Aggressive risk factor modification: HTN, HLD     Rehab efforts: IPR; Minced & Moist with thin liquids     Diagnostics ordered/pending: None     VTE prophylaxis: Heparin 5000 units SQ every 8 hours    BP parameters: Infarct: No intervention, SBP <220    Will need outpatient follow up with bridgette upon discharge to further evaluate options for carotid disease and/ or any specific intervention.     Urinary retention  Silodosin started    Hx of BPH   Declining in and out cath, with bladder  residuals > 400 cc.     - If persists will place ramirez and consult urology   - Supportive care with IVFs   - Continue silodosin.     Urinary tract infection with hematuria  -UA with UTI on 2/1. Urine culture negative/no growth, asymptomatic  - Repeat UA on 2/4/2023 without any bacteria or wbc.       Seizure  On Keppra 500mg BID   EEG negative   Will continue to monitor     Encephalopathy  Has been getting seroquel 25mg BID and haldol PRN while in [facility]      HTN (hypertension)  Stroke risk factor  Patient hypertensive as high as 245/121 on admission.  Was on cardene, stopped on 2/1 at 0843.  He is not noted to be on any BP meds at home.  SBP goal at this time < 220   BP better controlled on Norvasc   - Norvasc 10 mg daily    Cytotoxic brain edema  Area of cytotoxic cerebral edema identified when reviewing brain imaging. We will continue to monitor the patients clinical exam for any worsening of symptoms which may indicate expansion of the stroke or the area of the edema resulting in the clinical change. The pattern is suggestive of embolic etiology. Continue to check neuro exam q1h while in NCC and q4h when on NPU. Repeat CTH PRN for acute neuro exam changes.     Neuro exam stable at this time but will continue to monitor       Prostate atrophy  - started on silodosin  - Declining in and out cath with bladder scans showing >400cc  - Attempted to let the patient void on his own, however only voiding about 10-20 cc.   - Place ramirez if patient declines in and out catheter    Tobacco abuse  Stroke risk factor  - on smoking cessation once appropriate         2/2/2023 Extubated yesterday (2/1).  Tolerating well.  Off Precedex since 0900 today, redirectable.  Therapy recommending IPR.  Awaiting stepdown to NPU.  02/03/2023 Patient with urinary retention overnight, silodosin initiated. Patient required Haldol injections overnight. Seroquel scheduled. Patient is only on ASA at this time. Recommend DAPT. Etiology ESUS at  this time. Will need cardiac event monitor at discharge. Therapy with recs for IPR and minced & moist diet with thin liquids.   2/4/2023: KRYSTIAN, neuro stable, patient agitated with nursing staff and doesn't like to be bothered, continued on DAPT, BP fluctuating, will start Norvasc 5 mg. Tolerating PO. Will need placement for IPR.    2/5/2023: KRYSTIAN, neuro stable- NIH 7, tolerating PO with active bowel movements. Pt has been declining straight cath, will need to place ramirez if he continues. Increasing Norvasc to 10 mg daily. Pending inpatient rehab placement.       STROKE DOCUMENTATION   Acute Stroke Times   Stroke Team Called Date: 01/31/23  Stroke Team Called Time: 2230  Stroke Team Arrival Date: 01/31/23  Stroke Team Arrival Time: 2235  CT Interpretation Time: 2225 (done at OSH)  Thrombolytic Therapy Recommended: No  CTA Interpretation Time: 2225 (done at OSH)  Thrombectomy Recommended: No  MRI Acute Stroke Protocol Interpretation Time: 2225 (done at OSH)    NIH Scale:  1a. Level of Consciousness: 0-->Alert, keenly responsive  1b. LOC Questions: 0-->Answers both questions correctly  1c. LOC Commands: 0-->Performs both tasks correctly  2. Best Gaze: 1-->Partial gaze palsy, gaze is abnormal in one or both eyes, but forced deviation or total gaze paresis is not present  3. Visual: 1-->Partial hemianopia  4. Facial Palsy: 0-->Normal symmetrical movements  5a. Motor Arm, Left: 2-->Some effort against gravity, limb cannot get to or maintain (if cued) 90 (or 45) degrees, drifts down to bed, but has some effort against gravity  5b. Motor Arm, Right: 0-->No drift, limb holds 90 (or 45) degrees for full 10 secs  6a. Motor Leg, Left: 1-->Drift, leg falls by the end of the 5-sec period but does not hit bed  6b. Motor Leg, Right: 1-->Drift, leg falls by the end of the 5-sec period but does not hit bed  7. Limb Ataxia: 0-->Absent  8. Sensory: 0-->Normal, no sensory loss  9. Best Language: 0-->No aphasia, normal  10. Dysarthria:  1-->Mild-to-moderate dysarthria, patient slurs at least some words and, at worst, can be understood with some difficulty  11. Extinction and Inattention (formerly Neglect): 0-->No abnormality  Total (NIH Stroke Scale): 7       Modified [place] Score: 1  [place] Coma Scale:    ABCD2 Score:    [id number] Score:   HAS -BLED Score:   ICH Score:   Hunt & [place] Classification:      Hemorrhagic change of an Ischemic Stroke: Does this patient have an ischemic stroke with hemorrhagic changes? No     Neurologic Chief Complaint: LUE weakness and numbness    Subjective:     Interval History: Patient is seen for follow-up neurological assessment and treatment recommendations: KRYSTIAN neuro stable- NIH 7,  Continued on DAPT, BP with better control on Norvasc, will increase to 10 mg daily. Tolerating PO with active BMs. Pt has been declining straight cath, UA done on 2/4/23 w/o any wbc or bacteruria, will need to place ramirez if he continues to decline in and out. Pending placement for IPR.      HPI, Past Medical, Family, and Social History remains the same as documented in the initial encounter.     Review of Systems   Constitutional:  Negative for fever.   HENT:  Negative for drooling and trouble swallowing.    Respiratory:  Negative for cough.    Gastrointestinal:  Negative for vomiting.   Genitourinary:  Positive for difficulty urinating.   Neurological:  Negative for weakness.   Psychiatric/Behavioral:  Positive for agitation.    Scheduled Meds:   albuterol-ipratropium  3 mL Nebulization Q6H    amLODIPine  10 mg Oral Daily    aspirin  81 mg Oral Daily    atorvastatin  40 mg Oral Daily    clopidogreL  75 mg Oral Daily    heparin (porcine)  5,000 Units Subcutaneous Q8H    levETIRAcetam  500 mg Oral BID    mupirocin   Nasal BID    nicotine  1 patch Transdermal Daily    QUEtiapine  25 mg Oral BID    senna-docusate 8.6-50 mg  1 tablet Oral BID    silodosin  8 mg Oral Daily     Continuous Infusions:   sodium chloride 0.9%       PRN  Meds:acetaminophen, haloperidol lactate, ondansetron, sodium chloride 0.9%, sodium chloride 0.9%, sodium chloride 0.9%    Objective:     Vital Signs (Most Recent):  Temp: 97.7 °F (36.5 °C) (02/05/23 0316)  Pulse: 69 (02/05/23 0719)  Resp: 18 (02/05/23 0708)  BP: (!) 158/70 (02/05/23 0316)  SpO2: 96 % (02/05/23 0708)  BP Location: Left arm    Vital Signs Range (Last 24H):  Temp:  [97.4 °F (36.3 °C)-98.6 °F (37 °C)]   Pulse:  [64-91]   Resp:  [14-22]   BP: (139-166)/(70-86)   SpO2:  [91 %-97 %]   BP Location: Left arm    Physical Exam  Vitals and nursing note reviewed.   HENT:      Head: Normocephalic and atraumatic.      Nose: No rhinorrhea.   Eyes:      General: No scleral icterus.  Cardiovascular:      Rate and Rhythm: Normal rate.   Pulmonary:      Effort: Pulmonary effort is normal. No respiratory distress.   Musculoskeletal:         General: No tenderness.   Skin:     General: Skin is warm and dry.   Neurological:      Mental Status: He is alert.      Motor: Weakness present.      Comments: Moves all extremities spontaneously   Follows commands   Alert and oriented   Poor participation effort 2/2 agitation        Neurological Exam:   LOC: alert  Attention Span: poor  Language: No aphasia  Articulation: Dysarthria  Orientation: Not oriented to time  EOM (CN III, IV, VI): Full/intact  Facial Movement (CN VII): Symmetric facial expression    Motor: Moves all extremities spontaneously and antigravity; strength testing limited 2/2 poor participation and agitation   Sensation: Intact to light touch     Laboratory:  CMP:   Recent Labs   Lab 02/05/23 0351   CALCIUM 9.5   ALBUMIN 3.2*   PROT 6.6      K 3.7   CO2 19*      BUN 15   CREATININE 0.9   ALKPHOS 87   ALT 18   AST 35   BILITOT 0.7       BMP:   Recent Labs   Lab 02/05/23 0351      K 3.7      CO2 19*   BUN 15   CREATININE 0.9   CALCIUM 9.5       CBC:   Recent Labs   Lab 02/05/23 0351   WBC 5.11   RBC 5.65   HGB 17.1   HCT 52.5       MCV 93   MCH 30.3   MCHC 32.6       Lipid Panel:   Recent Labs   Lab 01/31/23  0936   CHOL 176   LDLCALC 108.2   HDL 55   TRIG 64       Coagulation: No results for input(s): PT, INR, APTT in the last 168 hours.  Platelet Aggregation Study: No results for input(s): PLTAGG, PLTAGINTERP, PLTAGREGLACO, ADPPLTAGGREG in the last 168 hours.  Hgb A1C:   Recent Labs   Lab 01/31/23  0845   HGBA1C 4.8       TSH:   Recent Labs   Lab 01/31/23  0845   TSH 2.132         Diagnostic Results     Brain Imaging   MRI Brain WO Contrast 1/31/23     Extensive foci of diffusion restriction in the right MCA distribution as above, consistent acute right MCA territory infarction.  No evidence of hemorrhagic conversion. Changes of chronic small vessel ischemic disease and cerebral volume loss.    CTH 1/31/23   1. Ill-defined areas of parenchymal hypoattenuation within the right temporoparietal region most concerning for age-indeterminate infarcts.   2. Suspected remote infarcts of the posterior right occipital lobe and bilateral basal ganglia.  3. Chronic microvascular ischemic change.    Vessel Imaging   CTA H/N 1/31/23   Acute right MCA distribution infarct.  Concern for small thrombus with stenosis and or diminished flow involving the inferior branch of the M2 segment of the right MCA. Remote left caudate head lacunar infarct. Bullous emphysematous changes.    US Carotid BL 1/31/23   Abnormal examination with elevated peak systolic velocity within the right internal carotid artery.  There is also a high resistance waveform within the right common and internal carotid arteries.  Note that the patient was moving during the examination.     Cardiac Imaging   TTE 2/1/23   The left ventricle is normal in size with concentric remodeling and normal systolic function.  The estimated ejection fraction is 68%.  Normal left ventricular diastolic function.  Normal right ventricular size with normal right ventricular systolic function.  Mechanically  ventilated; cannot use inferior caval vein diameter to estimate central venous pressure.    TTE 1/31/23   There is no evidence of intracardiac shunting.  The left ventricle is normal in size with concentric remodeling and normal systolic function.  The estimated ejection fraction is 65%.  Normal left ventricular diastolic function.  Normal right ventricular size with normal right ventricular systolic function.  Normal central venous pressure (3 mmHg).  The estimated PA systolic pressure is 7 mmHg.  There is no pulmonary hypertension.      Lalo Colin MD  [facility]  Department of Vascular Neurology   [facility])

## 2023-02-05 NOTE — ASSESSMENT & PLAN NOTE
Stroke risk factor  Patient hypertensive as high as 245/121 on admission.  Was on cardene, stopped on 2/1 at 0843.  He is not noted to be on any BP meds at home.  SBP goal at this time < 220   BP better controlled on Norvasc   - Norvasc 10 mg daily

## 2023-02-05 NOTE — SUBJECTIVE & OBJECTIVE
Neurologic Chief Complaint: LUE weakness and numbness    Subjective:     Interval History: Patient is seen for follow-up neurological assessment and treatment recommendations: KRYSTIAN, neuro stable- NIH 7,  Continued on DAPT, BP with better control on Norvasc, will increase to 10 mg daily. Tolerating PO with active BMs. Pt has been declining straight cath, UA done on 2/4/23 w/o any wbc or bacteruria, will need to place ramirez if he continues to decline in and out. Pending placement for IPR.      HPI, Past Medical, Family, and Social History remains the same as documented in the initial encounter.     Review of Systems   Constitutional:  Negative for fever.   HENT:  Negative for drooling and trouble swallowing.    Respiratory:  Negative for cough.    Gastrointestinal:  Negative for vomiting.   Genitourinary:  Positive for difficulty urinating.   Neurological:  Negative for weakness.   Psychiatric/Behavioral:  Positive for agitation.    Scheduled Meds:   albuterol-ipratropium  3 mL Nebulization Q6H    amLODIPine  10 mg Oral Daily    aspirin  81 mg Oral Daily    atorvastatin  40 mg Oral Daily    clopidogreL  75 mg Oral Daily    heparin (porcine)  5,000 Units Subcutaneous Q8H    levETIRAcetam  500 mg Oral BID    mupirocin   Nasal BID    nicotine  1 patch Transdermal Daily    QUEtiapine  25 mg Oral BID    senna-docusate 8.6-50 mg  1 tablet Oral BID    silodosin  8 mg Oral Daily     Continuous Infusions:   sodium chloride 0.9%       PRN Meds:acetaminophen, haloperidol lactate, ondansetron, sodium chloride 0.9%, sodium chloride 0.9%, sodium chloride 0.9%    Objective:     Vital Signs (Most Recent):  Temp: 97.7 °F (36.5 °C) (02/05/23 0316)  Pulse: 69 (02/05/23 0719)  Resp: 18 (02/05/23 0708)  BP: (!) 158/70 (02/05/23 0316)  SpO2: 96 % (02/05/23 0708)  BP Location: Left arm    Vital Signs Range (Last 24H):  Temp:  [97.4 °F (36.3 °C)-98.6 °F (37 °C)]   Pulse:  [64-91]   Resp:  [14-22]   BP: (139-166)/(70-86)   SpO2:  [91 %-97 %]    BP Location: Left arm    Physical Exam  Vitals and nursing note reviewed.   HENT:      Head: Normocephalic and atraumatic.      Nose: No rhinorrhea.   Eyes:      General: No scleral icterus.  Cardiovascular:      Rate and Rhythm: Normal rate.   Pulmonary:      Effort: Pulmonary effort is normal. No respiratory distress.   Musculoskeletal:         General: No tenderness.   Skin:     General: Skin is warm and dry.   Neurological:      Mental Status: He is alert.      Motor: Weakness present.      Comments: Moves all extremities spontaneously   Follows commands   Alert and oriented   Poor participation effort 2/2 agitation        Neurological Exam:   LOC: alert  Attention Span: poor  Language: No aphasia  Articulation: Dysarthria  Orientation: Not oriented to time  EOM (CN III, IV, VI): Full/intact  Facial Movement (CN VII): Symmetric facial expression    Motor: Moves all extremities spontaneously and antigravity; strength testing limited 2/2 poor participation and agitation   Sensation: Intact to light touch     Laboratory:  CMP:   Recent Labs   Lab 02/05/23  0351   CALCIUM 9.5   ALBUMIN 3.2*   PROT 6.6      K 3.7   CO2 19*      BUN 15   CREATININE 0.9   ALKPHOS 87   ALT 18   AST 35   BILITOT 0.7       BMP:   Recent Labs   Lab 02/05/23  0351      K 3.7      CO2 19*   BUN 15   CREATININE 0.9   CALCIUM 9.5       CBC:   Recent Labs   Lab 02/05/23  0351   WBC 5.11   RBC 5.65   HGB 17.1   HCT 52.5      MCV 93   MCH 30.3   MCHC 32.6       Lipid Panel:   Recent Labs   Lab 01/31/23  0936   CHOL 176   LDLCALC 108.2   HDL 55   TRIG 64       Coagulation: No results for input(s): PT, INR, APTT in the last 168 hours.  Platelet Aggregation Study: No results for input(s): PLTAGG, PLTAGINTERP, PLTAGREGLACO, ADPPLTAGGREG in the last 168 hours.  Hgb A1C:   Recent Labs   Lab 01/31/23  0845   HGBA1C 4.8       TSH:   Recent Labs   Lab 01/31/23  0845   TSH 2.132         Diagnostic Results     Brain Imaging    MRI Brain WO Contrast 1/31/23     Extensive foci of diffusion restriction in the right MCA distribution as above, consistent acute right MCA territory infarction.  No evidence of hemorrhagic conversion. Changes of chronic small vessel ischemic disease and cerebral volume loss.    CTH 1/31/23   1. Ill-defined areas of parenchymal hypoattenuation within the right temporoparietal region most concerning for age-indeterminate infarcts.   2. Suspected remote infarcts of the posterior right occipital lobe and bilateral basal ganglia.  3. Chronic microvascular ischemic change.    Vessel Imaging   CTA H/N 1/31/23   Acute right MCA distribution infarct.  Concern for small thrombus with stenosis and or diminished flow involving the inferior branch of the M2 segment of the right MCA. Remote left caudate head lacunar infarct. Bullous emphysematous changes.    US Carotid BL 1/31/23   Abnormal examination with elevated peak systolic velocity within the right internal carotid artery.  There is also a high resistance waveform within the right common and internal carotid arteries.  Note that the patient was moving during the examination.     Cardiac Imaging   TTE 2/1/23   The left ventricle is normal in size with concentric remodeling and normal systolic function.  The estimated ejection fraction is 68%.  Normal left ventricular diastolic function.  Normal right ventricular size with normal right ventricular systolic function.  Mechanically ventilated; cannot use inferior caval vein diameter to estimate central venous pressure.    TTE 1/31/23   There is no evidence of intracardiac shunting.  The left ventricle is normal in size with concentric remodeling and normal systolic function.  The estimated ejection fraction is 65%.  Normal left ventricular diastolic function.  Normal right ventricular size with normal right ventricular systolic function.  Normal central venous pressure (3 mmHg).  The estimated PA systolic pressure is 7  mmHg.  There is no pulmonary hypertension.

## 2023-02-05 NOTE — ASSESSMENT & PLAN NOTE
Area of cytotoxic cerebral edema identified when reviewing brain imaging. We will continue to monitor the patients clinical exam for any worsening of symptoms which may indicate expansion of the stroke or the area of the edema resulting in the clinical change. The pattern is suggestive of embolic etiology. Continue to check neuro exam q1h while in NCC and q4h when on NPU. Repeat CTH PRN for acute neuro exam changes.     Neuro exam stable at this time but will continue to monitor

## 2023-02-05 NOTE — ASSESSMENT & PLAN NOTE
Patient is a 78yo M with PMH of smoking, asthma, and BPH. He presented as a transfer from the [facility] for R MCA infarct and encephalopathy. Patient went to ED at  for LUE weakness and numbness for the past 2 weeks. He reported having difficulty walking at home. Patient required intubation due to hypoxia at OSH prior to arrival. On arrival to [facility] NIHSS 26. CTA with small thrombus with stenosis/diminshed flow through R M2. No intervention. Patient admitted to [facility]. MRI with R temporal, parietal, posterior frontal, and subinsular cortex infarcts. TTE unremarkable. He was admitted to [facility] and later extubated.     Etiology at this time ESUS. Will need 30d cardiac event monitor at discharge. Therapy recommending IPR and minced & moist diet with thin liquids. Patient pending step down to NPU. Okay with stepping down to NPU based off of patient's current clinical picture. Should any deterioration of patient's status occur, please notify the stroke team prior to transferring patient to floor. Recommend starting DAPT.     Antithrombotics for secondary stroke prevention: Antiplatelets: Aspirin: 81 mg daily and Clopidogrel: 75 mg daily     Statins for secondary stroke prevention and hyperlipidemia, if present:   Statins: Atorvastatin- 40 mg daily    Aggressive risk factor modification: HTN, HLD     Rehab efforts: IPR; Minced & Moist with thin liquids     Diagnostics ordered/pending: None     VTE prophylaxis: Heparin 5000 units SQ every 8 hours    BP parameters: Infarct: No intervention, SBP <220

## 2023-02-05 NOTE — ASSESSMENT & PLAN NOTE
Silodosin started    Hx of BPH   Declining in and out cath, with bladder residuals > 400 cc.     - If persists will place ramirez and consult urology   - Supportive care with IVFs   - Continue silodosin.

## 2023-02-05 NOTE — ASSESSMENT & PLAN NOTE
-UA with UTI on 2/1. Urine culture negative/no growth, asymptomatic  - Repeat UA on 2/4/2023 without any bacteria or wbc.

## 2023-02-05 NOTE — ASSESSMENT & PLAN NOTE
Patient is a 78yo M with PMH of smoking, asthma, and BPH. He presented as a transfer from the [facility] for R MCA infarct and encephalopathy. Patient went to ED at  for LUE weakness and numbness for the past 2 weeks. He reported having difficulty walking at home. Patient required intubation due to hypoxia at OSH prior to arrival. On arrival to [facility] NIHSS 26. CTA with small thrombus with stenosis/diminshed flow through R M2. No intervention. Patient admitted to [facility]. MRI with R temporal, parietal, posterior frontal, and subinsular cortex infarcts. TTE unremarkable. He was admitted to [facility] and later extubated.     Etiology at this time ESUS. Will need 30d cardiac event monitor at discharge. Therapy recommending IPR and minced & moist diet with thin liquids. Patient pending step down to NPU. Okay with stepping down to NPU based off of patient's current clinical picture. Should any deterioration of patient's status occur, please notify the stroke team prior to transferring patient to floor. Recommend starting DAPT.     Pt agitated and would like to leave, declining IPR.    Antithrombotics for secondary stroke prevention: Antiplatelets: Aspirin: 81 mg daily and Clopidogrel: 75 mg daily     Statins for secondary stroke prevention and hyperlipidemia, if present:   Statins: Atorvastatin- 40 mg daily    Aggressive risk factor modification: HTN, HLD     Rehab efforts: IPR however patient declining IPR, will attempt home health; Diet- Mechanical soft     Diagnostics ordered/pending: None     VTE prophylaxis: Heparin 5000 units SQ every 8 hours    BP parameters: Infarct: No intervention, SBP <220  - Norvasc 10 mg daily.    Will need outpatient follow up with bridgette upon discharge to further evaluate options for carotid disease and/ or any specific intervention.

## 2023-02-06 VITALS
TEMPERATURE: 98 F | DIASTOLIC BLOOD PRESSURE: 64 MMHG | BODY MASS INDEX: 17.21 KG/M2 | SYSTOLIC BLOOD PRESSURE: 136 MMHG | WEIGHT: 129.88 LBS | HEIGHT: 73 IN | RESPIRATION RATE: 18 BRPM | HEART RATE: 82 BPM | OXYGEN SATURATION: 96 %

## 2023-02-06 PROBLEM — I63.511 ACUTE RIGHT MCA STROKE: Status: ACTIVE | Noted: 2023-02-06

## 2023-02-06 LAB
POCT GLUCOSE: 70 MG/DL (ref 70–110)
POCT GLUCOSE: 82 MG/DL (ref 70–110)

## 2023-02-06 PROCEDURE — 99233 SBSQ HOSP IP/OBS HIGH 50: CPT | Mod: GC,,, | Performed by: PSYCHIATRY & NEUROLOGY

## 2023-02-06 PROCEDURE — 25000242 PHARM REV CODE 250 ALT 637 W/ HCPCS: Performed by: NURSE PRACTITIONER

## 2023-02-06 PROCEDURE — 99900035 HC TECH TIME PER 15 MIN (STAT)

## 2023-02-06 PROCEDURE — 94761 N-INVAS EAR/PLS OXIMETRY MLT: CPT

## 2023-02-06 PROCEDURE — S4991 NICOTINE PATCH NONLEGEND: HCPCS | Performed by: NURSE PRACTITIONER

## 2023-02-06 PROCEDURE — 94640 AIRWAY INHALATION TREATMENT: CPT

## 2023-02-06 PROCEDURE — 63600175 PHARM REV CODE 636 W HCPCS: Performed by: NURSE PRACTITIONER

## 2023-02-06 PROCEDURE — 97535 SELF CARE MNGMENT TRAINING: CPT

## 2023-02-06 PROCEDURE — 25000003 PHARM REV CODE 250: Performed by: NURSE PRACTITIONER

## 2023-02-06 PROCEDURE — 25000003 PHARM REV CODE 250: Performed by: PSYCHIATRY & NEUROLOGY

## 2023-02-06 PROCEDURE — 51798 US URINE CAPACITY MEASURE: CPT

## 2023-02-06 PROCEDURE — 25000003 PHARM REV CODE 250: Performed by: STUDENT IN AN ORGANIZED HEALTH CARE EDUCATION/TRAINING PROGRAM

## 2023-02-06 PROCEDURE — 25000003 PHARM REV CODE 250: Performed by: PHYSICIAN ASSISTANT

## 2023-02-06 PROCEDURE — 99233 PR SUBSEQUENT HOSPITAL CARE,LEVL III: ICD-10-PCS | Mod: GC,,, | Performed by: PSYCHIATRY & NEUROLOGY

## 2023-02-06 RX ORDER — CLOPIDOGREL BISULFATE 75 MG/1
75 TABLET ORAL DAILY
Qty: 90 TABLET | Refills: 3 | Status: SHIPPED | OUTPATIENT
Start: 2023-02-07 | End: 2024-02-07

## 2023-02-06 RX ORDER — AMLODIPINE BESYLATE 10 MG/1
10 TABLET ORAL DAILY
Qty: 90 TABLET | Refills: 2 | Status: SHIPPED | OUTPATIENT
Start: 2023-02-07 | End: 2024-02-07

## 2023-02-06 RX ORDER — NAPROXEN SODIUM 220 MG/1
81 TABLET, FILM COATED ORAL DAILY
Qty: 90 TABLET | Refills: 3 | Status: SHIPPED | OUTPATIENT
Start: 2023-02-07 | End: 2024-02-07

## 2023-02-06 RX ORDER — ATORVASTATIN CALCIUM 40 MG/1
40 TABLET, FILM COATED ORAL DAILY
Qty: 90 TABLET | Refills: 3 | Status: SHIPPED | OUTPATIENT
Start: 2023-02-07 | End: 2024-02-07

## 2023-02-06 RX ADMIN — SENNOSIDES AND DOCUSATE SODIUM 1 TABLET: 50; 8.6 TABLET ORAL at 09:02

## 2023-02-06 RX ADMIN — QUETIAPINE FUMARATE 25 MG: 25 TABLET ORAL at 09:02

## 2023-02-06 RX ADMIN — IPRATROPIUM BROMIDE AND ALBUTEROL SULFATE 3 ML: .5; 3 SOLUTION RESPIRATORY (INHALATION) at 12:02

## 2023-02-06 RX ADMIN — IPRATROPIUM BROMIDE AND ALBUTEROL SULFATE 3 ML: .5; 3 SOLUTION RESPIRATORY (INHALATION) at 07:02

## 2023-02-06 RX ADMIN — MUPIROCIN: 20 OINTMENT TOPICAL at 09:02

## 2023-02-06 RX ADMIN — LEVETIRACETAM 500 MG: 500 TABLET, FILM COATED ORAL at 09:02

## 2023-02-06 RX ADMIN — CLOPIDOGREL BISULFATE 75 MG: 75 TABLET ORAL at 09:02

## 2023-02-06 RX ADMIN — HEPARIN SODIUM 5000 UNITS: 5000 INJECTION INTRAVENOUS; SUBCUTANEOUS at 02:02

## 2023-02-06 RX ADMIN — ASPIRIN 81 MG: 81 TABLET, CHEWABLE ORAL at 09:02

## 2023-02-06 RX ADMIN — SILODOSIN 8 MG: 4 CAPSULE ORAL at 09:02

## 2023-02-06 RX ADMIN — NICOTINE 1 PATCH: 14 PATCH, EXTENDED RELEASE TRANSDERMAL at 09:02

## 2023-02-06 RX ADMIN — AMLODIPINE BESYLATE 10 MG: 10 TABLET ORAL at 09:02

## 2023-02-06 RX ADMIN — HEPARIN SODIUM 5000 UNITS: 5000 INJECTION INTRAVENOUS; SUBCUTANEOUS at 05:02

## 2023-02-06 RX ADMIN — ATORVASTATIN CALCIUM 40 MG: 40 TABLET, FILM COATED ORAL at 09:02

## 2023-02-06 NOTE — PT/OT/SLP PROGRESS
Speech Language Pathology      Gabriel Springer  MRN: [id number]    Patient not seen today secondary to (pt Toileting with PCT assist upon SLP attempt.) Per chart review, pt with persistent agitation. SLP services will hold on second attempt this date. Will follow-up as able and appropriate.   2/6/2023

## 2023-02-06 NOTE — PLAN OF CARE
Devin Coleman - Neurosurgery ([facility])      HOME HEALTH ORDERS  FACE TO FACE ENCOUNTER    Patient Name: Gabriel Springer  YOB: 1945    PCP: Scott Guerra Jr, MD   PCP Address: [address] [facility]  PCP Phone Number: [phone]  PCP Fax: [phone]    Encounter Date: 1/31/23    Admit to Home Health    Diagnoses:  Active Hospital Problems    Diagnosis  POA    *Embolic stroke involving right middle cerebral artery [I63.411]  Yes    Acute right MCA stroke [I63.511]  Yes    Urinary retention [R33.9]  Unknown    Urinary tract infection with hematuria [N39.0, R31.9]  No    Encephalopathy [G93.40]  Yes    Seizure [R56.9]  Yes    Tobacco abuse [Z72.0]  Yes    Asthma [J45.909]  Unknown    Prostate atrophy [N42.89]  Yes    Cytotoxic brain edema [G93.6]  Yes    Impaired activities of daily living [Z78.9]  Yes    HTN (hypertension) [I10]  Yes      Resolved Hospital Problems    Diagnosis Date Resolved POA    Endotracheally intubated [Z97.8] 02/02/2023 Yes       Follow Up Appointments:  No future appointments.    Allergies:  Review of patient's allergies indicates:   Allergen Reactions    Penicillins Hives       Medications: Review discharge medications with patient and family and provide education.    Current Facility-Administered Medications   Medication Dose Route Frequency Provider Last Rate Last Admin    acetaminophen tablet 500 mg  500 mg Oral Q6H PRN Wally Moss PA-C   500 mg at 02/05/23 0903    albuterol-ipratropium 2.5 mg-0.5 mg/3 mL nebulizer solution 3 mL  3 mL Nebulization Q6H Jerry Mckeon NP   3 mL at 02/06/23 0726    amLODIPine tablet 10 mg  10 mg Oral Daily Lalo Colin MD   10 mg at 02/06/23 0915    aspirin chewable tablet 81 mg  81 mg Oral Daily Mickie Porter NP   81 mg at 02/06/23 0915    atorvastatin tablet 40 mg  40 mg Oral Daily Mickie Porter NP   40 mg at 02/06/23 0914    clopidogreL tablet 75 mg  75 mg Oral Daily Humberto Beaver PA-C    75 mg at 02/06/23 0915    haloperidol lactate injection 5 mg  5 mg Intravenous Q6H PRN Mickie Porter NP   5 mg at 02/02/23 2239    heparin (porcine) injection 5,000 Units  5,000 Units Subcutaneous Q8H Jerry Mckeon NP   5,000 Units at 02/06/23 0510    levETIRAcetam tablet 500 mg  500 mg Oral BID Maury Cobian MD   500 mg at 02/06/23 0914    mupirocin 2 % ointment   Nasal BID Maury Cobian MD   Given at 02/06/23 0900    nicotine 14 mg/24 hr 1 patch  1 patch Transdermal Daily Mickie Porter NP   1 patch at 02/06/23 0916    ondansetron injection 4 mg  4 mg Intravenous Q8H PRN Jerry Mckeon NP        QUEtiapine tablet 25 mg  25 mg Oral BID Mickie Porter NP   25 mg at 02/06/23 0916    senna-docusate 8.6-50 mg per tablet 1 tablet  1 tablet Oral BID Mickie Porter NP   1 tablet at 02/06/23 0914    silodosin capsule 8 mg  8 mg Oral Daily Temi Landin NP   8 mg at 02/06/23 0916    sodium chloride 0.9% bolus 500 mL 500 mL  500 mL Intravenous Continuous PRN Wally ShowCOLT otero        sodium chloride 0.9% flush 10 mL  10 mL Intravenous PRN Wally ShowCOLT otero        sodium chloride 0.9% flush 10 mL  10 mL Intravenous PRN Jerry Mckeon NP         Current Discharge Medication List        START taking these medications    Details   amLODIPine (NORVASC) 10 MG tablet Take 1 tablet (10 mg total) by mouth once daily.  Qty: 90 tablet, Refills: 2    Comments: .      aspirin 81 MG Chew Take 1 tablet (81 mg total) by mouth once daily.  Qty: 90 tablet, Refills: 3      atorvastatin (LIPITOR) 40 MG tablet Take 1 tablet (40 mg total) by mouth once daily.  Qty: 90 tablet, Refills: 3      clopidogreL (PLAVIX) 75 mg tablet Take 1 tablet (75 mg total) by mouth once daily.  Qty: 90 tablet, Refills: 3           CONTINUE these medications which have NOT CHANGED    Details   albuterol (PROVENTIL/VENTOLIN HFA) 90 mcg/actuation inhaler Inhale 2 puffs into the lungs every 6 (six) hours as needed (cough  and shortness of breath). Rescue  Qty: 1 Inhaler, Refills: 0      tamsulosin (FLOMAX) 0.4 mg Cap Take 0.4 mg by mouth once daily.      UNABLE TO FIND Take 2.5 mg by mouth nightly. medication name:  sleep aid (unknown name)      albuterol sulfate 2.5 mg/0.5 mL Nebu Take 2.5 mg by nebulization every 4 (four) hours as needed. Rescue  Qty: 30 each, Refills: 0      montelukast (SINGULAIR) 10 mg tablet Take 10 mg by mouth once daily.      SYMBICORT 160-4.5 mcg/actuation HFAA Inhale 2 puffs into the lungs 2 (two) times a day.               I have seen and examined this patient within the last 30 days. My clinical findings that support the need for the home health skilled services and home bound status are the following:no   Weakness/numbness causing balance and gait disturbance due to Stroke making it taxing to leave home.  Requiring assistive device to leave home due to unsteady gait caused by  Stroke.  Patient with medication mismanagement issues requiring home bound status as evidenced by  Poor understanding of medication regimen/dosage and Poor adherence to medication regimen/dosage.     Diet:   regular diet    Labs:  No labs    Referrals/ Consults  Physical Therapy to evaluate and treat. Evaluate for home safety and equipment needs; Establish/upgrade home exercise program. Perform / instruct on therapeutic exercises, gait training, transfer training, and Range of Motion.  Occupational Therapy to evaluate and treat. Evaluate home environment for safety and equipment needs. Perform/Instruct on transfers, ADL training, ROM, and therapeutic exercises.  Speech Therapy  to evaluate and treat for  Swallowing.    Activities:   activity as tolerated    Nursing:   Agency to admit patient within 24 hours of hospital discharge unless specified on physician order or at patient request    SN to complete comprehensive assessment including routine vital signs. Instruct on disease process and s/s of complications to report to MD.  Review/verify medication list sent home with the patient at time of discharge  and instruct patient/caregiver as needed. Frequency may be adjusted depending on start of care date.     Skilled nurse to perform up to 3 visits PRN for symptoms related to diagnosis    Notify MD if SBP > 160 or < 90; DBP > 90 or < 50; HR > 120 or < 50; Temp > 101; O2 < 88%; Other:       Ok to schedule additional visits based on staff availability and patient request on consecutive days within the home health episode.    When multiple disciplines ordered:    Start of Care occurs on Sunday - Wednesday schedule remaining discipline evaluations as ordered on separate consecutive days following the start of care.    Thursday SOC -schedule subsequent evaluations Friday and Monday the following week.     Friday - Saturday SOC - schedule subsequent discipline evaluations on consecutive days starting Monday of the following week.    For all post-discharge communication and subsequent orders please contact patient's primary care physician.    Miscellaneous   N/A    Home Health Aide:  Physical Therapy Three times weekly, Occupational Therapy Three times weekly, and Speech Language Pathology Weekly    Wound Care Orders  no    I certify that this patient is confined to his home and needs physical therapy, speech therapy, and occupational therapy.

## 2023-02-06 NOTE — PLAN OF CARE
02/06/23 1250   Post-Acute Status   Post-Acute Authorization HME   HME Status Referrals Sent   Coverage [employer]   Discharge Plan   Discharge Plan A Home Health   Discharge Plan B Rehab     Therapy recommending IPR, but patient has refused this, stating that he is going home.  MD put in HH orders and order for bedside commode.  SW sent referal to [facility] and will assist the patient with getting home as needed.      2:39 PM  SW confirmed that patient's HH will begin on 2/8 and HARPREET confirmed with MD that this was ok.  DME orders sent [facility] and they will deliver to the home.  [facility]PRE[facility] sent up  transport for 1630 and RN and MD aware.      Yoselin Butterfield, ISAÍAS  [facility]  [phone]

## 2023-02-06 NOTE — ASSESSMENT & PLAN NOTE
Silodosin started    Hx of BPH   Declining in and out cath, with bladder residuals > 400 cc.    Rmairez placed on 2/5 however pt self removed, bladder scan this am shows 200cc  - Seen by urology, recommend ramirez if retention persist.    - Continue bladder scan, in and out cath if > 400 cc   - Continue silodosin.

## 2023-02-06 NOTE — PROGRESS NOTES
Urology Progress Note    Patient seen and examined.    Urology consulted for urinary retention with hx of BPH.  Pt is currently admitted to vascular neurology following recent stroke.  Herndon placed 2/5 with 400 mL of urine return, but pt self removed Herndon soon after (before balloon was inflated).  He has also been declining straight caths.  Last bladder scan from this morning with 200 mL.      Currently on silodosin 8 mg.  No recent abdominal imaging.  Last Cr yesterday was 0.9 at baseline.  Urine cx 2/4 growing coagulase-negative staph.  UA at that time neg nitrites, neg bacteria.  No UTI sx at this time.      Recommend Herndon replacement if patient goes into urinary retention.  Continue silodosin.    Please call with further questions or concerns.  Urology will sign off at this time.      Martita Trevino MD, PGY-1   [facility]

## 2023-02-06 NOTE — ASSESSMENT & PLAN NOTE
-UA with UTI on 2/1. Urine culture negative/no growth, asymptomatic  - Repeat UA on 2/4/2023 without any bacteria or wbc whoever culture grew coag negative staph which is not uncommon given the catheter usage. He reports no urinary symptoms or systems signs of infection so will not treat with abx at this time.

## 2023-02-06 NOTE — NURSING
Patient discharged for home  left unit in wheelchair accompanied by transport personell x2,discharge  instructions given

## 2023-02-06 NOTE — PLAN OF CARE
SSC met with patient/family at bedside. Patient experience rounding completed and reviewed the following.     Do you know your discharge plan? Yes    If yes, what is the plan? Home health    If you are discharging home, do you have help at home? Yes     Do you think you will need help at home at discharge?  No     Have you discussed your needs and preferences with your SW/CM? Yes     Assigned SW/CM notified of any patient/family needs or concerns.

## 2023-02-06 NOTE — DISCHARGE SUMMARY
Devin Coleman - Neurosurgery ([facility])  Vascular Neurology  Comprehensive Stroke Center  Discharge Summary     Summary:     Admit Date: 1/31/2023  7:53 AM    Discharge Date and Time:  02/06/2023 2:51 PM    Attending Physician: Jd Watt MD     Discharge Provider: Lalo Colin MD    History of Present Illness: Mr Gabriel Springer is a 77 y.o. male, here for R MCA infarct and encephalopathy as a transfer from [facility]. He has a PMHx of tobacco abuse and asthma. He presented to OSH l with a chief complaint of left arm weakness.  He reports 2 weeks of left arm weakness and numbness that has been constant without aggravating alleviating factors.  He attempted no treatment at home.  He finds he is unable to pick things up with his left hand.  He denies any slurred speech or difficulty swallowing and no numbness of his legs. He finds he has been having trouble walking at home with increasing stumbling.  He denies fever chest pain shortness of breath nausea vomiting abdominal pain leg swelling syncope dizziness dysuria melena hematuria hematemesis. While in ED OSH he had AMS and became hypoxic requiring intubation. There was concern for seizure he was given keppra 3 g and ativan 2mg. He is now admitted to [facility] for a higher level of care. History from chart due to LOC and intubation.       Hospital Course (synopsis of major diagnoses, care, treatment, and services provided during the course of the hospital stay): Patient admitted to Neuro ICU on 1/31/23 for embolic stroke of the right MCA, etiology of undetermined source. On arrival NIHSS 26, following intubation, MRI with R temporal/parietal lobe infarcts and R subinsular cortex and posterior frontal lobe, CTA with small thrombus with stenosis /diminshed flow through R M2. EEG was negative with normal EF and no WMA on echo. He was extubated on 2/1/23 and stepped down to NPU for further monitoring as well as PT/OT/SLP. He was started on DAPT, HIS with BP control. He tolerated  PO well with active bowel movements. Progressive neurological improvement (NIH 4) with PT/OT recommending IPR however the patient declined and said he would not go. While admitted patient had urinary retention, placed was ramirez and started silodosin, however patient self removed ramirez on 2/5. Patient passed self voiding trial with adequate UOP before discharge. He will need to follow up outpatient with vascular neurology for further evaluation of carotid disease and possible interventions. He will also need a cardiac event monitor for further investigation of a possible source.     2/2/2023 Extubated yesterday (2/1).  Tolerating well.  Off Precedex since 0900 today, redirectable.  Therapy recommending IPR.  Awaiting stepdown to NPU.  02/03/2023 Patient with urinary retention overnight, silodosin initiated. Patient required Haldol injections overnight. Seroquel scheduled. Patient is only on ASA at this time. Recommend DAPT. Etiology ESUS at this time. Will need cardiac event monitor at discharge. Therapy with recs for IPR and minced & moist diet with thin liquids.   2/4/2023: [facility] neuro stable, patient agitated with nursing staff and doesn't like to be bothered, continued on DAPT, BP fluctuating, will start Norvasc 5 mg. Tolerating PO. Will need placement for IPR.    2/5/2023: [facility]STIAN neuro stable- NIH 7, tolerating PO with active bowel movements. Pt has been declining straight cath, will need to place ramirez if he continues. Increasing Norvasc to 10 mg daily. Pending inpatient rehab placement.   2/6/2023: Neuro stable- NIH 5, patient pulled out ramirez overnight, BP controlled, patient declining IPR and trying to leave. Will order home health today. Patient was able to void on his own in the restroom with good UOP.     Gabriel Springer was hemodynamically stable, afebrile, with improved left sided weakness on discharge.     Brief Discharge Plan:  - Scheduled follow-up appointment with Scott Guerra Jr, MD, patient's  primary care provider  - Hospital discharge follow up was scheduled with vascular neurology in 4 weeks for carotid disease.   - Ordered Home health with PT/OT/SLP services  - Ordered Cardiac Event monitor.   - Ordered bedside commode, wheelchair, and shower chair.   - Education was provided to the patient and family regarding patient's disease and treatment options  - A comprehensive and reconciled medication list was provided on discharge to the patient   - Resumed all home medications   - Started Asa 81 mg, Plavix 75 mg, Lipitor 40 mg, Norvasc 10 mg  - Stopped no medications  - No pending labs or imaging on discharge      Pt Gabriel Springer was admitted to the medicine service on 1/31/2023 and discharged on 2/6/2023 for the treatment of: Embolic stroke of the right CVA.    Advised at discharge to:    Please follow up with your Primary Care Physician if symptoms persist and for medication management as needed.    Please return to the Emergency Department if you begin to experience worsening symptoms of weakness, altered mental status, slurred speech and if you begin to experience fever, chills, nausea or vomiting.    Please continue home medications as prescribed.        Goals of Care Treatment Preferences:  Code Status: Full Code      Stroke Etiology: Ischemic Undetermined Cause Cryptogenic Embolism / ESUS (Embolic Stroke of Undetermined Source)    STROKE DOCUMENTATION   Acute Stroke Times   Stroke Team Called Date: 01/31/23  Stroke Team Called Time: 2230  Stroke Team Arrival Date: 01/31/23  Stroke Team Arrival Time: 2235  CT Interpretation Time: 2225 (done at OSH)  Thrombolytic Therapy Recommended: No  CTA Interpretation Time: 2225 (done at OSH)  Thrombectomy Recommended: No  MRI Acute Stroke Protocol Interpretation Time: 2225 (done at OSH)     NIH Scale:  1a. Level of Consciousness: 0-->Alert, keenly responsive  1b. LOC Questions: 0-->Answers both questions correctly  1c. LOC Commands: 0-->Performs both tasks  correctly  2. Best Gaze: 1-->Partial gaze palsy, gaze is abnormal in one or both eyes, but forced deviation or total gaze paresis is not present  3. Visual: 0-->No visual loss  4. Facial Palsy: 0-->Normal symmetrical movements  5a. Motor Arm, Left: 1-->Drift, limb holds 90 (or 45) degrees, but drifts down before full 10 seconds, does not hit bed or other support  5b. Motor Arm, Right: 0-->No drift, limb holds 90 (or 45) degrees for full 10 secs  6a. Motor Leg, Left: 1-->Drift, leg falls by the end of the 5-sec period but does not hit bed  6b. Motor Leg, Right: 0-->No drift, leg holds 30 degree position for full 5 secs  7. Limb Ataxia: 0-->Absent  8. Sensory: 0-->Normal, no sensory loss  9. Best Language: 0-->No aphasia, normal  10. Dysarthria: 1-->Mild-to-moderate dysarthria, patient slurs at least some words and, at worst, can be understood with some difficulty  11. Extinction and Inattention (formerly Neglect): 0-->No abnormality  Total (NIH Stroke Scale): 4        Modified Lakshmi Score: 1  Daniel Coma Scale:    ABCD2 Score:    [id number] Score:   HAS -BLED Score:   ICH Score:   Hunt & Sierra Classification:       Assessment/Plan:     Diagnostic Results:  Embolic stroke involving right middle cerebral artery  Patient is a 76yo M with PMH of smoking, asthma, and BPH. He presented as a transfer from the [facility] for R MCA infarct and encephalopathy. Patient went to ED at  for LUE weakness and numbness for the past 2 weeks. He reported having difficulty walking at home. Patient required intubation due to hypoxia at OSH prior to arrival. On arrival to [facility] NIHSS 26. CTA with small thrombus with stenosis/diminshed flow through R M2. No intervention. Patient admitted to [facility]. MRI with R temporal, parietal, posterior frontal, and subinsular cortex infarcts. TTE unremarkable. He was admitted to [facility] and later extubated.      Etiology at this time ESUS. Will need 30d cardiac event monitor at discharge. Therapy  recommending IPR and minced & moist diet with thin liquids. Patient pending step down to NPU. Okay with stepping down to NPU based off of patient's current clinical picture. Should any deterioration of patient's status occur, please notify the stroke team prior to transferring patient to floor. Recommend starting DAPT.      Pt agitated and would like to leave, declining IPR.     Antithrombotics for secondary stroke prevention: Antiplatelets: Aspirin: 81 mg daily and Clopidogrel: 75 mg daily      Statins for secondary stroke prevention and hyperlipidemia, if present:   Statins: Atorvastatin- 40 mg daily     Aggressive risk factor modification: HTN, HLD     Rehab efforts: IPR however patient declining IPR, will attempt home health; Diet- Mechanical soft      Diagnostics ordered/pending: None      VTE prophylaxis: Heparin 5000 units SQ every 8 hours     BP parameters: Infarct: No intervention, SBP <220  - Norvasc 10 mg daily.     Will need outpatient follow up with bridgette upon discharge to further evaluate options for carotid disease and/ or any specific intervention.      Urinary retention  Silodosin started    Hx of BPH   Declining in and out cath, with bladder residuals > 400 cc.    Ramirez placed on 2/5 however pt self removed, bladder scan this am shows 200cc  - Seen by urology, recommend ramirez if retention persist.           - Continue bladder scan, in and out cath if > 400 cc          - Continue silodosin.      Urinary tract infection with hematuria  -UA with UTI on 2/1. Urine culture negative/no growth, asymptomatic  - Repeat UA on 2/4/2023 without any bacteria or wbc whoever culture grew coag negative staph which is not uncommon given the catheter usage. He reports no urinary symptoms or systems signs of infection so will not treat with abx at this time.         Seizure  On Keppra 500mg BID   EEG negative   Will continue to monitor      Encephalopathy  Has been getting seroquel 25mg BID and haldol PRN while  in [facility]        HTN (hypertension)  Stroke risk factor  Patient hypertensive as high as 245/121 on admission.  Was on cardene, stopped on 2/1 at 0843.  He is not noted to be on any BP meds at home.  SBP goal at this time < 220   BP better controlled on Norvasc          - Norvasc 10 mg daily     Cytotoxic brain edema  Area of cytotoxic cerebral edema identified when reviewing brain imaging. We will continue to monitor the patients clinical exam for any worsening of symptoms which may indicate expansion of the stroke or the area of the edema resulting in the clinical change. The pattern is suggestive of embolic etiology. Continue to check neuro exam q1h while in NCC and q4h when on NPU. Repeat CTH PRN for acute neuro exam changes.      Neuro exam stable at this time but will continue to monitor         Prostate atrophy  - started on silodosin  - Declining in and out cath with bladder scans showing >400cc  - Attempted to let the patient void on his own, however only voiding about 10-20 cc.  - Ramirez placed on 2/5, however patient removed ramirez overnight without apparent trauma.   - Bladder scan this am showed 200 cc of urine          - Will attempt voiding trial with bladder scan, in and out cath if needed      Tobacco abuse  Stroke risk factor  - on smoking cessation once appropriate    Brain Imaging   MRI Brain WO Contrast 1/31/23   Extensive foci of diffusion restriction in the right MCA distribution as above, consistent acute right MCA territory infarction.  No evidence of hemorrhagic conversion. Changes of chronic small vessel ischemic disease and cerebral volume loss.     CTH 1/31/23   1. Ill-defined areas of parenchymal hypoattenuation within the right temporoparietal region most concerning for age-indeterminate infarcts.   2. Suspected remote infarcts of the posterior right occipital lobe and bilateral basal ganglia.  3. Chronic microvascular ischemic change.     Vessel Imaging   CTA H/N 1/31/23   Acute  right MCA distribution infarct.  Concern for small thrombus with stenosis and or diminished flow involving the inferior branch of the M2 segment of the right MCA. Remote left caudate head lacunar infarct. Bullous emphysematous changes.     US Carotid BL 1/31/23   Abnormal examination with elevated peak systolic velocity within the right internal carotid artery.  There is also a high resistance waveform within the right common and internal carotid arteries.  Note that the patient was moving during the examination.      Cardiac Imaging   TTE 2/1/23    The left ventricle is normal in size with concentric remodeling and normal systolic function.   The estimated ejection fraction is 68%.   Normal left ventricular diastolic function.   Normal right ventricular size with normal right ventricular systolic function.   Mechanically ventilated; cannot use inferior caval vein diameter to estimate central venous pressure.     TTE 1/31/23    There is no evidence of intracardiac shunting.   The left ventricle is normal in size with concentric remodeling and normal systolic function.   The estimated ejection fraction is 65%.   Normal left ventricular diastolic function.   Normal right ventricular size with normal right ventricular systolic function.   Normal central venous pressure (3 mmHg).   The estimated PA systolic pressure is 7 mmHg.   There is no pulmonary hypertension.      Interventions: None    Complications: None    Disposition: Home-Health Care [facility]    Final Active Diagnoses:    Diagnosis Date Noted POA    PRINCIPAL PROBLEM:  Embolic stroke involving right middle cerebral artery [I63.411] 01/31/2023 Yes    Urinary retention [R33.9] 02/03/2023 Unknown    Urinary tract infection with hematuria [N39.0, R31.9] 02/02/2023 No    Encephalopathy [G93.40] 02/01/2023 Yes    Seizure [R56.9] 02/01/2023 Yes    Tobacco abuse [Z72.0] 01/31/2023 Yes    Asthma [J45.909] 01/31/2023 Unknown    Prostate atrophy [N42.89]  "01/31/2023 Yes    Cytotoxic brain edema [G93.6] 01/31/2023 Yes    Impaired activities of daily living [Z78.9] 01/31/2023 Yes    HTN (hypertension) [I10] 01/31/2023 Yes      Problems Resolved During this Admission:    Diagnosis Date Noted Date Resolved POA    Endotracheally intubated [Z97.8] 01/31/2023 02/02/2023 Yes     No new Assessment & Plan notes have been filed under this hospital service since the last note was generated.  Service: Vascular Neurology      Recommendations:     Post-discharge complication risks: Falls    Stroke Education given to: patient    Follow-up in Stroke Clinic in 4-6 weeks.     Discharge Plan:  Antithrombotics: Aspirin 81mg, Clopidogrel 75mg  Statin: Atorvastatin 40mg  Smoking Cessation  Aggresive risk factor modification:  Hypertension  Smoking    Follow Up:   Follow-up Information     Scott Guerra Jr, MD Follow up in 1 week(s).    Specialties: Hospitalist, Family Medicine  Why: Hospital Follow Up  Contact information:  [address]  [facility] [address]  [phone]                         Patient Instructions:      COMMODE FOR HOME USE     Order Specific Question Answer Comments   Type: Standard    Height: 6' 1" (1.854 m)    Weight: 58.9 kg (129 lb 13.6 oz)    Does patient have medical equipment at home? none    Length of need (1-99 months): 99      BATH/SHOWER CHAIR FOR HOME USE     Order Specific Question Answer Comments   Height: 6' 1" (1.854 m)    Weight: 58.9 kg (129 lb 13.6 oz)    Does patient have medical equipment at home? none    Length of need (1-99 months): 99    Type: With back      WHEELCHAIR FOR HOME USE     Order Specific Question Answer Comments   Hours in W/C per day: 4    Type of Wheelchair: Standard    Size(Width): 18"(STD adult)    Leg Support: STD footrests    Lap Belt: Buckle    Accessories: Front brakes    Cushion: Basic    Height: 6' 1" (1.854 m)    Weight: 58.9 kg (129 lb 13.6 oz)    Does patient have " medical equipment at home? none    Length of need (1-99 months): 99    Please check all that apply: Caregiver is capable and willing to operate wheelchair safely.      Ambulatory referral/consult to Vascular Neurology   Standing Status: Future   Referral Priority: Routine Referral Type: Consultation   Referral Reason: Specialty Services Required   Requested Specialty: Vascular Neurology   Number of Visits Requested: 1     Diet Dysphagia Mechanical Soft     Notify your health care provider if you experience any of the following:  temperature >100.4     Notify your health care provider if you experience any of the following:  persistent nausea and vomiting or diarrhea     Notify your health care provider if you experience any of the following:  severe uncontrolled pain     Notify your health care provider if you experience any of the following:  difficulty breathing or increased cough     Notify your health care provider if you experience any of the following:  severe persistent headache     Notify your health care provider if you experience any of the following:  worsening rash     Notify your health care provider if you experience any of the following:  persistent dizziness, light-headedness, or visual disturbances     Notify your health care provider if you experience any of the following:  increased confusion or weakness     Cardiac event monitor   Standing Status: Future Standing Exp. Date: 02/06/24     Order Specific Question Answer Comments   Cardiac Event Monitor Auto Trigger    Release to patient Immediate      Activity as tolerated       Medications:  Reconciled Home Medications:      Medication List      START taking these medications    amLODIPine 10 MG tablet  Commonly known as: NORVASC  Take 1 tablet (10 mg total) by mouth once daily.  Start taking on: February 7, 2023     aspirin 81 MG Chew  Take 1 tablet (81 mg total) by mouth once daily.  Start taking on: February 7, 2023     atorvastatin 40 MG  tablet  Commonly known as: LIPITOR  Take 1 tablet (40 mg total) by mouth once daily.  Start taking on: February 7, 2023     clopidogreL 75 mg tablet  Commonly known as: PLAVIX  Take 1 tablet (75 mg total) by mouth once daily.  Start taking on: February 7, 2023        CONTINUE taking these medications    * albuterol 90 mcg/actuation inhaler  Commonly known as: PROVENTIL/VENTOLIN HFA  Inhale 2 puffs into the lungs every 6 (six) hours as needed (cough and shortness of breath). Rescue     * albuterol sulfate 2.5 mg/0.5 mL Nebu  Take 2.5 mg by nebulization every 4 (four) hours as needed. Rescue     montelukast 10 mg tablet  Commonly known as: SINGULAIR  Take 10 mg by mouth once daily.     SYMBICORT 160-4.5 mcg/actuation Hfaa  Generic drug: budesonide-formoterol 160-4.5 mcg  Inhale 2 puffs into the lungs 2 (two) times a day.     tamsulosin 0.4 mg Cap  Commonly known as: FLOMAX  Take 0.4 mg by mouth once daily.     UNABLE TO FIND  Take 2.5 mg by mouth nightly. medication name:  sleep aid (unknown name)         * This list has 2 medication(s) that are the same as other medications prescribed for you. Read the directions carefully, and ask your doctor or other care provider to review them with you.                Lalo Colin MD  Comprehensive Stroke Center  Department of Vascular Neurology   [facility]

## 2023-02-06 NOTE — SUBJECTIVE & OBJECTIVE
Neurologic Chief Complaint: LUE weakness and numbness    Subjective:     Interval History: Patient is seen for follow-up neurological assessment and treatment recommendations: Neuro stable- NIH 4, patient pulled out ramirez overnight- seen by urology, BP controlled, patient declining IPR and trying to leave. Will order home health today.      HPI, Past Medical, Family, and Social History remains the same as documented in the initial encounter.     Review of Systems   Constitutional:  Negative for fever.   HENT:  Negative for drooling and trouble swallowing.    Respiratory:  Negative for cough.    Gastrointestinal:  Negative for vomiting.   Genitourinary:  Positive for difficulty urinating.   Neurological:  Negative for weakness.   Psychiatric/Behavioral:  Positive for agitation.    Scheduled Meds:   albuterol-ipratropium  3 mL Nebulization Q6H    amLODIPine  10 mg Oral Daily    aspirin  81 mg Oral Daily    atorvastatin  40 mg Oral Daily    clopidogreL  75 mg Oral Daily    heparin (porcine)  5,000 Units Subcutaneous Q8H    levETIRAcetam  500 mg Oral BID    mupirocin   Nasal BID    nicotine  1 patch Transdermal Daily    QUEtiapine  25 mg Oral BID    senna-docusate 8.6-50 mg  1 tablet Oral BID    silodosin  8 mg Oral Daily     Continuous Infusions:   sodium chloride 0.9%       PRN Meds:acetaminophen, haloperidol lactate, ondansetron, sodium chloride 0.9%, sodium chloride 0.9%, sodium chloride 0.9%    Objective:     Vital Signs (Most Recent):  Temp: 97.2 °F (36.2 °C) (02/06/23 0804)  Pulse: 63 (02/06/23 1046)  Resp: 18 (02/06/23 0804)  BP: (!) 152/74 (02/06/23 0804)  SpO2: (!) 92 % (02/06/23 0804)  BP Location: Left arm    Vital Signs Range (Last 24H):  Temp:  [97.2 °F (36.2 °C)-98.6 °F (37 °C)]   Pulse:  [63-89]   Resp:  [8-20]   BP: (128-152)/(58-74)   SpO2:  [92 %-98 %]   BP Location: Left arm    Physical Exam  Vitals and nursing note reviewed.   HENT:      Head: Normocephalic and atraumatic.      Nose: No rhinorrhea.    Eyes:      General: No scleral icterus.  Cardiovascular:      Rate and Rhythm: Normal rate.   Pulmonary:      Effort: Pulmonary effort is normal. No respiratory distress.   Musculoskeletal:         General: No tenderness.   Skin:     General: Skin is warm and dry.   Neurological:      Mental Status: He is alert.      Motor: Weakness present.      Comments: Moves all extremities spontaneously   Follows commands   Alert and oriented   Poor participation effort 2/2 agitation        Neurological Exam:   LOC: alert  Attention Span: poor  Language: No aphasia  Articulation: Dysarthria  Orientation: Not oriented to time  EOM (CN III, IV, VI): Full/intact  Facial Movement (CN VII): Symmetric facial expression    Motor: Moves all extremities spontaneously and antigravity; strength testing limited 2/2 poor participation and agitation   Sensation: Intact to light touch     Laboratory:  CMP:   No results for input(s): GLUCOSE, CALCIUM, ALBUMIN, PROT, NA, K, CO2, CL, BUN, CREATININE, ALKPHOS, ALT, AST, BILITOT in the last 24 hours.    BMP:   Recent Labs   Lab 02/05/23  0351      K 3.7      CO2 19*   BUN 15   CREATININE 0.9   CALCIUM 9.5       CBC:   Recent Labs   Lab 02/05/23  0351   WBC 5.11   RBC 5.65   HGB 17.1   HCT 52.5      MCV 93   MCH 30.3   MCHC 32.6       Lipid Panel:   Recent Labs   Lab 01/31/23  0936   CHOL 176   LDLCALC 108.2   HDL 55   TRIG 64       Coagulation: No results for input(s): PT, INR, APTT in the last 168 hours.  Platelet Aggregation Study: No results for input(s): PLTAGG, PLTAGINTERP, PLTAGREGLACO, ADPPLTAGGREG in the last 168 hours.  Hgb A1C:   Recent Labs   Lab 01/31/23  0845   HGBA1C 4.8       TSH:   Recent Labs   Lab 01/31/23  0845   TSH 2.132         Diagnostic Results     Brain Imaging   MRI Brain WO Contrast 1/31/23     Extensive foci of diffusion restriction in the right MCA distribution as above, consistent acute right MCA territory infarction.  No evidence of hemorrhagic  conversion. Changes of chronic small vessel ischemic disease and cerebral volume loss.    CTH 1/31/23   1. Ill-defined areas of parenchymal hypoattenuation within the right temporoparietal region most concerning for age-indeterminate infarcts.   2. Suspected remote infarcts of the posterior right occipital lobe and bilateral basal ganglia.  3. Chronic microvascular ischemic change.    Vessel Imaging   CTA H/N 1/31/23   Acute right MCA distribution infarct.  Concern for small thrombus with stenosis and or diminished flow involving the inferior branch of the M2 segment of the right MCA. Remote left caudate head lacunar infarct. Bullous emphysematous changes.    US Carotid BL 1/31/23   Abnormal examination with elevated peak systolic velocity within the right internal carotid artery.  There is also a high resistance waveform within the right common and internal carotid arteries.  Note that the patient was moving during the examination.     Cardiac Imaging   TTE 2/1/23   The left ventricle is normal in size with concentric remodeling and normal systolic function.  The estimated ejection fraction is 68%.  Normal left ventricular diastolic function.  Normal right ventricular size with normal right ventricular systolic function.  Mechanically ventilated; cannot use inferior caval vein diameter to estimate central venous pressure.    TTE 1/31/23   There is no evidence of intracardiac shunting.  The left ventricle is normal in size with concentric remodeling and normal systolic function.  The estimated ejection fraction is 65%.  Normal left ventricular diastolic function.  Normal right ventricular size with normal right ventricular systolic function.  Normal central venous pressure (3 mmHg).  The estimated PA systolic pressure is 7 mmHg.  There is no pulmonary hypertension.

## 2023-02-06 NOTE — NURSING
PT vehemently refused to have a new ramirez catheter put in.  The Dr/JAMIE from the [facility] [place] [facility] has been notified.

## 2023-02-06 NOTE — ASSESSMENT & PLAN NOTE
- started on silodosin  - Declining in and out cath with bladder scans showing >400cc  - Attempted to let the patient void on his own, however only voiding about 10-20 cc.  - Ramirez placed on 2/5, however patient removed ramirez overnight without apparent trauma.   - Bladder scan this am showed 200 cc of urine   - Will attempt voiding trial with bladder scan, in and out cath if needed

## 2023-02-06 NOTE — PT/OT/SLP PROGRESS
Physical Therapy      Patient Name:  Gabriel Springer   MRN:  [id number]    Patient not seen today secondary to Increased agitation. Pt observed from hallway attempting to get out of bed, [employer] camera alarming. RNs/PCTs addressing as needed. Pt yelling at camera. Per conversation with charge RN, pt agitated and refusing several aspects of care. PT to hold. Will follow-up once appropriate to participate in therapy intervention.     Writing therapist contacted regarding pts post acute recommendation and equipment recommendation. Discussed face-to-face with PT on neuro unit. Pt remains recommended for inpatient rehabilitation and at this time would not be safe to return home as pt lives alone and requires assistance for functional mobility.

## 2023-02-06 NOTE — PT/OT/SLP PROGRESS
"Occupational Therapy   Treatment    Name: Gabriel Springer  MRN: [id number]  Admitting Diagnosis:  Embolic stroke involving right middle cerebral artery       Recommendations:     Discharge Recommendations: rehabilitation facility  Discharge Equipment Recommendations:  bath bench, bedside commode  Barriers to discharge:  None    Assessment:     Gabriel Springer is a 77 y.o. male with a medical diagnosis of Embolic stroke involving right middle cerebral artery.  He presents with performance deficits affecting function are weakness, impaired endurance, visual deficits, impaired self care skills, impaired functional mobility, impaired balance, gait instability, decreased upper extremity function, decreased lower extremity function, decreased safety awareness, impaired cognition, decreased coordination.     Rehab Prognosis:  Good; patient would benefit from acute skilled OT services to address these deficits and reach maximum level of function.       Plan:     Patient to be seen 3 x/week to address the above listed problems via therapeutic exercises, sensory integration, therapeutic activities, self-care/home management, neuromuscular re-education  Plan of Care Expires: 03/01/23  Plan of Care Reviewed with: patient    Subjective   Patient:  "I am ready to go home."  Pain/Comfort:  Pain Rating 1: 0/10  Pain Rating Post-Intervention 1: 0/10    Objective:     Communicated with: Nurse prior to session.  Patient found supine with bed alarm, peripheral IV, telemetry, Condom Catheter, pulse ox (continuous) upon OT entry to room.    General Precautions: Standard, aspiration, fall    Orthopedic Precautions:N/A  Braces: N/A  Respiratory Status: Room air     Occupational Performance:     Bed Mobility:    Patient completed Rolling/Turning to Left with  supervision  Patient completed Rolling/Turning to Right with supervision  Patient completed Supine to Sit with supervision  Patient completed Sit to Supine with supervision     Functional " Mobility/Transfers:  Patient completed Sit <> Stand Transfer with contact guard assistance  with  no assistive device   Patient completed Bed <> Chair Transfer using Stand Pivot technique with contact guard assistance with no assistive device    Activities of Daily Living:  Lower Body Dressing: contact guard assistance for standing balance    [facility] 6 Click ADL: 18    Treatment & Education:  Patient alert and oriented x 3; able to follow 4/4 one step commands.  Patient attentive and interactive throughout the session. Addressed thoracic and cervical extension while seated and standing during ADLs.    Patient left supine with all lines intact, call button in reach, and bed alarm on    GOALS:   Multidisciplinary Problems       Occupational Therapy Goals          Problem: Occupational Therapy    Goal Priority Disciplines Outcome Interventions   Occupational Therapy Goal     OT, PT/OT Ongoing, Progressing    Description: Goals set 2/6 to be addressed for 14 days with expiration date, 2/20:  Patient will increase functional independence with ADLs by performing:    Patient will demonstrate rolling to the right with modified independence.  Not met   Patient will demonstrate rolling to the left with modified independence.   Not met  Patient will demonstrate supine -sit with modified independence.   Not met  Patient will demonstrate stand pivot transfers with modified independence   Not met  Patient will demonstrate grooming while standing with SBA.   Not met  Patient will demonstrate upper body dressing with modified independence assist while seated EOB.   Not met  Patient will demonstrate lower body dressing with modified independence while seated EOB.   Not met  Patient will demonstrate toileting with SBA.   Not met  Patient will demonstrate bathing while seated EOB with sBA.   Not met  Patient's family / caregiver will demonstrate independence and safety with assisting patient with self-care skills and functional  mobility.     Not met  Patient's family / caregiver will demonstrate independence with providing ROM and changes in bed positioning.   Not met  Patient and/or patient's family will verbalize understanding of stroke prevention guidelines, personal risk factors and stroke warning signs via teachback method.  Not met                                  Time Tracking:     OT Date of Treatment: 02/06/23  OT Start Time: 0700  OT Stop Time: 0711  OT Total Time (min): 11 min    Billable Minutes:Self Care/Home Management 11    OT/MUSA: OT          2/6/2023

## 2023-02-06 NOTE — PROGRESS NOTES
Devin Coleman - Neurosurgery ([facility])  Vascular Neurology  Comprehensive Stroke Center  Progress Note    Assessment/Plan:     * Embolic stroke involving right middle cerebral artery  Patient is a 76yo M with PMH of smoking, asthma, and BPH. He presented as a transfer [place] the [facility] [place] for R MCA infarct and encephalopathy. Patient went to ED at  for LUE weakness and numbness for the past 2 weeks. He reported having difficulty walking at home. Patient required intubation due to hypoxia at OSH prior to arrival. On arrival to [facility] NIHSS 26. CTA with small thrombus with stenosis/diminshed flow through R M2. No intervention. Patient admitted to [facility]. MRI with R temporal, parietal, posterior frontal, and subinsular cortex infarcts. TTE unremarkable. He was admitted to [facility] and later extubated.     Etiology at this time ESUS. Will need 30d cardiac event monitor at discharge. Therapy recommending IPR and minced & moist diet with thin liquids. Patient pending step down to NPU. Okay with stepping down to NPU based off of patient's current clinical picture. Should any deterioration of patient's status occur, please notify the stroke team prior to transferring patient to floor. Recommend starting DAPT.     Pt agitated and would like to leave, declining IPR.    Antithrombotics for secondary stroke prevention: Antiplatelets: Aspirin: 81 mg daily and Clopidogrel: 75 mg daily     Statins for secondary stroke prevention and hyperlipidemia, if present:   Statins: Atorvastatin- 40 mg daily    Aggressive risk factor modification: HTN, HLD     Rehab efforts: IPR however patient declining IPR, will attempt home health; Diet- Mechanical soft     Diagnostics ordered/pending: None     VTE prophylaxis: Heparin 5000 units SQ every 8 hours    BP parameters: Infarct: No intervention, SBP <220  - Norvasc 10 mg daily.    Will need outpatient follow up with bridgette upon discharge to further evaluate options for carotid disease and/ or any  specific intervention.     Urinary retention  Silodosin started    Hx of BPH   Declining in and out cath, with bladder residuals > 400 cc.    Ramirez placed on 2/5 however pt self removed, bladder scan this am shows 200cc  - Seen by urology, recommend ramirez if retention persist.    - Continue bladder scan, in and out cath if > 400 cc   - Continue silodosin.     Urinary tract infection with hematuria  -UA with UTI on 2/1. Urine culture negative/no growth, asymptomatic  - Repeat UA on 2/4/2023 without any bacteria or wbc whoever culture grew coag negative staph which is not uncommon given the catheter usage. He reports no urinary symptoms or systems signs of infection so will not treat with abx at this time.       Seizure  On Keppra 500mg BID   EEG negative   Will continue to monitor     Encephalopathy  Has been getting seroquel 25mg BID and haldol PRN while in [facility]      HTN (hypertension)  Stroke risk factor  Patient hypertensive as high as 245/121 on admission.  Was on cardene, stopped on 2/1 at 0843.  He is not noted to be on any BP meds at home.  SBP goal at this time < 220   BP better controlled on Norvasc   - Norvasc 10 mg daily    Cytotoxic brain edema  Area of cytotoxic cerebral edema identified when reviewing brain imaging. We will continue to monitor the patients clinical exam for any worsening of symptoms which may indicate expansion of the stroke or the area of the edema resulting in the clinical change. The pattern is suggestive of embolic etiology. Continue to check neuro exam q1h while in [facility] and q4h when on NPU. Repeat CTH PRN for acute neuro exam changes.     Neuro exam stable at this time but will continue to monitor       Prostate atrophy  - started on silodosin  - Declining in and out cath with bladder scans showing >400cc  - Attempted to let the patient void on his own, however only voiding about 10-20 cc.  - Ramirez placed on 2/5, however patient removed ramirez overnight without apparent trauma.   -  Bladder scan this am showed 200 cc of urine   - Will attempt voiding trial with bladder scan, in and out cath if needed     Tobacco abuse  Stroke risk factor  - on smoking cessation once appropriate         2/2/2023 Extubated yesterday (2/1).  Tolerating well.  Off Precedex since 0900 today, redirectable.  Therapy recommending IPR.  Awaiting stepdown to NPU.  02/03/2023 Patient with urinary retention overnight, silodosin initiated. Patient required Haldol injections overnight. Seroquel scheduled. Patient is only on ASA at this time. Recommend DAPT. Etiology ESUS at this time. Will need cardiac event monitor at discharge. Therapy with recs for IPR and minced & moist diet with thin liquids.   2/4/2023: KRYSTIAN, neuro stable, patient agitated with nursing staff and doesn't like to be bothered, continued on DAPT, BP fluctuating, will start Norvasc 5 mg. Tolerating PO. Will need placement for IPR.    2/5/2023: KRYSTIAN neuro stable- NIH 7, tolerating PO with active bowel movements. Pt has been declining straight cath, will need to place ramirez if he continues. Increasing Norvasc to 10 mg daily. Pending inpatient rehab placement.   2/6/2023: Neuro stable- NIH 5, patient pulled out ramirez overnight, BP controlled, patient declining IPR and trying to leave. Will order home health today.       STROKE DOCUMENTATION   Acute Stroke Times   Stroke Team Called Date: 01/31/23  Stroke Team Called Time: 2230  Stroke Team Arrival Date: 01/31/23  Stroke Team Arrival Time: 2235  CT Interpretation Time: 2225 (done at OSH)  Thrombolytic Therapy Recommended: No  CTA Interpretation Time: 2225 (done at OSH)  Thrombectomy Recommended: No  MRI Acute Stroke Protocol Interpretation Time: 2225 (done at OSH)    NIH Scale:  1a. Level of Consciousness: 0-->Alert, keenly responsive  1b. LOC Questions: 0-->Answers both questions correctly  1c. LOC Commands: 0-->Performs both tasks correctly  2. Best Gaze: 1-->Partial gaze palsy, gaze is abnormal in  one or both eyes, but forced deviation or total gaze paresis is not present  3. Visual: 0-->No visual loss  4. Facial Palsy: 0-->Normal symmetrical movements  5a. Motor Arm, Left: 1-->Drift, limb holds 90 (or 45) degrees, but drifts down before full 10 seconds, does not hit bed or other support  5b. Motor Arm, Right: 0-->No drift, limb holds 90 (or 45) degrees for full 10 secs  6a. Motor Leg, Left: 1-->Drift, leg falls by the end of the 5-sec period but does not hit bed  6b. Motor Leg, Right: 0-->No drift, leg holds 30 degree position for full 5 secs  7. Limb Ataxia: 0-->Absent  8. Sensory: 0-->Normal, no sensory loss  9. Best Language: 0-->No aphasia, normal  10. Dysarthria: 1-->Mild-to-moderate dysarthria, patient slurs at least some words and, at worst, can be understood with some difficulty  11. Extinction and Inattention (formerly Neglect): 0-->No abnormality  Total (NIH Stroke Scale): 4       Modified [place] Score: 1  [place] Coma Scale:    ABCD2 Score:    [id number] Score:   HAS -BLED Score:   ICH Score:   Hunt & Sierra Classification:      Hemorrhagic change of an Ischemic Stroke: Does this patient have an ischemic stroke with hemorrhagic changes? No     Neurologic Chief Complaint: LUE weakness and numbness    Subjective:     Interval History: Patient is seen for follow-up neurological assessment and treatment recommendations: Neuro stable- NIH 4, patient pulled out ramirez overnight- seen by urology, BP controlled, patient declining IPR and trying to leave. Will order home health today.      HPI, Past Medical, Family, and Social History remains the same as documented in the initial encounter.     Review of Systems   Constitutional:  Negative for fever.   HENT:  Negative for drooling and trouble swallowing.    Respiratory:  Negative for cough.    Gastrointestinal:  Negative for vomiting.   Genitourinary:  Positive for difficulty urinating.   Neurological:  Negative for weakness.   Psychiatric/Behavioral:   Positive for agitation.    Scheduled Meds:   albuterol-ipratropium  3 mL Nebulization Q6H    amLODIPine  10 mg Oral Daily    aspirin  81 mg Oral Daily    atorvastatin  40 mg Oral Daily    clopidogreL  75 mg Oral Daily    heparin (porcine)  5,000 Units Subcutaneous Q8H    levETIRAcetam  500 mg Oral BID    mupirocin   Nasal BID    nicotine  1 patch Transdermal Daily    QUEtiapine  25 mg Oral BID    senna-docusate 8.6-50 mg  1 tablet Oral BID    silodosin  8 mg Oral Daily     Continuous Infusions:   sodium chloride 0.9%       PRN Meds:acetaminophen, haloperidol lactate, ondansetron, sodium chloride 0.9%, sodium chloride 0.9%, sodium chloride 0.9%    Objective:     Vital Signs (Most Recent):  Temp: 97.2 °F (36.2 °C) (02/06/23 0804)  Pulse: 63 (02/06/23 1046)  Resp: 18 (02/06/23 0804)  BP: (!) 152/74 (02/06/23 0804)  SpO2: (!) 92 % (02/06/23 0804)  BP Location: Left arm    Vital Signs Range (Last 24H):  Temp:  [97.2 °F (36.2 °C)-98.6 °F (37 °C)]   Pulse:  [63-89]   Resp:  [8-20]   BP: (128-152)/(58-74)   SpO2:  [92 %-98 %]   BP Location: Left arm    Physical Exam  Vitals and nursing note reviewed.   HENT:      Head: Normocephalic and atraumatic.      Nose: No rhinorrhea.   Eyes:      General: No scleral icterus.  Cardiovascular:      Rate and Rhythm: Normal rate.   Pulmonary:      Effort: Pulmonary effort is normal. No respiratory distress.   Musculoskeletal:         General: No tenderness.   Skin:     General: Skin is warm and dry.   Neurological:      Mental Status: He is alert.      Motor: Weakness present.      Comments: Moves all extremities spontaneously   Follows commands   Alert and oriented   Poor participation effort 2/2 agitation        Neurological Exam:   LOC: alert  Attention Span: poor  Language: No aphasia  Articulation: Dysarthria  Orientation: Not oriented to time  EOM (CN III, IV, VI): Full/intact  Facial Movement (CN VII): Symmetric facial expression    Motor: Moves all extremities  spontaneously and antigravity; strength testing limited 2/2 poor participation and agitation   Sensation: Intact to light touch     Laboratory:  CMP:   No results for input(s): GLUCOSE, CALCIUM, ALBUMIN, PROT, NA, K, CO2, CL, BUN, CREATININE, ALKPHOS, ALT, AST, BILITOT in the last 24 hours.    BMP:   Recent Labs   Lab 02/05/23  0351      K 3.7      CO2 19*   BUN 15   CREATININE 0.9   CALCIUM 9.5       CBC:   Recent Labs   Lab 02/05/23  0351   WBC 5.11   RBC 5.65   HGB 17.1   HCT 52.5      MCV 93   MCH 30.3   MCHC 32.6       Lipid Panel:   Recent Labs   Lab 01/31/23  0936   CHOL 176   LDLCALC 108.2   HDL 55   TRIG 64       Coagulation: No results for input(s): PT, INR, APTT in the last 168 hours.  Platelet Aggregation Study: No results for input(s): PLTAGG, PLTAGINTERP, PLTAGREGLACO, ADPPLTAGGREG in the last 168 hours.  Hgb A1C:   Recent Labs   Lab 01/31/23  0845   HGBA1C 4.8       TSH:   Recent Labs   Lab 01/31/23  0845   TSH 2.132         Diagnostic Results     Brain Imaging   MRI Brain WO Contrast 1/31/23     Extensive foci of diffusion restriction in the right MCA distribution as above, consistent acute right MCA territory infarction.  No evidence of hemorrhagic conversion. Changes of chronic small vessel ischemic disease and cerebral volume loss.    CTH 1/31/23   1. Ill-defined areas of parenchymal hypoattenuation within the right temporoparietal region most concerning for age-indeterminate infarcts.   2. Suspected remote infarcts of the posterior right occipital lobe and bilateral basal ganglia.  3. Chronic microvascular ischemic change.    Vessel Imaging   CTA H/N 1/31/23   Acute right MCA distribution infarct.  Concern for small thrombus with stenosis and or diminished flow involving the inferior branch of the M2 segment of the right MCA. Remote left caudate head lacunar infarct. Bullous emphysematous changes.    US Carotid BL 1/31/23   Abnormal examination with elevated peak systolic  velocity within the right internal carotid artery.  There is also a high resistance waveform within the right common and internal carotid arteries.  Note that the patient was moving during the examination.     Cardiac Imaging   TTE 2/1/23    The left ventricle is normal in size with concentric remodeling and normal systolic function.   The estimated ejection fraction is 68%.   Normal left ventricular diastolic function.   Normal right ventricular size with normal right ventricular systolic function.   Mechanically ventilated; cannot use inferior caval vein diameter to estimate central venous pressure.    TTE 1/31/23    There is no evidence of intracardiac shunting.   The left ventricle is normal in size with concentric remodeling and normal systolic function.   The estimated ejection fraction is 65%.   Normal left ventricular diastolic function.   Normal right ventricular size with normal right ventricular systolic function.   Normal central venous pressure (3 mmHg).   The estimated PA systolic pressure is 7 mmHg.   There is no pulmonary hypertension.      Lalo Colin MD  Comprehensive Stroke Center  Department of Vascular Neurology   [facility]

## 2023-02-07 ENCOUNTER — HOSPITAL ENCOUNTER (OUTPATIENT)
Facility: HOSPITAL | Age: 78
Discharge: NURSING, REHAB OR LONG-TERM CARE | End: 2023-02-09
Attending: EMERGENCY MEDICINE | Admitting: PSYCHIATRY & NEUROLOGY
Payer: MEDICARE

## 2023-02-07 ENCOUNTER — PATIENT OUTREACH (OUTPATIENT)
Dept: ADMINISTRATIVE | Facility: CLINIC | Age: 78
End: 2023-02-07
Payer: MEDICARE

## 2023-02-07 DIAGNOSIS — Z86.73 HISTORY OF CVA (CEREBROVASCULAR ACCIDENT): ICD-10-CM

## 2023-02-07 DIAGNOSIS — R68.89 FORGETFULNESS: Primary | ICD-10-CM

## 2023-02-07 LAB
ALBUMIN SERPL BCP-MCNC: 3.8 G/DL (ref 3.5–5.2)
ALP SERPL-CCNC: 100 U/L (ref 55–135)
ALT SERPL W/O P-5'-P-CCNC: 29 U/L (ref 10–44)
ANION GAP SERPL CALC-SCNC: 14 MMOL/L (ref 8–16)
AST SERPL-CCNC: 43 U/L (ref 10–40)
BACTERIA #/AREA URNS AUTO: ABNORMAL /HPF
BASOPHILS # BLD AUTO: 0.02 K/UL (ref 0–0.2)
BASOPHILS NFR BLD: 0.4 % (ref 0–1.9)
BILIRUB SERPL-MCNC: 0.7 MG/DL (ref 0.1–1)
BILIRUB UR QL STRIP: ABNORMAL
BUN SERPL-MCNC: 13 MG/DL (ref 8–23)
CALCIUM SERPL-MCNC: 10.1 MG/DL (ref 8.7–10.5)
CHLORIDE SERPL-SCNC: 99 MMOL/L (ref 95–110)
CLARITY UR REFRACT.AUTO: ABNORMAL
CO2 SERPL-SCNC: 26 MMOL/L (ref 23–29)
COLOR UR AUTO: YELLOW
CREAT SERPL-MCNC: 1 MG/DL (ref 0.5–1.4)
DIFFERENTIAL METHOD: ABNORMAL
EOSINOPHIL # BLD AUTO: 0.2 K/UL (ref 0–0.5)
EOSINOPHIL NFR BLD: 3.8 % (ref 0–8)
ERYTHROCYTE [DISTWIDTH] IN BLOOD BY AUTOMATED COUNT: 14.7 % (ref 11.5–14.5)
EST. GFR  (NO RACE VARIABLE): >60 ML/MIN/1.73 M^2
GLUCOSE SERPL-MCNC: 92 MG/DL (ref 70–110)
GLUCOSE UR QL STRIP: NEGATIVE
HCT VFR BLD AUTO: 55.5 % (ref 40–54)
HCV AB SERPL QL IA: NORMAL
HGB BLD-MCNC: 18.1 G/DL (ref 14–18)
HGB UR QL STRIP: ABNORMAL
HIV 1+2 AB+HIV1 P24 AG SERPL QL IA: NORMAL
HYALINE CASTS UR QL AUTO: 0 /LPF
IMM GRANULOCYTES # BLD AUTO: 0.01 K/UL (ref 0–0.04)
IMM GRANULOCYTES NFR BLD AUTO: 0.2 % (ref 0–0.5)
KETONES UR QL STRIP: ABNORMAL
LEUKOCYTE ESTERASE UR QL STRIP: ABNORMAL
LYMPHOCYTES # BLD AUTO: 1.3 K/UL (ref 1–4.8)
LYMPHOCYTES NFR BLD: 26.5 % (ref 18–48)
MCH RBC QN AUTO: 30.4 PG (ref 27–31)
MCHC RBC AUTO-ENTMCNC: 32.6 G/DL (ref 32–36)
MCV RBC AUTO: 93 FL (ref 82–98)
MICROSCOPIC COMMENT: ABNORMAL
MONOCYTES # BLD AUTO: 0.6 K/UL (ref 0.3–1)
MONOCYTES NFR BLD: 11.1 % (ref 4–15)
NEUTROPHILS # BLD AUTO: 2.9 K/UL (ref 1.8–7.7)
NEUTROPHILS NFR BLD: 58 % (ref 38–73)
NITRITE UR QL STRIP: NEGATIVE
NRBC BLD-RTO: 0 /100 WBC
PH UR STRIP: 6 [PH] (ref 5–8)
PLATELET # BLD AUTO: 203 K/UL (ref 150–450)
PMV BLD AUTO: 10.7 FL (ref 9.2–12.9)
POTASSIUM SERPL-SCNC: 3.4 MMOL/L (ref 3.5–5.1)
PROT SERPL-MCNC: 7.9 G/DL (ref 6–8.4)
PROT UR QL STRIP: ABNORMAL
RBC # BLD AUTO: 5.95 M/UL (ref 4.6–6.2)
RBC #/AREA URNS AUTO: 46 /HPF (ref 0–4)
SODIUM SERPL-SCNC: 139 MMOL/L (ref 136–145)
SP GR UR STRIP: >1.03 (ref 1–1.03)
SQUAMOUS #/AREA URNS AUTO: 2 /HPF
URN SPEC COLLECT METH UR: ABNORMAL
WBC # BLD AUTO: 5.06 K/UL (ref 3.9–12.7)
WBC #/AREA URNS AUTO: 63 /HPF (ref 0–5)

## 2023-02-07 PROCEDURE — 87389 HIV-1 AG W/HIV-1&-2 AB AG IA: CPT | Performed by: PHYSICIAN ASSISTANT

## 2023-02-07 PROCEDURE — 93010 EKG 12-LEAD: ICD-10-PCS | Mod: ,,, | Performed by: INTERNAL MEDICINE

## 2023-02-07 PROCEDURE — 81001 URINALYSIS AUTO W/SCOPE: CPT | Performed by: EMERGENCY MEDICINE

## 2023-02-07 PROCEDURE — 87088 URINE BACTERIA CULTURE: CPT | Performed by: EMERGENCY MEDICINE

## 2023-02-07 PROCEDURE — 87086 URINE CULTURE/COLONY COUNT: CPT | Performed by: EMERGENCY MEDICINE

## 2023-02-07 PROCEDURE — 99285 PR EMERGENCY DEPT VISIT,LEVEL V: ICD-10-PCS | Mod: ,,, | Performed by: EMERGENCY MEDICINE

## 2023-02-07 PROCEDURE — 93010 ELECTROCARDIOGRAM REPORT: CPT | Mod: ,,, | Performed by: INTERNAL MEDICINE

## 2023-02-07 PROCEDURE — 80053 COMPREHEN METABOLIC PANEL: CPT | Performed by: EMERGENCY MEDICINE

## 2023-02-07 PROCEDURE — 99285 EMERGENCY DEPT VISIT HI MDM: CPT | Mod: 25

## 2023-02-07 PROCEDURE — 93005 ELECTROCARDIOGRAM TRACING: CPT

## 2023-02-07 PROCEDURE — 99285 EMERGENCY DEPT VISIT HI MDM: CPT | Mod: ,,, | Performed by: EMERGENCY MEDICINE

## 2023-02-07 PROCEDURE — 86803 HEPATITIS C AB TEST: CPT | Performed by: PHYSICIAN ASSISTANT

## 2023-02-07 PROCEDURE — 85025 COMPLETE CBC W/AUTO DIFF WBC: CPT | Performed by: EMERGENCY MEDICINE

## 2023-02-07 NOTE — PROGRESS NOTES
C3 nurse attempted to contact Gabriel Springer for a TCC post hospital discharge follow up call. No answer. Left voicemail with callback information. The patient does not have a scheduled HOSFU appointment. [facility]; unable to route message

## 2023-02-08 PROCEDURE — 99223 PR INITIAL HOSPITAL CARE,LEVL III: ICD-10-PCS | Mod: AI,,, | Performed by: PSYCHIATRY & NEUROLOGY

## 2023-02-08 PROCEDURE — 97161 PT EVAL LOW COMPLEX 20 MIN: CPT

## 2023-02-08 PROCEDURE — 97116 GAIT TRAINING THERAPY: CPT

## 2023-02-08 PROCEDURE — 25000003 PHARM REV CODE 250: Performed by: EMERGENCY MEDICINE

## 2023-02-08 PROCEDURE — G0378 HOSPITAL OBSERVATION PER HR: HCPCS

## 2023-02-08 PROCEDURE — 99223 1ST HOSP IP/OBS HIGH 75: CPT | Mod: AI,,, | Performed by: PSYCHIATRY & NEUROLOGY

## 2023-02-08 RX ORDER — CLOPIDOGREL BISULFATE 75 MG/1
75 TABLET ORAL DAILY
Status: DISCONTINUED | OUTPATIENT
Start: 2023-02-08 | End: 2023-02-09 | Stop reason: HOSPADM

## 2023-02-08 RX ORDER — TAMSULOSIN HYDROCHLORIDE 0.4 MG/1
0.4 CAPSULE ORAL DAILY
Status: DISCONTINUED | OUTPATIENT
Start: 2023-02-09 | End: 2023-02-09 | Stop reason: HOSPADM

## 2023-02-08 RX ORDER — CLOPIDOGREL BISULFATE 75 MG/1
75 TABLET ORAL DAILY
Status: DISCONTINUED | OUTPATIENT
Start: 2023-02-09 | End: 2023-02-08

## 2023-02-08 RX ORDER — NAPROXEN SODIUM 220 MG/1
81 TABLET, FILM COATED ORAL DAILY
Status: DISCONTINUED | OUTPATIENT
Start: 2023-02-08 | End: 2023-02-09 | Stop reason: HOSPADM

## 2023-02-08 RX ORDER — HEPARIN SODIUM 5000 [USP'U]/ML
5000 INJECTION, SOLUTION INTRAVENOUS; SUBCUTANEOUS EVERY 8 HOURS
Status: DISCONTINUED | OUTPATIENT
Start: 2023-02-08 | End: 2023-02-09 | Stop reason: HOSPADM

## 2023-02-08 RX ORDER — AMLODIPINE BESYLATE 10 MG/1
10 TABLET ORAL DAILY
Status: DISCONTINUED | OUTPATIENT
Start: 2023-02-08 | End: 2023-02-09 | Stop reason: HOSPADM

## 2023-02-08 RX ORDER — BUDESONIDE AND FORMOTEROL FUMARATE DIHYDRATE 160; 4.5 UG/1; UG/1
2 AEROSOL RESPIRATORY (INHALATION) 2 TIMES DAILY
Status: DISCONTINUED | OUTPATIENT
Start: 2023-02-08 | End: 2023-02-08

## 2023-02-08 RX ORDER — ALBUTEROL SULFATE 90 UG/1
2 AEROSOL, METERED RESPIRATORY (INHALATION) EVERY 6 HOURS PRN
Status: DISCONTINUED | OUTPATIENT
Start: 2023-02-08 | End: 2023-02-09 | Stop reason: HOSPADM

## 2023-02-08 RX ORDER — ATORVASTATIN CALCIUM 40 MG/1
40 TABLET, FILM COATED ORAL DAILY
Status: DISCONTINUED | OUTPATIENT
Start: 2023-02-09 | End: 2023-02-09 | Stop reason: HOSPADM

## 2023-02-08 RX ORDER — FLUTICASONE FUROATE AND VILANTEROL 100; 25 UG/1; UG/1
1 POWDER RESPIRATORY (INHALATION) DAILY
Status: DISCONTINUED | OUTPATIENT
Start: 2023-02-09 | End: 2023-02-09 | Stop reason: HOSPADM

## 2023-02-08 RX ORDER — SODIUM CHLORIDE 0.9 % (FLUSH) 0.9 %
10 SYRINGE (ML) INJECTION
Status: DISCONTINUED | OUTPATIENT
Start: 2023-02-08 | End: 2023-02-09 | Stop reason: HOSPADM

## 2023-02-08 RX ORDER — MONTELUKAST SODIUM 10 MG/1
10 TABLET ORAL DAILY
Status: DISCONTINUED | OUTPATIENT
Start: 2023-02-09 | End: 2023-02-09 | Stop reason: HOSPADM

## 2023-02-08 RX ORDER — NAPROXEN SODIUM 220 MG/1
81 TABLET, FILM COATED ORAL DAILY
Status: DISCONTINUED | OUTPATIENT
Start: 2023-02-09 | End: 2023-02-08

## 2023-02-08 RX ORDER — AMLODIPINE BESYLATE 10 MG/1
10 TABLET ORAL DAILY
Status: DISCONTINUED | OUTPATIENT
Start: 2023-02-09 | End: 2023-02-08

## 2023-02-08 RX ADMIN — ASPIRIN 81 MG: 81 TABLET, CHEWABLE ORAL at 04:02

## 2023-02-08 RX ADMIN — AMLODIPINE BESYLATE 10 MG: 10 TABLET ORAL at 04:02

## 2023-02-08 RX ADMIN — CLOPIDOGREL BISULFATE 75 MG: 75 TABLET ORAL at 04:02

## 2023-02-08 NOTE — PLAN OF CARE
Initial Discharge Planning Case Management Assessment:    Patient admitted on 2-7-23  Chart reviewed  Care plan discussed with treatment team  attending Dr Ronquillo  PCP updated in Epic: yes    DME at home: R/W,   Current dispo: pending  Rehab vs SNf vs Home Health   Careport referrals initiated for both Rehab and SNF  Rehab-  [facility]- pending  [facility]- accepted, spoke with Agnes (Agnes submitted for Auth thru the [employer] portal, await approval/review)  [facility]- Pending  [facility] - not in [employer] network  [facility]- pending  [facility]- - pending  [facility]- pending    SNF's\  [facility]- at capacity today  [facility]- [facility]  [facility]-  pending  [facility]- pending  [facility]- denied  [facility]- pending  [facility]- pending  LOcet/PAss-r 1 completed, await for 142    Transportation:has reliable  Consults following: case mgt., PT, OT  Case management  to follow    Addendum today at %;30 p  Rehab approved by [employer] at 5:30 p  Ref number- [id number]  [facility] accepted  Accepting doctor- Dr Urias  Called Kendra [phone]) , [facility], left a voice mail  Spoke with rehab charge nurse, she stated they can accept Mr Springer in the morning.        Devin Coleman - Emergency Dept  Initial Discharge Assessment       Primary Care Provider: Scott Guerra Jr, MD    Admission Diagnosis: No admission diagnoses are documented for this encounter.    Admission Date: 2/7/2023  Expected Discharge Date:     Discharge Barriers Identified: (P) Does not adhere to care plan    Payor: [employer] MANAGED MEDICARE / Plan: [employer] MEDICARE HMO / Product Type: Capitation /     Extended Emergency Contact Information  Primary Emergency Contact: Annie Springer   [place]  Mobile Phone: [phone]  Relation: Spouse  Secondary Emergency Contact: rafaela koenig  Mobile Phone: [phone]  Relation: Daughter  Preferred language: English   needed? No    Discharge Plan A: (P) Rehab  Discharge Plan B: (P) Skilled Nursing Facility      [id number] [place], [place] - [address]  [address] [place] [address] [place] [address]  Phone: [phone] Fax: [phone]      Initial Assessment (most recent)       Adult Discharge Assessment - 02/08/23 1253          Discharge Assessment    Assessment Type Discharge Planning Assessment (P)      Confirmed/corrected address, phone number and insurance Yes (P)      Confirmed Demographics Correct on Facesheet (P)      Source of Information patient;health record (P)      Does patient/caregiver understand observation status Yes (P)      Communicated DWAIN with patient/caregiver Yes (P)      People in Home significant other (P)      Do you expect to return to your current living situation? No (P)      Prior to hospitilization cognitive status: Alert/Oriented (P)      Current cognitive status: Alert/Oriented (P)      Walking or Climbing Stairs ambulation difficulty, requires equipment (P)      Equipment Currently Used at Home walker, rolling (P)      Readmission within 30 days? Yes (P)      Patient currently being followed by outpatient case management? No (P)      Do you take prescription medications? Yes (P)      Do you have prescription coverage? Yes (P)      Do you have any problems affording any of your prescribed medications? TBD (P)      Is the patient taking medications as prescribed? yes (P)      How do you get to doctors appointments? family or friend will provide;health plan transportation (P)      Are you on dialysis? No (P)      Discharge Plan A Rehab (P)      Discharge Plan B Skilled Nursing Facility (P)      DME Needed Upon Discharge  other (see comments) (P)    W/C for home ?    Discharge Plan discussed with: Patient (P)      Discharge Barriers Identified Does not adhere to care plan (P)                      Readmission Assessment (most recent)       Readmission Assessment - 02/08/23 1124          Readmission    Why were you readmitted? Alarmed about signs/symptoms     When you left the  hospital where did you go? Home with Home Health     Did patient/caregiver refused recommended DC plan? Yes     Did you try to manage your symptoms your self? Yes     Did you call anyone? No     Did you try to see or did see a doctor or nurse before you came? No     Was this a planned readmission? No

## 2023-02-08 NOTE — PLAN OF CARE
Devin Coleman - Emergency Dept  Discharge Reassessment    Primary Care Provider: Scott Guerra Jr, MD    Expected Discharge Date:     Reassessment (most recent)       Discharge Reassessment - 02/08/23 1044          Discharge Reassessment    Assessment Type Discharge Planning Assessment     Did the patient's condition or plan change since previous assessment? No     Discharge Plan discussed with: Patient     Discharge Plan A Rehab     Discharge Plan B Skilled Nursing Facility     DME Needed Upon Discharge  none     Discharge Barriers Identified None        Post-Acute Status    Post-Acute Authorization Placement     Post-Acute Placement Status Referrals Sent                   SW spoke with patient at bedside to discuss discharge plan. PT/OT recommending rehab placement, referrals sent and pending auth. DWAIN either 2/8 or 2/9.     VIVIANA Ellison, CSW (they/them/theirs)   - Case Management   [facility]  Phone: [phone]

## 2023-02-08 NOTE — PLAN OF CARE
HARPREET spoke with physician. Patient can be placed ASAP next business day. This case was expedited to case management.     HARPREET has sent out referrals to SNF and Rehabs.    Dr. Moreland has put in PMR and a Transfer order to [facility]. Patient is in need of PASSR and LOCET.     MARIANO Lopez, MSW-[employer]  Medical Social Worker/  ER Department

## 2023-02-08 NOTE — PLAN OF CARE
02/08/23 1124   Readmission   Why were you readmitted? Alarmed about signs/symptoms   When you left the hospital where did you go? Home with Home Health   Did patient/caregiver refused recommended DC plan? Yes   Did you try to manage your symptoms your self? Yes   Did you call anyone? No   Did you try to see or did see a doctor or nurse before you came? No   Was this a planned readmission? No

## 2023-02-08 NOTE — ED NOTES
Care assumed at this time. Pt is A&Ox4, observed resting in no acute distress. V/S stable, call bell within reach, bed in low locked position. Bedside cleaning done, pt ambulates to RR with standby assist

## 2023-02-08 NOTE — PROVIDER PROGRESS NOTES - EMERGENCY DEPT.
Signout Note    I received signout from the previous provider.     Chief complaint:  Altered Mental Status (Pt from home. Pt was d/c from hospital yesterday s/p stroke. Pt reports more forgetful. Per pt. Was supposed to be d/c to rehab, not home.)      Pertinent history &exam:  Gabriel Springer is a 77 y.o. male with pertinent PMH of recent embolic MCA infarct, just discharged from hospital yesterday who presented to emergency department with complaint of inability to safely care for self at his independent living facility.  He was sent back here because he needs more intensive assistance in his living situation.  Social work has assessed him, and he qualifies for skilled nursing facility.  His EKG does not demonstrate new ischemic changes, and his labs are reassuring.  He has no new neuro deficits.  He was signed out pending repeat urinalysis and CT head for transfer to skilled nursing facility in the morning.    Vitals:    02/07/23 2201   BP: (!) 162/76   Pulse: 81   Resp:    Temp:        Imaging Studies:    CT Head Without Contrast   Final Result      1. Evolving right MCA territory acute infarction without hemorrhagic transformation.   2. Remote lacunar infarction in the left caudate head.         Electronically signed by: Ignacio Sadler   Date:    02/07/2023   Time:    23:34          Medications Given:  Medications - No data to display    Pending Items/ MDM:  77 y.o. male with recent embolic MCA stroke, discharged home yesterday, who needs SNF placement.    Urinalysis is improved from prior, doubt urinary tract infection at this time.  CT of the head without new hemorrhagic conversion, no acute abnormality noted.  Will plan transfer to skilled nursing facility in the morning.    Diagnostic Impression:    1. Forgetfulness    2. History of CVA (cerebrovascular accident)         ED Disposition Condition    Discharge Stable          ED Prescriptions    None       Follow-up Information       Follow up With Specialties  Details Why Contact Info    Scott Guerra Jr., MD Hospitalist, Family Medicine Schedule an appointment as soon as possible for a visit   [address]  [address]  [place] [address]  [phone]                  Roro Ibrahim MD  Emergency Medicine

## 2023-02-08 NOTE — CARE UPDATE
Patient asking about belongings possibly left behind on prior admission. Called and spoke to Faith, NPU charge RN who stated she did not see any patient belongings, but would look for them

## 2023-02-08 NOTE — ED PROVIDER NOTES
Encounter Date: 2/7/2023       History     Chief Complaint   Patient presents with    Altered Mental Status     Pt from home. Pt was d/c from hospital yesterday s/p stroke. Pt reports more forgetful. Per pt. Was supposed to be d/c to rehab, not home.     78 yo male presenting with memory issues.    PMH:  CVA, seizure, hypertension    Context:  The patient states he was recently in the hospital and discharged yesterday after having a stroke.  He states he was told that he needed rehab, but was discharged to his independent living facility.  The coordinator at his independent living facility told him today that he is unable to stay there since he can not completely take care of himself.  He also feels forgetful since the stroke, but not confused. No falls or head injury.   Onset:  Gradual  Location:  Ongoing left palm numbness and left upper extremity weakness, unchanged  Associated Symptoms:  Denies headache, vision change  Residual neurologic symptoms are not worsened.     The history is provided by the patient and medical records. No  was used.   Review of patient's allergies indicates:   Allergen Reactions    Penicillins Hives     Past Medical History:   Diagnosis Date    Prostate atrophy      No past surgical history on file.  No family history on file.  Social History     Tobacco Use    Smoking status: Some Days     Packs/day: 1.00     Types: Cigarettes   Substance Use Topics    Alcohol use: Yes     Alcohol/week: 1.0 standard drink     Types: 1 Cans of beer per week     Comment: 1/31/23: Denies    Drug use: No     Review of Systems   Respiratory:  Negative for shortness of breath.    Cardiovascular:  Negative for chest pain.   Skin:  Negative for wound.   Neurological:  Positive for weakness and numbness. Negative for headaches.     Physical Exam     Initial Vitals [02/07/23 1621]   BP Pulse Resp Temp SpO2   (!) 142/79 83 18 97.8 °F (36.6 °C) 97 %      MAP       --         Physical  Exam    Nursing note and vitals reviewed.  Constitutional: He is not diaphoretic. No distress.   HENT:   Head: Normocephalic and atraumatic.   Eyes: Right eye exhibits no discharge. Left eye exhibits no discharge.   Neck: Neck supple. No tracheal deviation present.   Cardiovascular:  Normal rate and regular rhythm.           Pulmonary/Chest: Breath sounds normal. No respiratory distress.   Abdominal: Abdomen is soft. There is no abdominal tenderness.   Musculoskeletal:      Cervical back: Neck supple.     Neurological: He is alert and oriented to person, place, and time.   Fully oriented, no facial asymmetry, no slurred speech    Subjective decreased sensation to light touch to palm of left hand  Sensation to light touch intact to right upper extremity, bilateral lower extremities  5/5 RUE shoulder shrug, elbow flexion/extension,  strength  4/5 LUE  strength, elbow flexion/extension,  strength  LUE pronator drift   5/5 hip flexion, knee extension/flexion, dorsi/plantar flexion b/l    Skin: Skin is warm. No rash noted.   Psychiatric: He has a normal mood and affect. His behavior is normal.       ED Course   Procedures  Labs Reviewed   CBC W/ AUTO DIFFERENTIAL - Abnormal; Notable for the following components:       Result Value    Hemoglobin 18.1 (*)     Hematocrit 55.5 (*)     RDW 14.7 (*)     All other components within normal limits   COMPREHENSIVE METABOLIC PANEL - Abnormal; Notable for the following components:    Potassium 3.4 (*)     AST 43 (*)     All other components within normal limits   HIV 1 / 2 ANTIBODY    Narrative:     Release to patient->Immediate   HEPATITIS C ANTIBODY    Narrative:     Release to patient->Immediate   URINALYSIS, REFLEX TO URINE CULTURE   URINALYSIS MICROSCOPIC     EKG Readings: (Independently Interpreted)   Initial Reading: No STEMI. Previous EKG: Compared with most recent EKG Rhythm: Normal Sinus Rhythm. Heart Rate: 79. Clinical Impression: Normal Sinus Rhythm   NSTWA -  similar to most recent, no chest pain or SOB     Imaging Results    None          Medications - No data to display  Medical Decision Making:   History:   Old Medical Records: I decided to obtain old medical records.  Old Records Summarized: records from previous admission(s).       <> Summary of Records: Admitted 1/31, discharged 2/6 - embolic stroke of the right MCA, intubated in Neuro ICU   Ordered Home health with PT/OT/SLP services  - Ordered Cardiac Event monitor.   - Ordered bedside commode, wheelchair, and shower chair.   Initial Assessment:   Emergent evaluation of a 77-year-old male presenting with memory problems and concern for his living situation.  Fully oriented on arrival, no distress, left upper extremity deficits remain and are unchanged per patient.  Forgetfulness is also not new since discharge from the hospital.   Differential Diagnosis:   Including, but not limited to:    UTI  Encounter for disposition planning  Seizure  Hemorrhagic conversion     Independently Interpreted Test(s):   I have ordered and independently interpreted EKG Reading(s) - see prior notes  Clinical Tests:   Lab Tests: Ordered and Reviewed       <> Summary of Lab: No leukocytosis, normal renal function   Radiological Study: Ordered and Reviewed  Medical Tests: Reviewed and Ordered  ED Management:  I discussed the case with Social Work for assistance and disposition planning.  I discussed the case with the Vascular Neurology service to inform them of the patient's return.  He is not altered and memory concern has been ongoing since the hospital admission.   Latest PT/OT note recommending rehab.  Per social work, patient could qualify for SNF.    Signed out to oncoming attending with  and [facility] pending.  Facility transfer orders written by Vascular Neurology - agrees with plan to dispo from ED if no indication for readmission.   If no new emergent medical conditions, anticipate transfer to SNF.   Patient understands and agrees  with the plan.     ED Diagnoses:  Forgetfulness  Chronic deficit related to prior CVA      Other:   I have discussed this case with another health care provider.           ED Course as of 02/07/23 2053 Tue Feb 07, 2023 1941 WBC: 5.06  No leukocytosis  [AB]      ED Course User Index  [AB] Rocco Moreland MD                 Clinical Impression:   Final diagnoses:  [R68.89] Forgetfulness (Primary)  [Z86.73] History of CVA (cerebrovascular accident)                Rocco Moreland MD  02/08/23 1039

## 2023-02-08 NOTE — PT/OT/SLP EVAL
Physical Therapy  Evaluation and Treatment    Gabriel Springer   [id number]    Time Tracking:     PT Received On: 02/08/23   PT Start Time: 1010   PT Stop Time: 1035   PT Total Time (min): 25 min    Billable Minutes: Evaluation 15 and Gait Training 10 minutes       Recommendations:     Discharge recommendations: Inpatient Rehabilitation     Equipment recommendations: Bath Bench    Barriers to Discharge: Decreased caregiver support (his friend at senior living facility works in the evenings, home alone from 5p - 2a)    Patient Information:     Recent Surgery: * No surgery found *      Diagnosis: Recent R MCA CVA    Length of Stay: 1 day    General Precautions: Standard, aspiration, fall  Orthopedic Precautions: None  Brace: None    Assessment:     Gabriel Springer is a 77 y.o. male admitted to [facility] on 2/7/2023 for recent R MCA CVA. Gabriel Springer tolerated evaluation well today. He was sitting up in his bedside chair with no family present upon my entry to room, agreeable to evaluation. He is A/Ox3 (person, time, place; not situation) and very pleasant, denies any pain. Exhibits poor L hand coordination and fine motor control, unable to utilize L hand to grasp objects (such as cup, walker or assist with tying his shoe). He also feels more unsteady than his baseline in terms of gait. Today he walked ~60 ft in ED hallways; initial 30 ft with therapist R hand-held support (CGA at gait belt) and very close SBA for final 30 ft. When gaze is straight ahead, gait is fairly steady; however, with any head turns he does lose balance while walking requiring therapist intervention to avoid LOB. He does not have consistent caregiver support at home; I fear he is a major fall risk if returning back to his senior living facility without consistent assistance. He would benefit from IP rehab to maximize his potential to re-gain L hand function as well as improve static and dynamic balance in standing and/or ambulation. Discussed PT role, POC, goals  "and recommendations (Inpatient Rehabilitation; tub bench) with patient; verbalized understanding. Gabriel Springer would benefit from acute PT services to promote mobility during this admission and improve return to PLOF.    Problem List: weakness, decreased endurance, impaired self-care skills, impaired mobility, decreased sitting or standing balance, gait instability, decreased cognition and safety awareness, decreased coordination, and impaired L fine motor control (hand)    Rehab Prognosis: Good; patient would benefit from acute skilled PT services to address these deficits and reach maximum level of function.    Plan:     Patient to be seen 3 x/week to address the above listed problems via gait training, therapeutic activities, therapeutic exercises, neuromuscular re-education    Plan of Care Expires: 03/10/23  Plan of Care reviewed with: patient    Subjective:     Communicated with RN prior to evaluation, appropriate to see for evaluation.    Pt found sitting up in bedside chair upon PT entry to room, agreeable to evaluation.    Patient commenting: "I tried to tie my shoes yesterday and I felt so silly, I couldn't use my left hand at all."    Does this patient have any cultural, spiritual, Church conflicts given the current situation? Patient has no barriers to learning. Patient verbalizes understanding of his/her program and goals and demonstrates them correctly. No cultural, spiritual, or educational needs identified.    Past Medical History:   Diagnosis Date    Prostate atrophy     No past surgical history on file.    Living Environment:  Pt typically lives in a senior living facility, on the 8th floor with elevator access. He reports having a "female friend" named Natalie who stays with him for parts of the day; however, she does work from 5p - 2a on most days so he is home alone during those times.    PLOF:  Prior to R [facility] on 1/31, patient was independent with all activity and self-care without assistance. " "Since his R MCA, he reports being more unsteady and demonstrates decreased coordination and fine motor control at L Hand.    DME:  Patient owns or has access to the following DME: Bedside [employer]    Upon discharge, patient will have assistance from Natalie (female friend) but not consistently during day.    Objective:     Patient found with: no active lines    Pain:  Pain Rating 1: 0/10  Pain Rating Post-Intervention 1: 0/10    Cognitive Exam:  Patient is oriented to Person, Place, and Time. For situation he reports "I'm back in the hospital so I could grab the belongings I forgot last week"  Patient follows 100% of single-step commands.    Sensation:   Intact except LT diminished at L hand    Coordination:  Struggles with L hand fine motor control, increased time to attempt finger/digit isolation to touch thumb to each finger    Lower Extremity Range of Motion:  Right Lower Extremity: WFL actively  Left Lower Extremity: WFL actively    Lower Extremity Strength:  Right Lower Extremity: grossly 4-/5 via MMT  Left Lower Extremity: grossly 4-/5 via MMT    Functional Mobility:    Bed Mobility:  NT; up in chair    Transfers:  Sit to Stand: stand-by assistance from bedside chair with no AD x 1 trial(s)    Gait:  ~60 feet in ED hallways; initial 30 ft with therapist R hand-held support (CGA at gait belt) and very close SBA for final 30 ft. When gaze is straight ahead, gait is fairly steady; however, with any head turns he does lose balance while walking requiring therapist intervention to avoid LOB    Assist level:  SBA-CGA  Device: Hand-held assist x 1 (R)    Balance:  Static Sit: Supervision at EOB    Static Stand:  SBA-CGA  with no AD    Additional Therapeutic Activity/Exercises:     1. He was sitting up in his bedside chair with no family present upon my entry to room, agreeable to evaluation. He is A/Ox3 (person, time, place; not situation) and very pleasant, denies any pain.    2. Exhibits poor L hand coordination " and fine motor control, unable to utilize L hand to grasp objects (such as cup, walker or assist with tying his shoe). He also feels more unsteady than his baseline in terms of gait.    3. Today he walked ~60 ft in ED hallways; initial 30 ft with therapist R hand-held support (CGA at gait belt) and very close SBA for final 30 ft. When gaze is straight ahead, gait is fairly steady; however, with any head turns he does lose balance while walking requiring therapist intervention to avoid LOB.    4. He does not have consistent caregiver support at home; I fear he is a major fall risk if returning back to his senior living facility without consistent assistance. He would benefit from IP rehab to maximize his potential to re-gain L hand function as well as improve static and dynamic balance in standing and/or ambulation.    5. Discussed PT role, POC, goals and recommendations (Inpatient Rehabilitation; tub bench) with patient; verbalized understanding.     AM-PAC 6 CLICK MOBILITY  Turning over in bed (including adjusting bedclothes, sheets and blankets)?: 4  Sitting down on and standing up from a chair with arms (e.g., wheelchair, bedside commode, etc.): 4  Moving from lying on back to sitting on the side of the bed?: 4  Moving to and from a bed to a chair (including a wheelchair)?: 3  Need to walk in hospital room?: 3  Climbing 3-5 steps with a railing?: 2  Basic Mobility Total Score: 20    Patient was left sitting up in bedside chair with all lines intact and SW/CM present.    Clinical Decision Making for Evaluation Complexity:  1. Body System(s) Examination: 1-2  2. Clinical Presentation: Evolving  3. Evaluation Complexity: Low    GOALS:   Multidisciplinary Problems       Physical Therapy Goals          Problem: Physical Therapy    Goal Priority Disciplines Outcome Goal Variances Interventions   Physical Therapy Goal     PT, PT/OT      Description: Goals to be met by: 2/22/23     Patient will increase functional  independence with mobility by performin. Supine to sit with [place] - Not met  2. Sit to stand transfer with [place] from chair or EOB - Not met  3. Gait  x 250 feet with Supervision using No Assistive Device - Not met  4. Ascend/descend 1 flight of stairs with right Handrail with Stand-by Assistance using No Assistive Device - Not met                     Kevin Null, PT  2023

## 2023-02-08 NOTE — PLAN OF CARE
Gabriel Springer is a 77 y.o. male admitted to [facility] on 2023 for recent R MCA CVA. Gabriel Springer tolerated evaluation well today. He was sitting up in his bedside chair with no family present upon my entry to room, agreeable to evaluation. He is A/Ox3 (person, time, place; not situation) and very pleasant, denies any pain. Exhibits poor L hand coordination and fine motor control, unable to utilize L hand to grasp objects (such as cup, walker or assist with tying his shoe). He also feels more unsteady than his baseline in terms of gait. Today he walked ~60 ft in ED hallways; initial 30 ft with therapist R hand-held support (CGA at gait belt) and very close SBA for final 30 ft. When gaze is straight ahead, gait is fairly steady; however, with any head turns he does lose balance while walking requiring therapist intervention to avoid LOB. He does not have consistent caregiver support at home; I fear he is a major fall risk if returning back to his senior living facility without consistent assistance. He would benefit from IP rehab to maximize his potential to re-gain L hand function as well as improve static and dynamic balance in standing and/or ambulation. Discussed PT role, POC, goals and recommendations (Inpatient Rehabilitation; tub bench) with patient; verbalized understanding. Gabriel Springer would benefit from acute PT services to promote mobility during this admission and improve return to PLOF.    Problem: Physical Therapy  Goal: Physical Therapy Goal  Description: Goals to be met by: 23     Patient will increase functional independence with mobility by performin. Supine to sit with [place] - Not met  2. Sit to stand transfer with [place] from chair or EOB - Not met  3. Gait  x 250 feet with Supervision using No Assistive Device - Not met  4. Ascend/descend 1 flight of stairs with right Handrail with Stand-by Assistance using No Assistive Device - Not met  Outcome: Ongoing, Progressing    Kevin  Chaka, PT  2/8/2023

## 2023-02-08 NOTE — ED NOTES
LOC: The patient is awake, alert, and oriented to self, place, time, and situation. Pt is calm and cooperative. Affect is appropriate.  Speech is appropriate and clear.     APPEARANCE: Patient resting comfortably in no acute distress. Pt arrives in gown from recent discharge    SKIN: The skin is warm and dry; color consistent with ethnicity.  Patient has dry skin with moist mucus membranes.  Skin intact; no noticeable skin breakdown or bruising noted.     MUSCULOSKELETAL: Patient moving RUE/RLE/LLE without difficulty, but having some difficulty moving LUE; denies pain in the extremities or back.  Admits weakness in LUE.     RESPIRATORY: Airway is open and patent. Respirations spontaneous, even, easy, and non-labored.  Patient has a normal effort and rate.  No accessory muscle use noted. Denies cough.     CARDIAC:  Normal rate noted.  No peripheral edema noted. No complaints of chest pain.     ABDOMEN: Soft and non tender to palpation.  No distention noted. Pt denies abdominal pain; denies nausea, vomiting, diarrhea, or constipation.    NEUROLOGIC: Eyes open spontaneously.  Behavior appropriate to situation.  Follows commands; facial expression symmetrical.  Purposeful motor response noted; reports decreased sensation LUE only. Pt denies headache; denies lightheadedness or dizziness; denies visual disturbances beyond baseline, but avoidance noted towards left side of body; decreased balance; weakness to LUE.

## 2023-02-08 NOTE — PLAN OF CARE
[facility]    FACILITY TRANSFER ORDERS      Patient Name: Gabriel Springer  YOB: 1945    PCP: Scott Guerra Jr, MD   PCP Address: [address] [facility]*  PCP Phone Number: [phone]  PCP Fax: [phone]    Encounter Date: 02/07/2023    Admit to: Skilled Nursing Facility (SNF)    Vital Signs:  Routine    Diagnoses: There are no hospital problems to display for this patient.      Allergies:  Review of patient's allergies indicates:   Allergen Reactions    Penicillins Hives       Diet: regular diet and cardiac diet    Activities: Activity as tolerated    Goals of Care Treatment Preferences:  Code Status: Full Code      Nursing: Per facility protocol    Labs: Per facility protocol    CONSULTS:    Physical Therapy to evaluate and treat. , Occupational Therapy to evaluate and treat., and Speech Therapy to evaluate and treat for Language, Swallowing, and Cognition.    MISCELLANEOUS CARE:  None    WOUND CARE ORDERS  None    Medications: Review discharge medications with patient and family and provide education.      Current Discharge Medication List        CONTINUE these medications which have NOT CHANGED    Details   albuterol (PROVENTIL/VENTOLIN HFA) 90 mcg/actuation inhaler Inhale 2 puffs into the lungs every 6 (six) hours as needed (cough and shortness of breath). Rescue  Qty: 1 Inhaler, Refills: 0      albuterol sulfate 2.5 mg/0.5 mL Nebu Take 2.5 mg by nebulization every 4 (four) hours as needed. Rescue  Qty: 30 each, Refills: 0      amLODIPine (NORVASC) 10 MG tablet Take 1 tablet (10 mg total) by mouth once daily.  Qty: 90 tablet, Refills: 2    Comments: .      aspirin 81 MG Chew Take 1 tablet (81 mg total) by mouth once daily.  Qty: 90 tablet, Refills: 3      atorvastatin (LIPITOR) 40 MG tablet Take 1 tablet (40 mg total) by mouth once daily.  Qty: 90 tablet, Refills: 3      clopidogreL (PLAVIX) 75 mg tablet Take 1 tablet (75 mg total) by mouth  once daily.  Qty: 90 tablet, Refills: 3      montelukast (SINGULAIR) 10 mg tablet Take 10 mg by mouth once daily.      SYMBICORT 160-4.5 mcg/actuation HFAA Inhale 2 puffs into the lungs 2 (two) times a day.      tamsulosin (FLOMAX) 0.4 mg Cap Take 0.4 mg by mouth once daily.      UNABLE TO FIND Take 2.5 mg by mouth nightly. medication name:  sleep aid (unknown name)                Immunizations Administered as of 2/7/2023       Name Date Dose VIS Date Route Exp Date    COVID-19, MRNA, LN-S, PF (Pfizer) (Purple Cap) 3/11/2021 0.3 mL -- Intramuscular --    Site: Right deltoid     : [employer]     Lot: [id number]     External: Auto Reconciled From Outside Source     Comment: Adminis     COVID-19, MRNA, LN-S, PF (Pfizer) (Purple Cap) 2/18/2021 0.3 mL -- Intramuscular --    Site: Left deltoid     : Pf[employer]     Lot: [id number]     External: Auto Reconciled From Outside Source     Comment: Adminis             This patient has had both covid vaccinations    Some patients may experience side effects after vaccination.  These may include fever, headache, muscle or joint aches.  Most symptoms resolve with 24-48 hours and do not require urgent medical evaluation unless they persist for more than 72 hours or symptoms are concerning for an unrelated medical condition.          _________________________________  Cayla Hines PA-C  02/07/2023

## 2023-02-08 NOTE — CONSULTS
Inpatient consult to Physical Medicine Rehab  Consult performed by: Beverley Mosley NP  Consult ordered by: Rocco Moreland MD  Reason for consult: Assess rehab needs    Reviewed patient history and current admission.  Rehab team following.  Full consult to follow.    ENDER Raman, FNP-C  Physical Medicine & Rehabilitation   02/08/2023

## 2023-02-08 NOTE — ED NOTES
Gabriel Springer, a 77 y.o. male presents to the ED via EMS from group home w/ complaint of recent admit for CVA and requesting rehab- which previously refused. Pt has left arm weakness/drift with some decreased sensation and avoidance toward left side of body. Otherwise pt states he is at baseline prior to CVA dx. AAOx4. Decreased vision bilaterally at baseline- cataracts noted. Hearing intact.Unsteady gate, but can walk x1 assist.     Triage note:  Chief Complaint   Patient presents with    Altered Mental Status     Pt from home. Pt was d/c from hospital yesterday s/p stroke. Pt reports more forgetful. Per pt. Was supposed to be d/c to rehab, not home.     Review of patient's allergies indicates:   Allergen Reactions    Penicillins Hives     Past Medical History:   Diagnosis Date    Prostate atrophy

## 2023-02-08 NOTE — PROVIDER PROGRESS NOTES - EMERGENCY DEPT.
Encounter Date: 2/7/2023    ED Physician Progress Notes        Physician Note:   I received sign-out at 6:00 a.m. regarding this patient  -Patient presented to the ED for needing rehab after recent stroke    Labs Reviewed  URINALYSIS, REFLEX TO URINE CULTURE - Abnormal; Notable for the following components:     Appearance, UA                Hazy (*)               Specific Gravity, UA          >1.030 (*)               Protein, UA                   1+ (*)                 Ketones, UA                   1+ (*)                 Bilirubin (UA)                1+ (*)                 Occult Blood UA               3+ (*)                 Leukocytes, UA                2+ (*)              All other components within normal limits         Narrative: Specimen Source->Urine  CBC W/ AUTO DIFFERENTIAL - Abnormal; Notable for the following components:     Hemoglobin                    18.1 (*)               Hematocrit                    55.5 (*)               RDW                           14.7 (*)            All other components within normal limits  COMPREHENSIVE METABOLIC PANEL - Abnormal; Notable for the following components:     Potassium                     3.4 (*)                AST                           43 (*)              All other components within normal limits  URINALYSIS MICROSCOPIC - Abnormal; Notable for the following components:     RBC, UA                       46 (*)                 WBC, UA                       63 (*)                 Bacteria                      Few (*)             All other components within normal limits         Narrative: Specimen Source->Urine  CULTURE, URINE  HIV 1 / 2 ANTIBODY         Narrative: Release to patient->Immediate  HEPATITIS C ANTIBODY  CT Head Without Contrast   Final Result        1. Evolving right MCA territory acute infarction without hemorrhagic transformation.    2. Remote lacunar infarction in the left caudate head.            Electronically signed by: Ignacio Sadler     Date:    02/07/2023    Time:    23:34     Medications - No data to display    -Plan is for social work, PT, OT evaluation for rehab placement today    On clinical reassessment, well-appearing clinically, sitting on the edge of the bed, asking when he can go home  -I have discussed the case with the ED  and the patient has been approved by [facility] and they are just waiting for [employer] approval as well.  PT and OT have already met with him.  The patient is apparently agreeable to the plan when he discusses it with the

## 2023-02-08 NOTE — DISCHARGE INSTRUCTIONS
Tests today showed:   Labs Reviewed   URINALYSIS, REFLEX TO URINE CULTURE - Abnormal; Notable for the following components:       Result Value    Appearance, UA Hazy (*)     Specific Gravity, UA >1.030 (*)     Protein, UA 1+ (*)     Ketones, UA 1+ (*)     Bilirubin (UA) 1+ (*)     Occult Blood UA 3+ (*)     Leukocytes, UA 2+ (*)     All other components within normal limits    Narrative:     Specimen Source->Urine   CBC W/ AUTO DIFFERENTIAL - Abnormal; Notable for the following components:    Hemoglobin 18.1 (*)     Hematocrit 55.5 (*)     RDW 14.7 (*)     All other components within normal limits   COMPREHENSIVE METABOLIC PANEL - Abnormal; Notable for the following components:    Potassium 3.4 (*)     AST 43 (*)     All other components within normal limits   URINALYSIS MICROSCOPIC - Abnormal; Notable for the following components:    RBC, UA 46 (*)     WBC, UA 63 (*)     Bacteria Few (*)     All other components within normal limits    Narrative:     Specimen Source->Urine   CULTURE, URINE   HIV 1 / 2 ANTIBODY    Narrative:     Release to patient->Immediate   HEPATITIS C ANTIBODY    Narrative:     Release to patient->Immediate     CT Head Without Contrast   Final Result      1. Evolving right MCA territory acute infarction without hemorrhagic transformation.   2. Remote lacunar infarction in the left caudate head.         Electronically signed by: Ignacio Sadler   Date:    02/07/2023   Time:    23:34          Treatments you had today:   Medications - No data to display    Follow-Up Plan:  - Follow-up with primary care doctor within 3 - 5 days  - Additional testing and/or evaluation as directed by your primary doctor    Return to the Emergency Department for symptoms including but not limited to: worsening symptoms, shortness of breath or chest pain, vomiting with inability to hold down fluids, fevers greater than 100.4°F, passing out/fainting/unconsciousness, or other concerning symptoms.

## 2023-02-09 ENCOUNTER — TELEPHONE (OUTPATIENT)
Dept: NEUROLOGY | Facility: CLINIC | Age: 78
End: 2023-02-09
Payer: MEDICARE

## 2023-02-09 VITALS
HEIGHT: 73 IN | DIASTOLIC BLOOD PRESSURE: 79 MMHG | OXYGEN SATURATION: 93 % | WEIGHT: 125 LBS | RESPIRATION RATE: 15 BRPM | BODY MASS INDEX: 16.57 KG/M2 | HEART RATE: 70 BPM | SYSTOLIC BLOOD PRESSURE: 174 MMHG | TEMPERATURE: 99 F

## 2023-02-09 LAB — BACTERIA UR CULT: ABNORMAL

## 2023-02-09 PROCEDURE — 94640 AIRWAY INHALATION TREATMENT: CPT

## 2023-02-09 PROCEDURE — 63600175 PHARM REV CODE 636 W HCPCS: Performed by: PHYSICIAN ASSISTANT

## 2023-02-09 PROCEDURE — 25000003 PHARM REV CODE 250: Performed by: PHYSICIAN ASSISTANT

## 2023-02-09 PROCEDURE — 99222 PR INITIAL HOSPITAL CARE,LEVL II: ICD-10-PCS | Mod: ,,, | Performed by: NURSE PRACTITIONER

## 2023-02-09 PROCEDURE — G0378 HOSPITAL OBSERVATION PER HR: HCPCS

## 2023-02-09 PROCEDURE — 25000003 PHARM REV CODE 250: Performed by: EMERGENCY MEDICINE

## 2023-02-09 PROCEDURE — 99233 PR SUBSEQUENT HOSPITAL CARE,LEVL III: ICD-10-PCS | Mod: GC,,, | Performed by: PSYCHIATRY & NEUROLOGY

## 2023-02-09 PROCEDURE — 96372 THER/PROPH/DIAG INJ SC/IM: CPT | Performed by: PHYSICIAN ASSISTANT

## 2023-02-09 PROCEDURE — 99222 1ST HOSP IP/OBS MODERATE 55: CPT | Mod: ,,, | Performed by: NURSE PRACTITIONER

## 2023-02-09 PROCEDURE — 94761 N-INVAS EAR/PLS OXIMETRY MLT: CPT

## 2023-02-09 PROCEDURE — 25000242 PHARM REV CODE 250 ALT 637 W/ HCPCS: Performed by: PHYSICIAN ASSISTANT

## 2023-02-09 PROCEDURE — 99233 SBSQ HOSP IP/OBS HIGH 50: CPT | Mod: GC,,, | Performed by: PSYCHIATRY & NEUROLOGY

## 2023-02-09 RX ADMIN — CLOPIDOGREL BISULFATE 75 MG: 75 TABLET ORAL at 08:02

## 2023-02-09 RX ADMIN — TAMSULOSIN HYDROCHLORIDE 0.4 MG: 0.4 CAPSULE ORAL at 08:02

## 2023-02-09 RX ADMIN — HEPARIN SODIUM 5000 UNITS: 5000 INJECTION INTRAVENOUS; SUBCUTANEOUS at 06:02

## 2023-02-09 RX ADMIN — FLUTICASONE FUROATE AND VILANTEROL TRIFENATATE 1 PUFF: 100; 25 POWDER RESPIRATORY (INHALATION) at 07:02

## 2023-02-09 RX ADMIN — ASPIRIN 81 MG: 81 TABLET, CHEWABLE ORAL at 08:02

## 2023-02-09 RX ADMIN — MONTELUKAST 10 MG: 10 TABLET, FILM COATED ORAL at 08:02

## 2023-02-09 RX ADMIN — AMLODIPINE BESYLATE 10 MG: 10 TABLET ORAL at 08:02

## 2023-02-09 RX ADMIN — ATORVASTATIN CALCIUM 40 MG: 40 TABLET, FILM COATED ORAL at 08:02

## 2023-02-09 NOTE — CONSULTS
Inpatient consult to Physical Medicine Rehab  Consult performed by: Beverley Mosley NP  Consult ordered by: Humberto Beaver PA-C  Reason for consult: Assess rehab needs    Reviewed patient history and current admission.  Rehab team following.  Full consult to follow.    ENDER Raman, FNP-C  Physical Medicine & Rehabilitation   02/09/2023

## 2023-02-09 NOTE — ED PROVIDER NOTES
Case signed out to me at 2:00 p.m..  He has rehab placement pending.  Discussed with social work.  They are able to find a facility and were awaiting approval.  By the time insurance approval came facility was unable to take him.  Discussed case with vascular neurology.  They will observe and hopefully place tomorrow.     Jr Fish MD  02/08/23 2542

## 2023-02-09 NOTE — HOSPITAL COURSE
2/6/23: Participated w/ PT. Bed mob SV. Sit to stand NADINE. [facility] 18.

## 2023-02-09 NOTE — ASSESSMENT & PLAN NOTE
Area of cytotoxic cerebral edema identified when reviewing brain imaging. We will continue to monitor the patients clinical exam for any worsening of symptoms which may indicate expansion of the stroke or the area of the edema resulting in the clinical change. The pattern is suggestive of embolic etiology. Repeat CTH PRN for acute neuro exam changes.      Stable at this time but will continue to monitor

## 2023-02-09 NOTE — SUBJECTIVE & OBJECTIVE
Neurologic Chief Complaint: Embolic stroke involving the right MCA    Subjective:     Interval History: Patient is seen for follow-up neurological assessment and treatment recommendations: Patient presented asking to be placed in [facility], NIH same as discharge, patient on DAPT.    HPI, Past Medical, Family, and Social History remains the same as documented in the initial encounter.     Review of Systems   Constitutional:  Positive for activity change. Negative for fatigue and fever.   HENT:  Negative for trouble swallowing and voice change.    Respiratory:  Negative for cough and shortness of breath.    Cardiovascular:  Negative for chest pain and leg swelling.   Gastrointestinal:  Negative for abdominal distention and abdominal pain.   Genitourinary:  Positive for difficulty urinating. Negative for flank pain.   Musculoskeletal:  Positive for gait problem. Negative for back pain.   Skin:  Negative for color change and rash.   Neurological:  Positive for weakness. Negative for speech difficulty and numbness.   Psychiatric/Behavioral:  Negative for agitation and confusion.    Scheduled Meds:   amLODIPine  10 mg Oral Daily    aspirin  81 mg Oral Daily    atorvastatin  40 mg Oral Daily    clopidogreL  75 mg Oral Daily    fluticasone furoate-vilanteroL  1 puff Inhalation Daily    heparin (porcine)  5,000 Units Subcutaneous Q8H    montelukast  10 mg Oral Daily    tamsulosin  0.4 mg Oral Daily     Continuous Infusions:   sodium chloride 0.9%       PRN Meds:albuterol, sodium chloride 0.9%, sodium chloride 0.9%    Objective:     Vital Signs (Most Recent):  Temp: 98.6 °F (37 °C) (02/09/23 0749)  Pulse: 70 (02/09/23 0823)  Resp: 15 (02/09/23 0749)  BP: (!) 174/79 (02/09/23 0749)  SpO2: (!) 93 % (02/09/23 0749)  BP Location: Right arm    Vital Signs Range (Last 24H):  Temp:  [97.2 °F (36.2 °C)-98.8 °F (37.1 °C)]   Pulse:  [68-97]   Resp:  [15-18]   BP: (147-174)/(65-81)   SpO2:  [93 %-97 %]   BP Location: Right arm    Physical  Exam  Vitals and nursing note reviewed.   Constitutional:       Appearance: He is not diaphoretic.   HENT:      Head: Normocephalic and atraumatic.      Right Ear: External ear normal.      Left Ear: External ear normal.      Nose: No rhinorrhea.   Eyes:      General: No scleral icterus.     Comments: R gaze preference    Cardiovascular:      Rate and Rhythm: Normal rate.   Pulmonary:      Effort: Pulmonary effort is normal. No respiratory distress.   Abdominal:      General: There is no distension.   Musculoskeletal:         General: No tenderness. Normal range of motion.      Cervical back: Normal range of motion.   Skin:     General: Skin is warm and dry.   Neurological:      Mental Status: He is alert and oriented to person, place, and time.      Cranial Nerves: Dysarthria present. No facial asymmetry.      Motor: Weakness present.   Psychiatric:         Behavior: Behavior is cooperative.       Neurological Exam:   LOC: alert  Attention Span: Good   Language: No aphasia  Articulation: Dysarthria  Orientation: Person, Place, Time   EOM (CN III, IV, VI): Gaze preference  right  Facial Movement (CN VII): Symmetric facial expression    Motor: Arm left  Paresis: 4/5  Leg left  Paresis: 4/5  Arm right  Normal 5/5  Leg right Normal 5/5  Sensation: Intact to light touch         Laboratory:  CMP:       Recent Labs   Lab 02/07/23  1859   CALCIUM 10.1   ALBUMIN 3.8   PROT 7.9      K 3.4*   CO2 26   CL 99   BUN 13   CREATININE 1.0   ALKPHOS 100   ALT 29   AST 43*   BILITOT 0.7      CBC:       Recent Labs   Lab 02/07/23  1859   WBC 5.06   RBC 5.95   HGB 18.1*   HCT 55.5*      MCV 93   MCH 30.4   MCHC 32.6      Lipid Panel: No results for input(s): CHOL, LDLCALC, HDL, TRIG in the last 168 hours.  Coagulation: No results for input(s): PT, INR, APTT in the last 168 hours.  Hgb A1C: No results for input(s): HGBA1C in the last 168 hours.  TSH: No results for input(s): TSH in the last 168 hours.      Diagnostic  Results:     Brain Imaging   CTH 2/7/23   1. Evolving right MCA territory acute infarction without hemorrhagic transformation.  2. Remote lacunar infarction in the left caudate head.     MRI Brain WO Contrast 1/31/23   Extensive foci of diffusion restriction in the right MCA distribution as above, consistent acute right MCA territory infarction.  No evidence of hemorrhagic conversion. Changes of chronic small vessel ischemic disease and cerebral volume loss.     CTH 1/31/23   1. Ill-defined areas of parenchymal hypoattenuation within the right temporoparietal region most concerning for age-indeterminate infarcts.   2. Suspected remote infarcts of the posterior right occipital lobe and bilateral basal ganglia.  3. Chronic microvascular ischemic change.     Vessel Imaging   CTA H/N 1/31/23   Acute right MCA distribution infarct.  Concern for small thrombus with stenosis and or diminished flow involving the inferior branch of the M2 segment of the right MCA. Remote left caudate head lacunar infarct. Bullous emphysematous changes.     US Carotid BL 1/31/23   Abnormal examination with elevated peak systolic velocity within the right internal carotid artery.  There is also a high resistance waveform within the right common and internal carotid arteries.  Note that the patient was moving during the examination.      Cardiac Imaging   TTE 2/1/23   The left ventricle is normal in size with concentric remodeling and normal systolic function.  The estimated ejection fraction is 68%.  Normal left ventricular diastolic function.  Normal right ventricular size with normal right ventricular systolic function.  Mechanically ventilated; cannot use inferior caval vein diameter to estimate central venous pressure.     TTE 1/31/23   There is no evidence of intracardiac shunting.  The left ventricle is normal in size with concentric remodeling and normal systolic function.  The estimated ejection fraction is 65%.  Normal left  ventricular diastolic function.  Normal right ventricular size with normal right ventricular systolic function.  Normal central venous pressure (3 mmHg).  The estimated PA systolic pressure is 7 mmHg.  There is no pulmonary hypertension.

## 2023-02-09 NOTE — ASSESSMENT & PLAN NOTE
Stroke risk factor  Patient hypertensive as high as 245/121 prior admission.  He is not noted to be on any BP meds at home previously   SBP goal at this time < 180  On amlodipine 10mg at time of discharge, okay to continue

## 2023-02-09 NOTE — NURSING
Report called to nino TERAN at [facility], all questions answered.  Pt assisted to wheelchair transport, belongings bag with pt clothing provided to pt.  Pt denies further belongings, ie phone or wallet, ect.

## 2023-02-09 NOTE — ASSESSMENT & PLAN NOTE
Mr. Springer is a 78yo M with PMH of smoking, asthma, and BPH. He initially presented as a transfer from the [facility] for R MCA infarct and encephalopathy. Patient went to ED at  for LUE weakness and numbness. He reported having difficulty walking at home. Patient required intubation due to hypoxia at OSH prior to arrival. On arrival to [facility] NIHSS 26. CTA with small thrombus with stenosis/diminshed flow through R M2. No intervention. Patient admitted to [facility]. MRI with R temporal, parietal, posterior frontal, and subinsular cortex infarcts. TTE unremarkable. He was admitted to [facility] and later extubated. Etiology at time of discharge ESUS. 30d cardiac event monitor at discharge. Patient on DAPT at time of discharge Therapy recommended IPR. Patient declined IPR 2/2 agitation but was agreeable to home health with family help.       Patient returned to ED today as he was unable to take care of himself and was not able to receive adequate care at home for his deficits. Stroke team was consulted and therapy has re-evaluated the patient. Recommendations remain for IPR. On re-examination of patient with similar NIHSS to the time of prior discharge. No new or worsening deficits. Will be re-admitted to observation with vascular neurology. Plan is to discharge patient home with IPR (case management has been working with [facility] for possible discharge tomorrow). Continue home meds.       Antithrombotics for secondary stroke prevention: Antiplatelets: Aspirin: 81 mg daily  Clopidogrel: 75 mg daily    Statins for secondary stroke prevention and hyperlipidemia, if present:   Statins: Atorvastatin- 40 mg daily    Aggressive risk factor modification: HTN, Smoking, HLD     Rehab efforts: The patient has been evaluated by a stroke team provider and the therapy needs have been fully considered based off the presenting complaints and exam findings. The following therapy evaluations are needed: PT evaluate and treat, OT  evaluate and treat, SLP evaluate and treat, PM&R evaluate for appropriate placement    Diagnostics ordered/pending: None     VTE prophylaxis: Heparin 5000 units SQ every 8 hours  Mechanical prophylaxis: Place SCDs    BP parameters: Infarct: SBP < 180

## 2023-02-09 NOTE — HPI
Gabriel Springer is a 77-year-old male with PMHx of smoking, asthma, and BPH. Patient transferred to [facility] from [facility] on 2/7/23 for R MCA infarct and encephalopathy. MRI with R temporal, parietal, posterior frontal, and subinsular cortex infarcts. TTE unremarkable. Hospital course complicated by urinary retention (UA negative), Seizure (on Keppra BID, EEG negative), HTN (Was on Cardene now improved).     Functional History: Patient lives in senior living center with a friend.  Prior to admission, I. DME: none.

## 2023-02-09 NOTE — PLAN OF CARE
SSC met with patient/family at bedside. Patient experience rounding completed and reviewed the following.     Do you know your discharge plan? Yes    If yes, what is the plan? Home    If you are discharging home, do you have help at home? Yes     Do you think you will need help at home at discharge? No     Have you discussed your needs and preferences with your SW/CM? Yes      Assigned SW/CM notified of any patient/family needs or concerns.

## 2023-02-09 NOTE — ASSESSMENT & PLAN NOTE
Mr. Springer is a 76yo M with PMH of smoking, asthma, and BPH. He initially presented as a transfer from the [facility] for R MCA infarct and encephalopathy. Patient went to ED at  for LUE weakness and numbness. He reported having difficulty walking at home. Patient required intubation due to hypoxia at OSH prior to arrival. On arrival to [facility] NIHSS 26. CTA with small thrombus with stenosis/diminshed flow through R M2. No intervention. Patient admitted to [facility]. MRI with R temporal, parietal, posterior frontal, and subinsular cortex infarcts. TTE unremarkable. He was admitted to [facility] and later extubated. Etiology at time of discharge ESUS. 30d cardiac event monitor at discharge. Patient on DAPT at time of discharge Therapy recommended IPR. Patient declined IPR 2/2 agitation but was agreeable to home health with family help.       Patient returned to ED today as he was unable to take care of himself and was not able to receive adequate care at home for his deficits. Stroke team was consulted and therapy has re-evaluated the patient. Recommendations remain for IPR. On re-examination of patient with similar NIHSS to the time of prior discharge. No new or worsening deficits. Will be re-admitted to observation with vascular neurology. Plan is to discharge patient home with IPR (case management has been working with [facility] for possible discharge tomorrow). Continue home meds.       Antithrombotics for secondary stroke prevention: Antiplatelets: Aspirin: 81 mg daily  Clopidogrel: 75 mg daily    Statins for secondary stroke prevention and hyperlipidemia, if present:   Statins: Atorvastatin- 40 mg daily    Aggressive risk factor modification: HTN, Smoking, HLD     Rehab efforts: The patient has been evaluated by a stroke team provider and the therapy needs have been fully considered based off the presenting complaints and exam findings. The following therapy evaluations are needed: PT evaluate and treat, OT  evaluate and treat, SLP evaluate and treat, PM&R evaluate for appropriate placement    Diagnostics ordered/pending: None     VTE prophylaxis: Heparin 5000 units SQ every 8 hours  Mechanical prophylaxis: Place SCDs    BP parameters: Infarct: SBP < 180

## 2023-02-09 NOTE — DISCHARGE SUMMARY
Devin Coleman - Neurosurgery ([facility])  Vascular Neurology  Comprehensive Stroke Center  Discharge Summary     Summary:     Admit Date: 2/7/2023  6:11 PM    Discharge Date and Time:  02/09/2023 11:22 AM    Attending Physician: No att. providers found     Discharge Provider: Lalo Colin MD    History of Present Illness: Mr. Springer is a 76yo M with PMH of smoking, asthma, and BPH. [place] [facility] [place] and [place]. Patient went to ED at  for LUE weakness and numbness. He reported having difficulty walking at home. Patient required intubation due to hypoxia at OSH prior to arrival. On arrival to [facility] NIHSS 26. CTA with small thrombus with stenosis/diminshed flow through R M2. No intervention. Patient admitted to [facility]. MRI with R temporal, parietal, posterior frontal, and subinsular cortex infarcts. TTE unremarkable. He was admitted to [facility] and later extubated. Etiology at time of discharge ESUS. 30d cardiac event monitor at discharge. Patient on DAPT at time of discharge Therapy recommended IPR. Patient declined IPR 2/2 agitation but was agreeable to home health with family help.      Patient returned to ED today as he was unable to take care of himself and was not able to receive adequate care at home for his deficits. Stroke team was consulted and therapy has re-evaluated the patient. Recommendations remain for IPR. On re-examination of patient with similar NIHSS to the time of prior discharge. No new or worsening deficits.       Hospital Course (synopsis of major diagnoses, care, treatment, and services provided during the course of the hospital stay): Hospital Course (synopsis of major diagnoses, care, treatment, and services provided during the course of the hospital stay): Patient admitted to Neuro ICU on 1/31/23 for embolic stroke of the right MCA, etiology of undetermined source. On arrival NIHSS 26, following intubation, MRI with R temporal/parietal  lobe infarcts and R subinsular cortex and posterior frontal lobe, CTA with small thrombus with stenosis /diminshed flow through R M2. EEG was negative with normal EF and no WMA on echo. He was extubated on 2/1/23 and stepped down to NPU for further monitoring as well as PT/OT/SLP. He was started on DAPT, HIS with BP control. He tolerated PO well with active bowel movements. Progressive neurological improvement (NIH 4) with PT/OT recommending IPR however the patient declined and said he would not go. While admitted patient had urinary retention, placed was ramirez and started silodosin, however patient self removed ramirez on 2/5. Patient passed self voiding trial with adequate UOP before discharge. He will need to follow up outpatient with vascular neurology for further evaluation of carotid disease and possible interventions. He will also need a cardiac event monitor for further investigation of a possible source. On 2/8/2023, Patient re-admitted after reporting to the ED that he could not care for himself and now wants to be placed in IPR.      2/2/2023 Extubated yesterday (2/1).  Tolerating well.  Off Precedex since 0900 today, redirectable.  Therapy recommending IPR.  Awaiting stepdown to NPU.  02/03/2023 Patient with urinary retention overnight, silodosin initiated. Patient required Haldol injections overnight. Seroquel scheduled. Patient is only on ASA at this time. Recommend DAPT. Etiology ESUS at this time. Will need cardiac event monitor at discharge. Therapy with recs for IPR and minced & moist diet with thin liquids.   2/4/2023: KRYSTIAN, neuro stable, patient agitated with nursing staff and doesn't like to be bothered, continued on DAPT, BP fluctuating, will start Norvasc 5 mg. Tolerating PO. Will need placement for IPR.    2/5/2023: KRYSTIAN, neuro stable- NIH 7, tolerating PO with active bowel movements. Pt has been declining straight cath, will need to place ramirez if he continues. Increasing Norvasc to 10 mg  daily. Pending inpatient rehab placement.   2/6/2023: Neuro stable- NIH 5, patient pulled out ramirez overnight, BP controlled, patient declining IPR and trying to leave. Will order home health today. Patient was able to void on his own in the restroom with good UOP.  2/8/2023: Pt presented to the ED with complaints that he was unable to take care of himself and would like placement in inpatient rehab, of which he previously declined. NIH stable, continued home medications.       Gabriel Springer was hemodynamically stable, afebrile, with improved left sided weakness on discharge.      Brief Discharge Plan:  - Scheduled follow-up appointment with Scott Guerra Jr, MD, patient's primary care provider  - Hospital discharge follow up was scheduled with vascular neurology in 4 weeks for carotid disease.   - Patient transferred to Inpatient rehab  - Ordered Cardiac Event monitor.   - Education was provided to the patient and family regarding patient's disease and treatment options  - A comprehensive and reconciled medication list was provided on discharge to the patient   - Resumed all home medications   - Started Asa 81 mg, Plavix 75 mg, Lipitor 40 mg, Norvasc 10 mg. Please continue taking your  medications as prescribed.   - No pending labs or imaging on discharge        Pt Gabriel Springer was admitted to the medicine service on 2/8/2023 and discharged on 2/9/2023 for the treatment of: placement of inpatient rehab 2/2 Embolic stroke of the right CVA.     Advised at discharge to:     Please follow up with your Primary Care Physician if symptoms persist and for medication management as needed.     Please return to the Emergency Department if you begin to experience worsening symptoms of weakness, altered mental status, slurred speech and if you begin to experience fever, chills, nausea or vomiting.     Please continue home medications as prescribed.      Goals of Care Treatment Preferences:  Code Status: Full Code      Stroke  Etiology: Ischemic Undetermined Cause Cryptogenic Embolism / ESUS (Embolic Stroke of Undetermined Source)    STROKE DOCUMENTATION         NIH Scale:  1a. Level of Consciousness: 0-->Alert, keenly responsive  1b. LOC Questions: 0-->Answers both questions correctly  1c. LOC Commands: 0-->Performs both tasks correctly  2. Best Gaze: 1-->Partial gaze palsy, gaze is abnormal in one or both eyes, but forced deviation or total gaze paresis is not present  3. Visual: 0-->No visual loss  4. Facial Palsy: 0-->Normal symmetrical movements  5a. Motor Arm, Left: 1-->Drift, limb holds 90 (or 45) degrees, but drifts down before full 10 seconds, does not hit bed or other support  5b. Motor Arm, Right: 0-->No drift, limb holds 90 (or 45) degrees for full 10 secs  6a. Motor Leg, Left: 1-->Drift, leg falls by the end of the 5-sec period but does not hit bed  6b. Motor Leg, Right: 0-->No drift, leg holds 30 degree position for full 5 secs  7. Limb Ataxia: 0-->Absent  8. Sensory: 0-->Normal, no sensory loss  9. Best Language: 0-->No aphasia, normal  10. Dysarthria: 1-->Mild-to-moderate dysarthria, patient slurs at least some words and, at worst, can be understood with some difficulty  11. Extinction and Inattention (formerly Neglect): 0-->No abnormality  Total (NIH Stroke Scale): 4        Modified Glacier Score: 3  Daniel Coma Scale:    ABCD2 Score:    [id number] Score:   HAS -BLED Score:   ICH Score:   Hunt & Sierra Classification:       Assessment/Plan:     Diagnostic Results:    Brain Imaging   MRI Brain WO Contrast 1/31/23   Extensive foci of diffusion restriction in the right MCA distribution as above, consistent acute right MCA territory infarction.  No evidence of hemorrhagic conversion. Changes of chronic small vessel ischemic disease and cerebral volume loss.     CTH 1/31/23   1. Ill-defined areas of parenchymal hypoattenuation within the right temporoparietal region most concerning for age-indeterminate infarcts.   2.  Suspected remote infarcts of the posterior right occipital lobe and bilateral basal ganglia.  3. Chronic microvascular ischemic change.     Vessel Imaging   CTA H/N 1/31/23   Acute right MCA distribution infarct.  Concern for small thrombus with stenosis and or diminished flow involving the inferior branch of the M2 segment of the right MCA. Remote left caudate head lacunar infarct. Bullous emphysematous changes.     US Carotid BL 1/31/23   Abnormal examination with elevated peak systolic velocity within the right internal carotid artery.  There is also a high resistance waveform within the right common and internal carotid arteries.  Note that the patient was moving during the examination.      Cardiac Imaging   TTE 2/1/23    The left ventricle is normal in size with concentric remodeling and normal systolic function.   The estimated ejection fraction is 68%.   Normal left ventricular diastolic function.   Normal right ventricular size with normal right ventricular systolic function.   Mechanically ventilated; cannot use inferior caval vein diameter to estimate central venous pressure.     TTE 1/31/23    There is no evidence of intracardiac shunting.   The left ventricle is normal in size with concentric remodeling and normal systolic function.   The estimated ejection fraction is 65%.   Normal left ventricular diastolic function.   Normal right ventricular size with normal right ventricular systolic function.   Normal central venous pressure (3 mmHg).   The estimated PA systolic pressure is 7 mmHg.   There is no pulmonary hypertension.       Interventions: None    Complications: None    Disposition: Rehab Facility    Final Active Diagnoses:    Diagnosis Date Noted POA    PRINCIPAL PROBLEM:  Embolic stroke involving right middle cerebral artery [I63.411] 01/31/2023 Unknown    Tobacco abuse [Z72.0] 01/31/2023 Yes    Cytotoxic brain edema [G93.6] 01/31/2023 Yes    Impaired activities of daily living  [Z78.9] 01/31/2023 Yes    Essential hypertension [I10] 01/31/2023 Yes      Problems Resolved During this Admission:     No new Assessment & Plan notes have been filed under this hospital service since the last note was generated.  Service: Vascular Neurology      Recommendations:     Post-discharge complication risks: Falls    Stroke Education given to: patient    Follow-up in Stroke Clinic in 4-6 weeks.     Discharge Plan:  Antithrombotics: Aspirin 81mg, Clopidogrel 75mg  Statin: Atorvastatin 40mg  Smoking Cessation  Aggresive risk factor modification:  Hypertension  Smoking  High Cholesterol    Follow Up:   Follow-up Information     Scott Guerra Jr, MD. Go on 2/13/2023.    Specialties: Hospitalist, Family Medicine  Why: Hospital follow up 2/13 at 914s  Contact information:  [address]  [address] [facility] [address] [phone]                         Patient Instructions:   No discharge procedures on file.    Medications:  Reconciled Home Medications:      Medication List      CONTINUE taking these medications    * albuterol 90 mcg/actuation inhaler  Commonly known as: PROVENTIL/VENTOLIN HFA  Inhale 2 puffs into the lungs every 6 (six) hours as needed (cough and shortness of breath). Rescue     * albuterol sulfate 2.5 mg/0.5 mL Nebu  Take 2.5 mg by nebulization every 4 (four) hours as needed. Rescue     amLODIPine 10 MG tablet  Commonly known as: NORVASC  Take 1 tablet (10 mg total) by mouth once daily.     aspirin 81 MG Chew  Take 1 tablet (81 mg total) by mouth once daily.     atorvastatin 40 MG tablet  Commonly known as: LIPITOR  Take 1 tablet (40 mg total) by mouth once daily.     clopidogreL 75 mg tablet  Commonly known as: PLAVIX  Take 1 tablet (75 mg total) by mouth once daily.     montelukast 10 mg tablet  Commonly known as: SINGULAIR  Take 10 mg by mouth once daily.     SYMBICORT 160-4.5 mcg/actuation Hfaa  Generic drug: budesonide-formoterol 160-4.5  mcg  Inhale 2 puffs into the lungs 2 (two) times a day.     tamsulosin 0.4 mg Cap  Commonly known as: FLOMAX  Take 0.4 mg by mouth once daily.     UNABLE TO FIND  Take 2.5 mg by mouth nightly. medication name:  sleep aid (unknown name)         * This list has 2 medication(s) that are the same as other medications prescribed for you. Read the directions carefully, and ask your doctor or other care provider to review them with you.                Lalo Colin MD  [facility]  Department of Vascular Neurology   [facility])

## 2023-02-09 NOTE — PLAN OF CARE
Problem: Adjustment to Illness (Stroke, Ischemic/Transient Ischemic Attack)  Goal: Optimal Coping  Outcome: Ongoing, Progressing     Problem: Bowel Elimination Impaired (Stroke, Ischemic/Transient Ischemic Attack)  Goal: Effective Bowel Elimination  Outcome: Ongoing, Progressing     Problem: Cerebral Tissue Perfusion (Stroke, Ischemic/Transient Ischemic Attack)  Goal: Optimal Cerebral Tissue Perfusion  Outcome: Ongoing, Progressing     Problem: Cognitive Impairment (Stroke, Ischemic/Transient Ischemic Attack)  Goal: Optimal Cognitive Function  Outcome: Ongoing, Progressing     Problem: Communication Impairment (Stroke, Ischemic/Transient Ischemic Attack)  Goal: Improved Communication Skills  Outcome: Ongoing, Progressing     Problem: Functional Ability Impaired (Stroke, Ischemic/Transient Ischemic Attack)  Goal: Optimal Functional Ability  Outcome: Ongoing, Progressing     Problem: Respiratory Compromise (Stroke, Ischemic/Transient Ischemic Attack)  Goal: Effective Oxygenation and Ventilation  Outcome: Ongoing, Progressing     Problem: Sensorimotor Impairment (Stroke, Ischemic/Transient Ischemic Attack)  Goal: Improved Sensorimotor Function  Outcome: Ongoing, Progressing     Problem: Swallowing Impairment (Stroke, Ischemic/Transient Ischemic Attack)  Goal: Optimal Eating and Swallowing without Aspiration  Outcome: Ongoing, Progressing     Problem: Urinary Elimination Impaired (Stroke, Ischemic/Transient Ischemic Attack)  Goal: Effective Urinary Elimination  Outcome: Ongoing, Progressing     Problem: Fall Injury Risk  Goal: Absence of Fall and Fall-Related Injury  Outcome: Ongoing, Progressing     Problem: Adult Inpatient Plan of Care  Goal: Plan of Care Review  Outcome: Ongoing, Progressing  Goal: Patient-Specific Goal (Individualized)  Outcome: Ongoing, Progressing  Goal: Absence of Hospital-Acquired Illness or Injury  Outcome: Ongoing, Progressing  Goal: Optimal Comfort and Wellbeing  Outcome: Ongoing,  Progressing  Goal: Readiness for Transition of Care  Outcome: Ongoing, Progressing     Problem: Infection  Goal: Absence of Infection Signs and Symptoms  Outcome: Ongoing, Progressing     POC reviewed with pt. Pt. Requiring frequent reminders and redirection, otherwise verbalizes understanding. No neuro changes, left upper extremity continues to be slightly weaker than right side. No acute events this shift. Planning for rehab today. Safety interventions in place

## 2023-02-09 NOTE — PROGRESS NOTES
Devin Coleman - Neurosurgery ([facility])  Vascular Neurology  Comprehensive Stroke Center  Progress Note    Assessment/Plan:     * Embolic stroke involving right middle cerebral artery  Mr. Springer is a 78yo M with PMH of smoking, asthma, and BPH. He initially presented as a transfer from the [facility] for R MCA infarct and encephalopathy. Patient went to ED at  for LUE weakness and numbness. He reported having difficulty walking at home. Patient required intubation due to hypoxia at OSH prior to arrival. On arrival to [facility] NIHSS 26. CTA with small thrombus with stenosis/diminshed flow through R M2. No intervention. Patient admitted to [facility]. MRI with R temporal, parietal, posterior frontal, and subinsular cortex infarcts. TTE unremarkable. He was admitted to [facility] and later extubated. Etiology at time of discharge ESUS. 30d cardiac event monitor at discharge. Patient on DAPT at time of discharge Therapy recommended IPR. Patient declined IPR 2/2 agitation but was agreeable to home health with family help.       Patient returned to ED today as he was unable to take care of himself and was not able to receive adequate care at home for his deficits. Stroke team was consulted and therapy has re-evaluated the patient. Recommendations remain for IPR. On re-examination of patient with similar NIHSS to the time of prior discharge. No new or worsening deficits. Will be re-admitted to observation with vascular neurology. Plan is to discharge patient home with IPR (case management has been working with [facility] for possible discharge tomorrow). Continue home meds.       Antithrombotics for secondary stroke prevention: Antiplatelets: Aspirin: 81 mg daily  Clopidogrel: 75 mg daily    Statins for secondary stroke prevention and hyperlipidemia, if present:   Statins: Atorvastatin- 40 mg daily    Aggressive risk factor modification: HTN, Smoking, HLD     Rehab efforts: The patient has been evaluated by a stroke team provider  and the therapy needs have been fully considered based off the presenting complaints and exam findings. The following therapy evaluations are needed: PT evaluate and treat, OT evaluate and treat, SLP evaluate and treat, PM&R evaluate for appropriate placement    Diagnostics ordered/pending: None     VTE prophylaxis: Heparin 5000 units SQ every 8 hours  Mechanical prophylaxis: Place SCDs    BP parameters: Infarct: SBP < 180         Essential hypertension  Stroke risk factor  Patient hypertensive as high as 245/121 prior admission.  He is not noted to be on any BP meds at home previously   SBP goal at this time < 180  On amlodipine 10mg at time of discharge, okay to continue       Impaired activities of daily living  Would benefit from IPR placement   PT/OT re-evaluated patient , recommending IPR     Cytotoxic brain edema  Area of cytotoxic cerebral edema identified when reviewing brain imaging. We will continue to monitor the patients clinical exam for any worsening of symptoms which may indicate expansion of the stroke or the area of the edema resulting in the clinical change. The pattern is suggestive of embolic etiology. Repeat CTH PRN for acute neuro exam changes.      Stable at this time but will continue to monitor     Tobacco abuse  Stroke risk factor  - on smoking cessation once appropriate           No notes on file    STROKE DOCUMENTATION        NIH Scale:  1a. Level of Consciousness: 0-->Alert, keenly responsive  1b. LOC Questions: 0-->Answers both questions correctly  1c. LOC Commands: 0-->Performs both tasks correctly  2. Best Gaze: 1-->Partial gaze palsy, gaze is abnormal in one or both eyes, but forced deviation or total gaze paresis is not present  3. Visual: 0-->No visual loss  4. Facial Palsy: 0-->Normal symmetrical movements  5a. Motor Arm, Left: 1-->Drift, limb holds 90 (or 45) degrees, but drifts down before full 10 seconds, does not hit bed or other support  5b. Motor Arm, Right: 0-->No  drift, limb holds 90 (or 45) degrees for full 10 secs  6a. Motor Leg, Left: 1-->Drift, leg falls by the end of the 5-sec period but does not hit bed  6b. Motor Leg, Right: 0-->No drift, leg holds 30 degree position for full 5 secs  7. Limb Ataxia: 0-->Absent  8. Sensory: 0-->Normal, no sensory loss  9. Best Language: 0-->No aphasia, normal  10. Dysarthria: 1-->Mild-to-moderate dysarthria, patient slurs at least some words and, at worst, can be understood with some difficulty  11. Extinction and Inattention (formerly Neglect): 0-->No abnormality  Total (NIH Stroke Scale): 4       Modified Sidney Score: 3  Daniel Coma Scale:    ABCD2 Score:    [id number] Score:   HAS -BLED Score:   ICH Score:   Hunt & Sierra Classification:      Hemorrhagic change of an Ischemic Stroke: Does this patient have an ischemic stroke with hemorrhagic changes? No     Neurologic Chief Complaint: Embolic stroke involving the right MCA    Subjective:     Interval History: Patient is seen for follow-up neurological assessment and treatment recommendations: Patient presented asking to be placed in [facility], NIH same as discharge, patient on DAPT.    HPI, Past Medical, Family, and Social History remains the same as documented in the initial encounter.     Review of Systems   Constitutional:  Positive for activity change. Negative for fatigue and fever.   HENT:  Negative for trouble swallowing and voice change.    Respiratory:  Negative for cough and shortness of breath.    Cardiovascular:  Negative for chest pain and leg swelling.   Gastrointestinal:  Negative for abdominal distention and abdominal pain.   Genitourinary:  Positive for difficulty urinating. Negative for flank pain.   Musculoskeletal:  Positive for gait problem. Negative for back pain.   Skin:  Negative for color change and rash.   Neurological:  Positive for weakness. Negative for speech difficulty and numbness.   Psychiatric/Behavioral:  Negative for agitation and confusion.     Scheduled Meds:   amLODIPine  10 mg Oral Daily    aspirin  81 mg Oral Daily    atorvastatin  40 mg Oral Daily    clopidogreL  75 mg Oral Daily    fluticasone furoate-vilanteroL  1 puff Inhalation Daily    heparin (porcine)  5,000 Units Subcutaneous Q8H    montelukast  10 mg Oral Daily    tamsulosin  0.4 mg Oral Daily     Continuous Infusions:   sodium chloride 0.9%       PRN Meds:albuterol, sodium chloride 0.9%, sodium chloride 0.9%    Objective:     Vital Signs (Most Recent):  Temp: 98.6 °F (37 °C) (02/09/23 0749)  Pulse: 70 (02/09/23 0823)  Resp: 15 (02/09/23 0749)  BP: (!) 174/79 (02/09/23 0749)  SpO2: (!) 93 % (02/09/23 0749)  BP Location: Right arm    Vital Signs Range (Last 24H):  Temp:  [97.2 °F (36.2 °C)-98.8 °F (37.1 °C)]   Pulse:  [68-97]   Resp:  [15-18]   BP: (147-174)/(65-81)   SpO2:  [93 %-97 %]   BP Location: Right arm    Physical Exam  Vitals and nursing note reviewed.   Constitutional:       Appearance: He is not diaphoretic.   HENT:      Head: Normocephalic and atraumatic.      Right Ear: External ear normal.      Left Ear: External ear normal.      Nose: No rhinorrhea.   Eyes:      General: No scleral icterus.     Comments: R gaze preference    Cardiovascular:      Rate and Rhythm: Normal rate.   Pulmonary:      Effort: Pulmonary effort is normal. No respiratory distress.   Abdominal:      General: There is no distension.   Musculoskeletal:         General: No tenderness. Normal range of motion.      Cervical back: Normal range of motion.   Skin:     General: Skin is warm and dry.   Neurological:      Mental Status: He is alert and oriented to person, place, and time.      Cranial Nerves: Dysarthria present. No facial asymmetry.      Motor: Weakness present.   Psychiatric:         Behavior: Behavior is cooperative.       Neurological Exam:   LOC: alert  Attention Span: Good   Language: No aphasia  Articulation: Dysarthria  Orientation: Person, Place, Time   EOM (CN III, IV, VI): Gaze  preference  right  Facial Movement (CN VII): Symmetric facial expression    Motor: Arm left  Paresis: 4/5  Leg left  Paresis: 4/5  Arm right  Normal 5/5  Leg right Normal 5/5  Sensation: Intact to light touch         Laboratory:  CMP:       Recent Labs   Lab 02/07/23  1859   CALCIUM 10.1   ALBUMIN 3.8   PROT 7.9      K 3.4*   CO2 26   CL 99   BUN 13   CREATININE 1.0   ALKPHOS 100   ALT 29   AST 43*   BILITOT 0.7      CBC:       Recent Labs   Lab 02/07/23  1859   WBC 5.06   RBC 5.95   HGB 18.1*   HCT 55.5*      MCV 93   MCH 30.4   MCHC 32.6      Lipid Panel: No results for input(s): CHOL, LDLCALC, HDL, TRIG in the last 168 hours.  Coagulation: No results for input(s): PT, INR, APTT in the last 168 hours.  Hgb A1C: No results for input(s): HGBA1C in the last 168 hours.  TSH: No results for input(s): TSH in the last 168 hours.      Diagnostic Results:     Brain Imaging   CTH 2/7/23   1. Evolving right MCA territory acute infarction without hemorrhagic transformation.  2. Remote lacunar infarction in the left caudate head.     MRI Brain WO Contrast 1/31/23   Extensive foci of diffusion restriction in the right MCA distribution as above, consistent acute right MCA territory infarction.  No evidence of hemorrhagic conversion. Changes of chronic small vessel ischemic disease and cerebral volume loss.     CTH 1/31/23   1. Ill-defined areas of parenchymal hypoattenuation within the right temporoparietal region most concerning for age-indeterminate infarcts.   2. Suspected remote infarcts of the posterior right occipital lobe and bilateral basal ganglia.  3. Chronic microvascular ischemic change.     Vessel Imaging   CTA H/N 1/31/23   Acute right MCA distribution infarct.  Concern for small thrombus with stenosis and or diminished flow involving the inferior branch of the M2 segment of the right MCA. Remote left caudate head lacunar infarct. Bullous emphysematous changes.     US Carotid BL 1/31/23   Abnormal  examination with elevated peak systolic velocity within the right internal carotid artery.  There is also a high resistance waveform within the right common and internal carotid arteries.  Note that the patient was moving during the examination.      Cardiac Imaging   TTE 2/1/23    The left ventricle is normal in size with concentric remodeling and normal systolic function.   The estimated ejection fraction is 68%.   Normal left ventricular diastolic function.   Normal right ventricular size with normal right ventricular systolic function.   Mechanically ventilated; cannot use inferior caval vein diameter to estimate central venous pressure.     TTE 1/31/23    There is no evidence of intracardiac shunting.   The left ventricle is normal in size with concentric remodeling and normal systolic function.   The estimated ejection fraction is 65%.   Normal left ventricular diastolic function.   Normal right ventricular size with normal right ventricular systolic function.   Normal central venous pressure (3 mmHg).   The estimated PA systolic pressure is 7 mmHg.   There is no pulmonary hypertension.      Lalo Colin MD  Comprehensive Stroke Center  Department of Vascular Neurology   [facility] Neurosurgery [facility])

## 2023-02-09 NOTE — CONSULTS
Devin Coleman - Neurosurgery ([facility])  Physical Medicine & Rehab  Consult Note    Patient Name: Gabriel Springer  MRN: [id number]  Admission Date: 2/7/2023  Hospital Length of Stay: 0 days  Attending Physician: Jd Watt MD     Inpatient consult to Physical Medicine & Rehabilitation  Consult performed by: Beverley Mosley NP  Consult requested by:  Jd Watt MD    Collaborating Physician: Kendra Abraham MD  Reason for Consult:  Assess rehabilitation needs     Consults  Subjective:     Principal Problem: Embolic stroke involving right middle cerebral artery    HPI: Gabriel Springer is a 77-year-old male with PMHx of smoking, asthma, and BPH. Patient transferred to [facility] from [facility] on 2/7/23 for R MCA infarct and encephalopathy. MRI with R temporal, parietal, posterior frontal, and subinsular cortex infarcts. TTE unremarkable. Hospital course complicated by urinary retention (UA negative), Seizure (on Keppra BID, EEG negative), HTN (Was on Cardene now improved).     Functional History: Patient lives in senior living center with a friend.  Prior to admission, I. DME: none.       Hospital Course: 2/6/23: Participated w/ PT. Bed mob SV. Sit to stand NADINE. [facility] 18.       Past Medical History:   Diagnosis Date    Prostate atrophy      No past surgical history on file.  Review of patient's allergies indicates:   Allergen Reactions    Penicillins Hives       Scheduled Medications:    amLODIPine  10 mg Oral Daily    aspirin  81 mg Oral Daily    atorvastatin  40 mg Oral Daily    clopidogreL  75 mg Oral Daily    fluticasone furoate-vilanteroL  1 puff Inhalation Daily    heparin (porcine)  5,000 Units Subcutaneous Q8H    montelukast  10 mg Oral Daily    tamsulosin  0.4 mg Oral Daily       PRN Medications: albuterol, sodium chloride 0.9%, sodium chloride 0.9%    Family History    None       Tobacco Use    Smoking status: Some Days     Packs/day: 1.00     Types: Cigarettes    Smokeless tobacco: Not on file    Substance and Sexual Activity    Alcohol use: Yes     Alcohol/week: 1.0 standard drink     Types: 1 Cans of beer per week     Comment: 1/31/23: Denies    Drug use: No    Sexual activity: Not Currently     Review of Systems   Constitutional:  Positive for activity change. Negative for fatigue and fever.   HENT:  Negative for trouble swallowing and voice change.    Respiratory:  Negative for cough and shortness of breath.    Cardiovascular:  Negative for chest pain and leg swelling.   Gastrointestinal:  Negative for abdominal distention and abdominal pain.   Genitourinary:  Positive for difficulty urinating. Negative for flank pain.   Musculoskeletal:  Positive for gait problem. Negative for back pain.   Skin:  Negative for color change and rash.   Neurological:  Positive for weakness. Negative for speech difficulty and numbness.   Psychiatric/Behavioral:  Negative for agitation and confusion.    Objective:     Vital Signs (Most Recent):  Temp: 98.6 °F (37 °C) (02/09/23 0749)  Pulse: 70 (02/09/23 0823)  Resp: 15 (02/09/23 0749)  BP: (!) 174/79 (02/09/23 0749)  SpO2: (!) 93 % (02/09/23 0749)      Vital Signs (24h Range):  Temp:  [97.2 °F (36.2 °C)-98.8 °F (37.1 °C)] 98.6 °F (37 °C)  Pulse:  [68-97] 70  Resp:  [15-18] 15  SpO2:  [93 %-97 %] 93 %  BP: (147-174)/(65-81) 174/79     Body mass index is 16.49 kg/m².    Physical Exam  Vitals and nursing note reviewed.   HENT:      Mouth/Throat:      Mouth: Mucous membranes are moist.   Eyes:      Extraocular Movements: Extraocular movements intact.      Pupils: Pupils are equal, round, and reactive to light.   Musculoskeletal:         General: Normal range of motion.   Neurological:      Mental Status: He is alert.      Motor: Weakness present.      Gait: Gait abnormal.      Comments: Covering self with blanket but participating in exam   Following commands   Psychiatric:         Mood and Affect: Mood normal.      Comments: Camera sitter at      Diagnostic Results:    Labs: Reviewed  ECG: Reviewed  CT: Reviewed    Assessment/Plan:     * Embolic stroke involving right middle cerebral artery  - Related to prolonged/acute hospital course.     Recommendations  -  Encourage mobility, OOB in chair at least 3 hours per day, and early ambulation as appropriate  -  PT/OT evaluate and treat  -  Pain management  -  Monitor for and prevent skin breakdown and pressure ulcers  · Early mobility, repositioning/weight shifting every 20-30 minutes when sitting, turn patient every 2 hours, proper mattress/overlay and chair cushioning, pressure relief/heel protector boots  -  DVT prophylaxis    -  Reviewed discharge options (IP rehab, SNF, HH therapy, and OP therapy)    Essential hypertension  - stroke risk factor    PM&R Recommendation:     At this time, the PM&R team has reviewed this patient's ongoing medical case including inpatient diagnosis, medical history, clinical examination, labs, vitals, current social and functional history to provide the post-acute recommendation as follows:     RECOMMENDATIONS: inpatient rehabilitation due to good motivation/participation with therapies, has been determined to tolerate 3 hours of therapy and good potential for recovery.     The patient will be admitted for comprehensive interdisciplinary inpatient rehabilitation to address the impairments due to medical diagnosis of R MCA. The patient will benefit from an inpatient rehabilitation program to promote functional recovery, implement compensatory strategies and will undergo assessment for needs for durable medical equipment for safe discharge to the community. This patient will benefit from a coordinated interdisciplinary rehabilitation program services that require close monitoring and treatment with 24-hour rehabilitative nursing and physical/occupational therapies for 3 hours/day for 5 days/week.This interdisciplinary program will be performed under the direction of a physiatrist.    We will  continue to follow.    Thank you for your consult.     Beverley Mosley NP  Department of Physical Medicine & Rehab  [facility] - Neurosurgery [facility])

## 2023-02-09 NOTE — SUBJECTIVE & OBJECTIVE
Past Medical History:   Diagnosis Date    Prostate atrophy      No pertinent surgical or family history obtained during this encounter.   Social History     Tobacco Use    Smoking status: Some Days     Packs/day: 1.00     Types: Cigarettes   Substance Use Topics    Alcohol use: Yes     Alcohol/week: 1.0 standard drink     Types: 1 Cans of beer per week     Comment: 1/31/23: Denies    Drug use: No     Review of patient's allergies indicates:   Allergen Reactions    Penicillins Hives       Medications: I have reviewed the current medication administration record.    (Not in a hospital admission)      Review of Systems   Constitutional:  Negative for fever.   HENT:  Negative for drooling.    Eyes:  Positive for visual disturbance.   Respiratory:  Negative for cough.    Cardiovascular:  Negative for chest pain.   Gastrointestinal:  Negative for vomiting.   Genitourinary:  Negative for difficulty urinating.   Musculoskeletal:  Negative for gait problem.   Neurological:  Negative for speech difficulty and weakness.   Psychiatric/Behavioral:  Negative for agitation, behavioral problems and confusion.    Objective:     Vital Signs (Most Recent):  Temp: 98.8 °F (37.1 °C) (02/08/23 1130)  Pulse: 68 (02/08/23 1514)  Resp: 18 (02/08/23 1514)  BP: (!) 147/70 (02/08/23 1514)  SpO2: 97 % (02/08/23 1514)    Vital Signs Range (Last 24H):  Temp:  [98 °F (36.7 °C)-98.8 °F (37.1 °C)]   Pulse:  [68-84]   Resp:  [16-21]   BP: (147-178)/(70-81)   SpO2:  [96 %-97 %]     Physical Exam  Vitals and nursing note reviewed.   Constitutional:       Appearance: He is not diaphoretic.   HENT:      Head: Normocephalic and atraumatic.      Right Ear: External ear normal.      Left Ear: External ear normal.      Nose: No rhinorrhea.   Eyes:      General: No scleral icterus.     Comments: R gaze preference    Cardiovascular:      Rate and Rhythm: Normal rate.   Pulmonary:      Effort: Pulmonary effort is normal. No respiratory distress.    Abdominal:      General: There is no distension.   Musculoskeletal:         General: No tenderness. Normal range of motion.      Cervical back: Normal range of motion.   Skin:     General: Skin is warm and dry.   Neurological:      Mental Status: He is alert and oriented to person, place, and time.      Cranial Nerves: Dysarthria present. No facial asymmetry.      Motor: Weakness present.   Psychiatric:         Behavior: Behavior is cooperative.       Neurological Exam:   LOC: alert  Attention Span: Good   Language: No aphasia  Articulation: Dysarthria  Orientation: Person, Place, Time   EOM (CN III, IV, VI): Gaze preference  right  Facial Movement (CN VII): Symmetric facial expression    Motor: Arm left  Paresis: 4/5  Leg left  Paresis: 4/5  Arm right  Normal 5/5  Leg right Normal 5/5  Sensation: Intact to light touch       Laboratory:  CMP:   Recent Labs   Lab 02/07/23  1859   CALCIUM 10.1   ALBUMIN 3.8   PROT 7.9      K 3.4*   CO2 26   CL 99   BUN 13   CREATININE 1.0   ALKPHOS 100   ALT 29   AST 43*   BILITOT 0.7     CBC:   Recent Labs   Lab 02/07/23  1859   WBC 5.06   RBC 5.95   HGB 18.1*   HCT 55.5*      MCV 93   MCH 30.4   MCHC 32.6     Lipid Panel: No results for input(s): CHOL, LDLCALC, HDL, TRIG in the last 168 hours.  Coagulation: No results for input(s): PT, INR, APTT in the last 168 hours.  Hgb A1C: No results for input(s): HGBA1C in the last 168 hours.  TSH: No results for input(s): TSH in the last 168 hours.    Diagnostic Results:    Brain Imaging   CTH 2/7/23   1. Evolving right MCA territory acute infarction without hemorrhagic transformation.  2. Remote lacunar infarction in the left caudate head.    MRI Brain WO Contrast 1/31/23     Extensive foci of diffusion restriction in the right MCA distribution as above, consistent acute right MCA territory infarction.  No evidence of hemorrhagic conversion. Changes of chronic small vessel ischemic disease and cerebral volume loss.     CTH  1/31/23   1. Ill-defined areas of parenchymal hypoattenuation within the right temporoparietal region most concerning for age-indeterminate infarcts.   2. Suspected remote infarcts of the posterior right occipital lobe and bilateral basal ganglia.  3. Chronic microvascular ischemic change.     Vessel Imaging   CTA H/N 1/31/23   Acute right MCA distribution infarct.  Concern for small thrombus with stenosis and or diminished flow involving the inferior branch of the M2 segment of the right MCA. Remote left caudate head lacunar infarct. Bullous emphysematous changes.     US Carotid BL 1/31/23   Abnormal examination with elevated peak systolic velocity within the right internal carotid artery.  There is also a high resistance waveform within the right common and internal carotid arteries.  Note that the patient was moving during the examination.      Cardiac Imaging   TTE 2/1/23   The left ventricle is normal in size with concentric remodeling and normal systolic function.  The estimated ejection fraction is 68%.  Normal left ventricular diastolic function.  Normal right ventricular size with normal right ventricular systolic function.  Mechanically ventilated; cannot use inferior caval vein diameter to estimate central venous pressure.     TTE 1/31/23   There is no evidence of intracardiac shunting.  The left ventricle is normal in size with concentric remodeling and normal systolic function.  The estimated ejection fraction is 65%.  Normal left ventricular diastolic function.  Normal right ventricular size with normal right ventricular systolic function.  Normal central venous pressure (3 mmHg).  The estimated PA systolic pressure is 7 mmHg.  There is no pulmonary hypertension.

## 2023-02-09 NOTE — ED NOTES
Assumed pt. Care. Pt alert and oriented. Pt wandering from time to time ready to go to rehab. Redirected to stretcher and told he will be going to rehab in the AM. Pt verbalizes understanding and gets back on stretcher.

## 2023-02-09 NOTE — TELEPHONE ENCOUNTER
----- Message from Jd Watt MD sent at 2/5/2023 10:17 AM CST -----  Clinic. [facility].  2-3 wks.  Likely to need carotid angio/stent.    Thanks    Jd

## 2023-02-09 NOTE — H&P
[facility] - Emergency Dept  Vascular Neurology  [facility]  History & Physical    Consults  Assessment/Plan:     Patient is a 77 y.o. year old male with:    * Embolic stroke involving right middle cerebral artery  Mr. Springer is a 78yo M with PMH of smoking, asthma, and BPH. He initially presented as a transfer from the [facility] for R MCA infarct and encephalopathy. Patient went to ED at  for LUE weakness and numbness. He reported having difficulty walking at home. Patient required intubation due to hypoxia at OSH prior to arrival. On arrival to [facility] NIHSS 26. CTA with small thrombus with stenosis/diminshed flow through R M2. No intervention. Patient admitted to [facility]. MRI with R temporal, parietal, posterior frontal, and subinsular cortex infarcts. TTE unremarkable. He was admitted to [facility] and later extubated. Etiology at time of discharge ESUS. 30d cardiac event monitor at discharge. Patient on DAPT at time of discharge Therapy recommended IPR. Patient declined IPR 2/2 agitation but was agreeable to home health with family help.       Patient returned to ED today as he was unable to take care of himself and was not able to receive adequate care at home for his deficits. Stroke team was consulted and therapy has re-evaluated the patient. Recommendations remain for IPR. On re-examination of patient with similar NIHSS to the time of prior discharge. No new or worsening deficits. Will be re-admitted to observation with vascular neurology. Plan is to discharge patient home with IPR (case management has been working with [place] IPR for possible discharge tomorrow). Continue home meds.       Antithrombotics for secondary stroke prevention: Antiplatelets: Aspirin: 81 mg daily  Clopidogrel: 75 mg daily    Statins for secondary stroke prevention and hyperlipidemia, if present:   Statins: Atorvastatin- 40 mg daily    Aggressive risk factor modification: HTN, Smoking, HLD     Rehab efforts: The  patient has been evaluated by a stroke team provider and the therapy needs have been fully considered based off the presenting complaints and exam findings. The following therapy evaluations are needed: PT evaluate and treat, OT evaluate and treat, SLP evaluate and treat, PM&R evaluate for appropriate placement    Diagnostics ordered/pending: None     VTE prophylaxis: Heparin 5000 units SQ every 8 hours  Mechanical prophylaxis: Place SCDs    BP parameters: Infarct: SBP < 180         Essential hypertension  Stroke risk factor  Patient hypertensive as high as 245/121 prior admission.  He is not noted to be on any BP meds at home previously   SBP goal at this time < 180  On amlodipine 10mg at time of discharge, okay to continue       Impaired activities of daily living  Would benefit from IPR placement   PT/OT re-evaluated patient , recommending IPR     Cytotoxic brain edema  Area of cytotoxic cerebral edema identified when reviewing brain imaging. We will continue to monitor the patients clinical exam for any worsening of symptoms which may indicate expansion of the stroke or the area of the edema resulting in the clinical change. The pattern is suggestive of embolic etiology. Repeat CTH PRN for acute neuro exam changes.      Stable at this time but will continue to monitor     Tobacco abuse  Stroke risk factor  - on smoking cessation once appropriate        STROKE DOCUMENTATION          NIH Scale:  1a. Level of Consciousness: 0-->Alert, keenly responsive  1b. LOC Questions: 0-->Answers both questions correctly  1c. LOC Commands: 0-->Performs both tasks correctly  2. Best Gaze: 1-->Partial gaze palsy, gaze is abnormal in one or both eyes, but forced deviation or total gaze paresis is not present  3. Visual: 0-->No visual loss  4. Facial Palsy: 0-->Normal symmetrical movements  5a. Motor Arm, Left: 1-->Drift, limb holds 90 (or 45) degrees, but drifts down before full 10 seconds, does not hit bed or other  support  5b. Motor Arm, Right: 0-->No drift, limb holds 90 (or 45) degrees for full 10 secs  6a. Motor Leg, Left: 1-->Drift, leg falls by the end of the 5-sec period but does not hit bed  6b. Motor Leg, Right: 0-->No drift, leg holds 30 degree position for full 5 secs  7. Limb Ataxia: 0-->Absent  8. Sensory: 0-->Normal, no sensory loss  9. Best Language: 0-->No aphasia, normal  10. Dysarthria: 1-->Mild-to-moderate dysarthria, patient slurs at least some words and, at worst, can be understood with some difficulty  11. Extinction and Inattention (formerly Neglect): 0-->No abnormality  Total (NIH Stroke Scale): 4     Modified Lakshmi Score: 3  [place] Coma Scale:    ABCD2 Score:    [id number] Score:   HAS -BLED Score:   ICH Score:   Hunt & Sierra Classification:      Thrombolysis Candidate? No, Out of window - Symptom onset > 4.5 hours    Delays to Thrombolysis?  Not Applicable    Interventional Revascularization Candidate?   Is the patient eligible for mechanical endovascular reperfusion (CHANTAL)?  no, already has undergone stroke work up and treatment.     Delays to Thrombectomy? Not Applicable    Hemorrhagic change of an Ischemic Stroke: Does this patient have an ischemic stroke with hemorrhagic changes? No         Subjective:     History of Present Illness:  Mr. Springer is a 76yo M with PMH of smoking, asthma, and BPH. He initially presented as a transfer from the [facility] for R MCA infarct and encephalopathy. Patient went to ED at  for LUE weakness and numbness. He reported having difficulty walking at home. Patient required intubation due to hypoxia at OSH prior to arrival. On arrival to [facility] NIHSS 26. CTA with small thrombus with stenosis/diminshed flow through R M2. No intervention. Patient admitted to [facility]. MRI with R temporal, parietal, posterior frontal, and subinsular cortex infarcts. TTE unremarkable. He was admitted to [facility] and later extubated. Etiology at time of discharge ESUS. 30d cardiac event  monitor at discharge. Patient on DAPT at time of discharge Therapy recommended IPR. Patient declined IPR 2/2 agitation but was agreeable to home health with family help.      Patient returned to ED today as he was unable to take care of himself and was not able to receive adequate care at home for his deficits. Stroke team was consulted and therapy has re-evaluated the patient. Recommendations remain for IPR. On re-examination of patient with similar NIHSS to the time of prior discharge. No new or worsening deficits.           Past Medical History:   Diagnosis Date    Prostate atrophy      No pertinent surgical or family history obtained during this encounter.   Social History     Tobacco Use    Smoking status: Some Days     Packs/day: 1.00     Types: Cigarettes   Substance Use Topics    Alcohol use: Yes     Alcohol/week: 1.0 standard drink     Types: 1 Cans of beer per week     Comment: 1/31/23: Denies    Drug use: No     Review of patient's allergies indicates:   Allergen Reactions    Penicillins Hives       Medications: I have reviewed the current medication administration record.    (Not in a hospital admission)      Review of Systems   Constitutional:  Negative for fever.   HENT:  Negative for drooling.    Eyes:  Positive for visual disturbance.   Respiratory:  Negative for cough.    Cardiovascular:  Negative for chest pain.   Gastrointestinal:  Negative for vomiting.   Genitourinary:  Negative for difficulty urinating.   Musculoskeletal:  Negative for gait problem.   Neurological:  Negative for speech difficulty and weakness.   Psychiatric/Behavioral:  Negative for agitation, behavioral problems and confusion.    Objective:     Vital Signs (Most Recent):  Temp: 98.8 °F (37.1 °C) (02/08/23 1130)  Pulse: 68 (02/08/23 1514)  Resp: 18 (02/08/23 1514)  BP: (!) 147/70 (02/08/23 1514)  SpO2: 97 % (02/08/23 1514)    Vital Signs Range (Last 24H):  Temp:  [98 °F (36.7 °C)-98.8 °F (37.1 °C)]   Pulse:  [68-84]    Resp:  [16-21]   BP: (147-178)/(70-81)   SpO2:  [96 %-97 %]     Physical Exam  Vitals and nursing note reviewed.   Constitutional:       Appearance: He is not diaphoretic.   HENT:      Head: Normocephalic and atraumatic.      Right Ear: External ear normal.      Left Ear: External ear normal.      Nose: No rhinorrhea.   Eyes:      General: No scleral icterus.     Comments: R gaze preference    Cardiovascular:      Rate and Rhythm: Normal rate.   Pulmonary:      Effort: Pulmonary effort is normal. No respiratory distress.   Abdominal:      General: There is no distension.   Musculoskeletal:         General: No tenderness. Normal range of motion.      Cervical back: Normal range of motion.   Skin:     General: Skin is warm and dry.   Neurological:      Mental Status: He is alert and oriented to person, place, and time.      Cranial Nerves: Dysarthria present. No facial asymmetry.      Motor: Weakness present.   Psychiatric:         Behavior: Behavior is cooperative.       Neurological Exam:   LOC: alert  Attention Span: Good   Language: No aphasia  Articulation: Dysarthria  Orientation: Person, Place, Time   EOM (CN III, IV, VI): Gaze preference  right  Facial Movement (CN VII): Symmetric facial expression    Motor: Arm left  Paresis: 4/5  Leg left  Paresis: 4/5  Arm right  Normal 5/5  Leg right Normal 5/5  Sensation: Intact to light touch       Laboratory:  CMP:   Recent Labs   Lab 02/07/23  1859   CALCIUM 10.1   ALBUMIN 3.8   PROT 7.9      K 3.4*   CO2 26   CL 99   BUN 13   CREATININE 1.0   ALKPHOS 100   ALT 29   AST 43*   BILITOT 0.7     CBC:   Recent Labs   Lab 02/07/23  1859   WBC 5.06   RBC 5.95   HGB 18.1*   HCT 55.5*      MCV 93   MCH 30.4   MCHC 32.6     Lipid Panel: No results for input(s): CHOL, LDLCALC, HDL, TRIG in the last 168 hours.  Coagulation: No results for input(s): PT, INR, APTT in the last 168 hours.  Hgb A1C: No results for input(s): HGBA1C in the last 168 hours.  TSH: No results  for input(s): TSH in the last 168 hours.    Diagnostic Results:    Brain Imaging   CTH 2/7/23   1. Evolving right MCA territory acute infarction without hemorrhagic transformation.  2. Remote lacunar infarction in the left caudate head.    MRI Brain WO Contrast 1/31/23     Extensive foci of diffusion restriction in the right MCA distribution as above, consistent acute right MCA territory infarction.  No evidence of hemorrhagic conversion. Changes of chronic small vessel ischemic disease and cerebral volume loss.     CTH 1/31/23   1. Ill-defined areas of parenchymal hypoattenuation within the right temporoparietal region most concerning for age-indeterminate infarcts.   2. Suspected remote infarcts of the posterior right occipital lobe and bilateral basal ganglia.  3. Chronic microvascular ischemic change.     Vessel Imaging   CTA H/N 1/31/23   Acute right MCA distribution infarct.  Concern for small thrombus with stenosis and or diminished flow involving the inferior branch of the M2 segment of the right MCA. Remote left caudate head lacunar infarct. Bullous emphysematous changes.     US Carotid BL 1/31/23   Abnormal examination with elevated peak systolic velocity within the right internal carotid artery.  There is also a high resistance waveform within the right common and internal carotid arteries.  Note that the patient was moving during the examination.      Cardiac Imaging   TTE 2/1/23    The left ventricle is normal in size with concentric remodeling and normal systolic function.   The estimated ejection fraction is 68%.   Normal left ventricular diastolic function.   Normal right ventricular size with normal right ventricular systolic function.   Mechanically ventilated; cannot use inferior caval vein diameter to estimate central venous pressure.     TTE 1/31/23    There is no evidence of intracardiac shunting.   The left ventricle is normal in size with concentric remodeling and normal systolic  function.   The estimated ejection fraction is 65%.   Normal left ventricular diastolic function.   Normal right ventricular size with normal right ventricular systolic function.   Normal central venous pressure (3 mmHg).   The estimated PA systolic pressure is 7 mmHg.   There is no pulmonary hypertension.             Humberto Beaver PA-C  [facility]  Department of Vascular Neurology   [facility] - Emergency Dept

## 2023-02-09 NOTE — HOSPITAL COURSE
Hospital Course (synopsis of major diagnoses, care, treatment, and services provided during the course of the hospital stay): Patient admitted to Neuro ICU on 1/31/23 for embolic stroke of the right MCA, etiology of undetermined source. On arrival NIHSS 26, following intubation, MRI with R temporal/parietal lobe infarcts and R subinsular cortex and posterior frontal lobe, CTA with small thrombus with stenosis /diminshed flow through R M2. EEG was negative with normal EF and no WMA on echo. He was extubated on 2/1/23 and stepped down to NPU for further monitoring as well as PT/OT/SLP. He was started on DAPT, HIS with BP control. He tolerated PO well with active bowel movements. Progressive neurological improvement (NIH 4) with PT/OT recommending IPR however the patient declined and said he would not go. While admitted patient had urinary retention, placed was ramirez and started silodosin, however patient self removed ramirez on 2/5. Patient passed self voiding trial with adequate UOP before discharge. He will need to follow up outpatient with vascular neurology for further evaluation of carotid disease and possible interventions. He will also need a cardiac event monitor for further investigation of a possible source. On 2/8/2023, Patient re-admitted after reporting to the ED that he could not care for himself and now wants to be placed in IPR.      2/2/2023 Extubated yesterday (2/1).  Tolerating well.  Off Precedex since 0900 today, redirectable.  Therapy recommending IPR.  Awaiting stepdown to NPU.  02/03/2023 Patient with urinary retention overnight, silodosin initiated. Patient required Haldol injections overnight. Seroquel scheduled. Patient is only on ASA at this time. Recommend DAPT. Etiology ESUS at this time. Will need cardiac event monitor at discharge. Therapy with recs for IPR and minced & moist diet with thin liquids.   2/4/2023: KRYSTIAN, neuro stable, patient agitated with nursing staff and doesn't like to  be bothered, continued on DAPT, BP fluctuating, will start Norvasc 5 mg. Tolerating PO. Will need placement for IPR.    2/5/2023: KRYSTIAN, neuro stable- NIH 7, tolerating PO with active bowel movements. Pt has been declining straight cath, will need to place ramirez if he continues. Increasing Norvasc to 10 mg daily. Pending inpatient rehab placement.   2/6/2023: Neuro stable- NIH 5, patient pulled out ramirez overnight, BP controlled, patient declining IPR and trying to leave. Will order home health today. Patient was able to void on his own in the restroom with good UOP.  2/8/2023: Pt presented to the ED with complaints that he was unable to take care of himself and would like placement in inpatient rehab, of which he previously declined. NIH stable, continued home medications.       Gabriel Springer was hemodynamically stable, afebrile, with improved left sided weakness on discharge.      Brief Discharge Plan:  - Scheduled follow-up appointment with Scott Guerra Jr, MD, patient's primary care provider  - Hospital discharge follow up was scheduled with vascular neurology in 4 weeks for carotid disease.   - Patient transferred to Inpatient rehab  - Ordered Cardiac Event monitor.   - Education was provided to the patient and family regarding patient's disease and treatment options  - A comprehensive and reconciled medication list was provided on discharge to the patient   - Resumed all home medications   - Started Asa 81 mg, Plavix 75 mg, Lipitor 40 mg, Norvasc 10 mg. Please continue taking your  medications as prescribed.   - No pending labs or imaging on discharge        Pt Gabriel Springer was admitted to the medicine service on 2/8/2023 and discharged on 2/9/2023 for the treatment of: placement of inpatient rehab 2/2 Embolic stroke of the right CVA.     Advised at discharge to:     Please follow up with your Primary Care Physician if symptoms persist and for medication management as needed.     Please return to the  Emergency Department if you begin to experience worsening symptoms of weakness, altered mental status, slurred speech and if you begin to experience fever, chills, nausea or vomiting.     Please continue home medications as prescribed.

## 2023-02-09 NOTE — NURSING
Nurses Note -- 4 Eyes      2/9/2023   1:51 AM      Skin assessed during: Admit      [x] No Pressure Injuries Present    [x]Prevention Measures Documented      [] Yes- Altered Skin Integrity Present or Discovered   [] LDA Added if Not in Epic (Describe Wound)   [] New Altered Skin Integrity was Present on Admit and Documented in LDA   [] Wound Image Taken    Wound Care Consulted? Yes    Attending Nurse:  Raven Lopez RN     Second RN/Staff Member:  Roro WINTER RN    Skin dry, flaky

## 2023-02-09 NOTE — PLAN OF CARE
[facility]    FACILITY TRANSFER ORDERS      Patient Name: Gabriel Springer  YOB: 1945    PCP: Scott Guerra Jr, MD   [facility] [address] [facility]  PCP Phone Number: [phone]  PCP Fax: [phone]    Encounter Date: 02/09/2023    Admit to: Inpatient Rehab    Vital Signs:  Routine    Diagnoses:   Active Hospital Problems    Diagnosis  POA    *Embolic stroke involving right middle cerebral artery [I63.411]  Unknown    Tobacco abuse [Z72.0]  Yes    Cytotoxic brain edema [G93.6]  Yes    Impaired activities of daily living [Z78.9]  Yes    Essential hypertension [I10]  Yes      Resolved Hospital Problems   No resolved problems to display.       Allergies:  Review of patient's allergies indicates:   Allergen Reactions    Penicillins Hives       Diet: regular diet    Activities: Activity as tolerated    Goals of Care Treatment Preferences:  Code Status: Full Code      Nursing: Per facility      Labs: Per facility    CONSULTS:    Physical Therapy to evaluate and treat. , Occupational Therapy to evaluate and treat., and Speech Therapy to evaluate and treat for Language and Swallowing.    MISCELLANEOUS CARE:  Per Facility    WOUND CARE ORDERS  Per facility    Medications: Review discharge medications with patient and family and provide education.      Current Discharge Medication List        CONTINUE these medications which have NOT CHANGED    Details   albuterol (PROVENTIL/VENTOLIN HFA) 90 mcg/actuation inhaler Inhale 2 puffs into the lungs every 6 (six) hours as needed (cough and shortness of breath). Rescue  Qty: 1 Inhaler, Refills: 0      albuterol sulfate 2.5 mg/0.5 mL Nebu Take 2.5 mg by nebulization every 4 (four) hours as needed. Rescue  Qty: 30 each, Refills: 0      amLODIPine (NORVASC) 10 MG tablet Take 1 tablet (10 mg total) by mouth once daily.  Qty: 90 tablet, Refills: 2    Comments: .      aspirin 81 MG Chew Take 1 tablet (81 mg total) by  mouth once daily.  Qty: 90 tablet, Refills: 3      atorvastatin (LIPITOR) 40 MG tablet Take 1 tablet (40 mg total) by mouth once daily.  Qty: 90 tablet, Refills: 3      clopidogreL (PLAVIX) 75 mg tablet Take 1 tablet (75 mg total) by mouth once daily.  Qty: 90 tablet, Refills: 3      montelukast (SINGULAIR) 10 mg tablet Take 10 mg by mouth once daily.      SYMBICORT 160-4.5 mcg/actuation HFAA Inhale 2 puffs into the lungs 2 (two) times a day.      tamsulosin (FLOMAX) 0.4 mg Cap Take 0.4 mg by mouth once daily.      UNABLE TO FIND Take 2.5 mg by mouth nightly. medication name:  sleep aid (unknown name)                Immunizations Administered as of 2/9/2023       Name Date Dose VIS Date Route Exp Date    COVID-19, MRNA, LN-S, PF (Pfizer) (Purple Cap) 3/11/2021 0.3 mL -- Intramuscular --    Site: Right deltoid     : [employer]     Lot: [id number]     External: Auto Reconciled From Outside Source     Comment: Adminis     COVID-19, MRNA, LN-S, PF (Pfizer) (Purple Cap) 2/18/2021 0.3 mL -- Intramuscular --    Site: Left deltoid     : Pf[employer]     Lot: [id number]     External: Auto Reconciled From Outside Source     Comment: Adminis             This patient has had both covid vaccinations    Some patients may experience side effects after vaccination.  These may include fever, headache, muscle or joint aches.  Most symptoms resolve with 24-48 hours and do not require urgent medical evaluation unless they persist for more than 72 hours or symptoms are concerning for an unrelated medical condition.          _________________________________  Lalo Colin MD  02/09/2023

## 2023-02-09 NOTE — PLAN OF CARE
HARPREET put an order for transportation as told tvia phone by supervisor channing JORGENSEN For 8 am with [facility].     HARPREET contacted patient flow center and spoke with Johanna. According to Johanna. [facility] van service is not running and she can not put that order in until 8am. Johanna stated that she will waive the order until morning and have [facility] contacted by morning shift to schedule transport for this patient to [facility].     MARIANO Lopez, MSW-LMSW  Medical Social Worker/  ER Department

## 2023-02-09 NOTE — HPI
Mr. Springer is a 78yo M with PMH of smoking, asthma, and BPH. He initially presented as a transfer from the [facility] for R MCA infarct and encephalopathy. Patient went to ED at  for LUE weakness and numbness. He reported having difficulty walking at home. Patient required intubation due to hypoxia at OSH prior to arrival. On arrival to [facility] NIHSS 26. CTA with small thrombus with stenosis/diminshed flow through R M2. No intervention. Patient admitted to [facility]. MRI with R temporal, parietal, posterior frontal, and subinsular cortex infarcts. TTE unremarkable. He was admitted to [facility] and later extubated. Etiology at time of discharge ESUS. 30d cardiac event monitor at discharge. Patient on DAPT at time of discharge Therapy recommended IPR. Patient declined IPR 2/2 agitation but was agreeable to home health with family help.      Patient returned to ED today as he was unable to take care of himself and was not able to receive adequate care at home for his deficits. Stroke team was consulted and therapy has re-evaluated the patient. Recommendations remain for IPR. On re-examination of patient with similar NIHSS to the time of prior discharge. No new or worsening deficits.

## 2023-02-09 NOTE — SUBJECTIVE & OBJECTIVE
Past Medical History:   Diagnosis Date    Prostate atrophy      No past surgical history on file.  Review of patient's allergies indicates:   Allergen Reactions    Penicillins Hives       Scheduled Medications:    amLODIPine  10 mg Oral Daily    aspirin  81 mg Oral Daily    atorvastatin  40 mg Oral Daily    clopidogreL  75 mg Oral Daily    fluticasone furoate-vilanteroL  1 puff Inhalation Daily    heparin (porcine)  5,000 Units Subcutaneous Q8H    montelukast  10 mg Oral Daily    tamsulosin  0.4 mg Oral Daily       PRN Medications: albuterol, sodium chloride 0.9%, sodium chloride 0.9%    Family History    None       Tobacco Use    Smoking status: Some Days     Packs/day: 1.00     Types: Cigarettes    Smokeless tobacco: Not on file   Substance and Sexual Activity    Alcohol use: Yes     Alcohol/week: 1.0 standard drink     Types: 1 Cans of beer per week     Comment: 1/31/23: Denies    Drug use: No    Sexual activity: Not Currently     Review of Systems   Constitutional:  Positive for activity change. Negative for fatigue and fever.   HENT:  Negative for trouble swallowing and voice change.    Respiratory:  Negative for cough and shortness of breath.    Cardiovascular:  Negative for chest pain and leg swelling.   Gastrointestinal:  Negative for abdominal distention and abdominal pain.   Genitourinary:  Positive for difficulty urinating. Negative for flank pain.   Musculoskeletal:  Positive for gait problem. Negative for back pain.   Skin:  Negative for color change and rash.   Neurological:  Positive for weakness. Negative for speech difficulty and numbness.   Psychiatric/Behavioral:  Negative for agitation and confusion.    Objective:     Vital Signs (Most Recent):  Temp: 98.6 °F (37 °C) (02/09/23 0749)  Pulse: 70 (02/09/23 0823)  Resp: 15 (02/09/23 0749)  BP: (!) 174/79 (02/09/23 0749)  SpO2: (!) 93 % (02/09/23 0749)      Vital Signs (24h Range):  Temp:  [97.2 °F (36.2 °C)-98.8 °F (37.1 °C)] 98.6 °F (37 °C)  Pulse:   [68-97] 70  Resp:  [15-18] 15  SpO2:  [93 %-97 %] 93 %  BP: (147-174)/(65-81) 174/79     Body mass index is 16.49 kg/m².    Physical Exam  Vitals and nursing note reviewed.   HENT:      Mouth/Throat:      Mouth: Mucous membranes are moist.   Eyes:      Extraocular Movements: Extraocular movements intact.      Pupils: Pupils are equal, round, and reactive to light.   Musculoskeletal:         General: Normal range of motion.   Neurological:      Mental Status: He is alert.      Motor: Weakness present.      Gait: Gait abnormal.      Comments: Covering self with blanket but participating in exam   Following commands   Psychiatric:         Mood and Affect: Mood normal.      Comments: Camera sitter at bs     NEUROLOGICAL EXAMINATION:     CRANIAL NERVES     CN III, IV, VI   Pupils are equal, round, and reactive to light.    Diagnostic Results: Labs: Reviewed  ECG: Reviewed  CT: Reviewed

## 2023-02-13 ENCOUNTER — HOME CARE VISIT (OUTPATIENT)
Dept: NEUROLOGY | Facility: HOSPITAL | Age: 78
End: 2023-02-13
Payer: MEDICARE

## 2023-02-14 NOTE — PROGRESS NOTES
Mr. Springer is inpatient @  rehab. Stroke mobile contact information and stroke education guide left for patient.

## 2023-02-17 ENCOUNTER — TELEPHONE (OUTPATIENT)
Dept: NEUROLOGY | Facility: CLINIC | Age: 78
End: 2023-02-17
Payer: MEDICARE

## 2023-02-17 NOTE — TELEPHONE ENCOUNTER
"Called and left voice mail w clinic number to schedule patients "[facility] Up // Carotid Stent Angio Eval" appt with LUIZ Burk on 3/1 at 1130.   Appt letter sent.   "

## 2023-03-14 ENCOUNTER — HOME CARE VISIT (OUTPATIENT)
Dept: NEUROLOGY | Facility: HOSPITAL | Age: 78
End: 2023-03-14
Payer: MEDICARE

## 2023-04-15 ENCOUNTER — HOSPITAL ENCOUNTER (EMERGENCY)
Facility: HOSPITAL | Age: 78
Discharge: HOME | End: 2023-04-15
Attending: EMERGENCY MEDICINE
Payer: MEDICARE

## 2023-04-15 VITALS
HEART RATE: 65 BPM | SYSTOLIC BLOOD PRESSURE: 148 MMHG | DIASTOLIC BLOOD PRESSURE: 54 MMHG | OXYGEN SATURATION: 96 % | TEMPERATURE: 97 F | HEIGHT: 72 IN | RESPIRATION RATE: 20 BRPM | BODY MASS INDEX: 16.93 KG/M2 | WEIGHT: 125 LBS

## 2023-04-15 DIAGNOSIS — R06.02 SOB (SHORTNESS OF BREATH): ICD-10-CM

## 2023-04-15 DIAGNOSIS — I20.89 STABLE ANGINA: ICD-10-CM

## 2023-04-15 DIAGNOSIS — J44.1 COPD EXACERBATION: Primary | ICD-10-CM

## 2023-04-15 LAB
ALBUMIN SERPL BCP-MCNC: 3.3 G/DL (ref 3.5–5.2)
ALP SERPL-CCNC: 110 U/L (ref 55–135)
ALT SERPL W/O P-5'-P-CCNC: 14 U/L (ref 10–44)
ANION GAP SERPL CALC-SCNC: 9 MMOL/L (ref 8–16)
AST SERPL-CCNC: 17 U/L (ref 10–40)
BASOPHILS # BLD AUTO: 0.03 K/UL (ref 0–0.2)
BASOPHILS NFR BLD: 0.5 % (ref 0–1.9)
BILIRUB SERPL-MCNC: 0.7 MG/DL (ref 0.1–1)
BNP SERPL-MCNC: 53 PG/ML (ref 0–99)
BUN SERPL-MCNC: 9 MG/DL (ref 8–23)
CALCIUM SERPL-MCNC: 9.1 MG/DL (ref 8.7–10.5)
CHLORIDE SERPL-SCNC: 104 MMOL/L (ref 95–110)
CO2 SERPL-SCNC: 30 MMOL/L (ref 23–29)
CREAT SERPL-MCNC: 0.8 MG/DL (ref 0.5–1.4)
DIFFERENTIAL METHOD: ABNORMAL
EOSINOPHIL # BLD AUTO: 0.4 K/UL (ref 0–0.5)
EOSINOPHIL NFR BLD: 7.1 % (ref 0–8)
ERYTHROCYTE [DISTWIDTH] IN BLOOD BY AUTOMATED COUNT: 14.5 % (ref 11.5–14.5)
EST. GFR  (NO RACE VARIABLE): >60 ML/MIN/1.73 M^2
GLUCOSE SERPL-MCNC: 97 MG/DL (ref 70–110)
HCT VFR BLD AUTO: 41.3 % (ref 40–54)
HGB BLD-MCNC: 13.6 G/DL (ref 14–18)
IMM GRANULOCYTES # BLD AUTO: 0.01 K/UL (ref 0–0.04)
IMM GRANULOCYTES NFR BLD AUTO: 0.2 % (ref 0–0.5)
LYMPHOCYTES # BLD AUTO: 2.5 K/UL (ref 1–4.8)
LYMPHOCYTES NFR BLD: 43 % (ref 18–48)
MCH RBC QN AUTO: 28 PG (ref 27–31)
MCHC RBC AUTO-ENTMCNC: 32.9 G/DL (ref 32–36)
MCV RBC AUTO: 85 FL (ref 82–98)
MONOCYTES # BLD AUTO: 0.5 K/UL (ref 0.3–1)
MONOCYTES NFR BLD: 8.8 % (ref 4–15)
NEUTROPHILS # BLD AUTO: 2.4 K/UL (ref 1.8–7.7)
NEUTROPHILS NFR BLD: 40.4 % (ref 38–73)
NRBC BLD-RTO: 0 /100 WBC
PLATELET # BLD AUTO: 200 K/UL (ref 150–450)
PMV BLD AUTO: 10.8 FL (ref 9.2–12.9)
POTASSIUM SERPL-SCNC: 3.7 MMOL/L (ref 3.5–5.1)
PROT SERPL-MCNC: 6.7 G/DL (ref 6–8.4)
RBC # BLD AUTO: 4.86 M/UL (ref 4.6–6.2)
SODIUM SERPL-SCNC: 143 MMOL/L (ref 136–145)
TROPONIN I SERPL DL<=0.01 NG/ML-MCNC: 0.01 NG/ML (ref 0–0.03)
WBC # BLD AUTO: 5.81 K/UL (ref 3.9–12.7)

## 2023-04-15 PROCEDURE — 99285 EMERGENCY DEPT VISIT HI MDM: CPT | Mod: 25

## 2023-04-15 PROCEDURE — 83880 ASSAY OF NATRIURETIC PEPTIDE: CPT | Performed by: EMERGENCY MEDICINE

## 2023-04-15 PROCEDURE — 85025 COMPLETE CBC W/AUTO DIFF WBC: CPT | Performed by: EMERGENCY MEDICINE

## 2023-04-15 PROCEDURE — 93010 ELECTROCARDIOGRAM REPORT: CPT | Mod: ,,, | Performed by: INTERNAL MEDICINE

## 2023-04-15 PROCEDURE — 93005 ELECTROCARDIOGRAM TRACING: CPT

## 2023-04-15 PROCEDURE — 80053 COMPREHEN METABOLIC PANEL: CPT | Performed by: EMERGENCY MEDICINE

## 2023-04-15 PROCEDURE — 93010 EKG 12-LEAD: ICD-10-PCS | Mod: ,,, | Performed by: INTERNAL MEDICINE

## 2023-04-15 PROCEDURE — 84484 ASSAY OF TROPONIN QUANT: CPT | Performed by: EMERGENCY MEDICINE

## 2023-04-15 RX ORDER — ALBUTEROL SULFATE 90 UG/1
2 AEROSOL, METERED RESPIRATORY (INHALATION) EVERY 6 HOURS PRN
Qty: 18 G | Refills: 0 | Status: SHIPPED | OUTPATIENT
Start: 2023-04-15 | End: 2023-04-20

## 2023-04-15 RX ORDER — PREDNISONE 50 MG/1
50 TABLET ORAL DAILY
Qty: 10 TABLET | Refills: 0 | OUTPATIENT
Start: 2023-04-15 | End: 2023-05-05

## 2023-04-15 RX ORDER — DOXYCYCLINE 100 MG/1
100 CAPSULE ORAL 2 TIMES DAILY
Qty: 20 CAPSULE | Refills: 0 | Status: SHIPPED | OUTPATIENT
Start: 2023-04-15 | End: 2023-04-25

## 2023-04-15 NOTE — ED PROVIDER NOTES
Encounter Date: 4/15/2023    SCRIBE #1 NOTE: I, Virginia Ray, am scribing for, and in the presence of,  Leatha Pinedo MD. I have scribed the following portions of the note - Other sections scribed: HPI, ROS, PE.     History     Chief Complaint   Patient presents with    Shortness of Breath     Sob since 8 am used his inhaler w some relief, 1 duo neb w ems     Gabriel Springer is a 78 y.o. male, with a PMHx of HTN, stroke, and asthma, who presents to the ED with exacerbated asthma starting this AM. Patient reports laying down watching television when he started having difficulty breathing. He reports symptoms improving after using his inhaler and receiving a neb by EMS. He states he normally uses his inahler 3 times per day but has been using it 6 times per day the last few days. He reports he has SOB when ambulating but states it improves when sitting down and using the inhaler. Patient's inhaler is currently out of medication and patient was unaware there was a counter on the back.   Patient reports a prior stroke and being admitted to [facility] on February 7, 2023 for 3 weeks for therapy to regain strength. He reports residual left hand weakness and states he takes aspirin for his stroke. No other exacerbating or alleviating factors. Denies other associated symptoms.  Denies recent antibiotics, steroid use or hospitalizations.  Denies history of intubation or CPAP    The history is provided by the patient. No  was used.   Review of patient's allergies indicates:   Allergen Reactions    Penicillins Hives     Past Medical History:   Diagnosis Date    Prostate atrophy      History reviewed. No pertinent surgical history.  No family history on file.  Social History     Tobacco Use    Smoking status: Some Days     Packs/day: 1.00     Types: Cigarettes   Substance Use Topics    Alcohol use: Not Currently     Alcohol/week: 1.0 standard drink     Types: 1 Cans of beer per week     Comment:  1/31/23: Denies    Drug use: No     Review of Systems   Constitutional:  Negative for activity change, chills and fever.   HENT:  Negative for congestion, postnasal drip, rhinorrhea, sinus pressure, sore throat, trouble swallowing and voice change.    Eyes:  Negative for pain and redness.   Respiratory:  Positive for shortness of breath. Negative for apnea, cough, choking, chest tightness, wheezing and stridor.    Cardiovascular:  Negative for chest pain, palpitations and leg swelling.   Gastrointestinal:  Negative for abdominal pain, constipation, diarrhea, nausea and vomiting.   Genitourinary:  Negative for dysuria, flank pain, hematuria, penile pain and urgency.   Musculoskeletal:  Negative for arthralgias, back pain, gait problem, joint swelling, myalgias, neck pain and neck stiffness.        (+) Left hand weakness.   Skin:  Negative for color change, pallor, rash and wound.   Neurological:  Negative for dizziness, tremors, seizures, syncope and light-headedness.   Hematological:  Negative for adenopathy.   Psychiatric/Behavioral:  Negative for agitation, confusion and dysphoric mood. The patient is not nervous/anxious.      Physical Exam     Initial Vitals [04/15/23 0940]   BP Pulse Resp Temp SpO2   (!) 148/54 63 20 97.1 °F (36.2 °C) 99 %      MAP       --         Physical Exam    Nursing note and vitals reviewed.  Constitutional: He appears well-developed and well-nourished. He is not diaphoretic. No distress.   HENT:   Head: Normocephalic and atraumatic.   Right Ear: External ear normal.   Left Ear: External ear normal.   Nose: Nose normal.   Mouth/Throat: No oropharyngeal exudate.   Eyes: Conjunctivae and EOM are normal. Pupils are equal, round, and reactive to light. Right eye exhibits no discharge. Left eye exhibits no discharge. No scleral icterus.   Neck: Neck supple. No thyromegaly present. No tracheal deviation present. No JVD present.   Normal range of motion.  Cardiovascular:  Normal rate, regular  rhythm, normal heart sounds and intact distal pulses.     Exam reveals no gallop and no friction rub.       No murmur heard.  Pulmonary/Chest: No stridor. No respiratory distress. He has no wheezes. He has no rhonchi. He has no rales. He exhibits no tenderness.   (+) Tachypneic.   Abdominal: Abdomen is soft. Bowel sounds are normal. He exhibits no distension and no mass. There is no abdominal tenderness. There is no rebound and no guarding.   Musculoskeletal:         General: No tenderness or edema. Normal range of motion.      Cervical back: Normal range of motion and neck supple.      Comments: (+) Muscle wasting to left hand.     Lymphadenopathy:     He has no cervical adenopathy.   Neurological: He is alert and oriented to person, place, and time. He has normal strength and normal reflexes. He displays normal reflexes. No cranial nerve deficit or sensory deficit.   Skin: Skin is warm and dry. Capillary refill takes less than 2 seconds. No rash and no abscess noted. No erythema. No pallor.   Psychiatric: He has a normal mood and affect. His behavior is normal. Judgment and thought content normal.       ED Course   Procedures  Labs Reviewed   CBC W/ AUTO DIFFERENTIAL - Abnormal; Notable for the following components:       Result Value    Hemoglobin 13.6 (*)     All other components within normal limits   COMPREHENSIVE METABOLIC PANEL - Abnormal; Notable for the following components:    CO2 30 (*)     Albumin 3.3 (*)     All other components within normal limits   TROPONIN I   B-TYPE NATRIURETIC PEPTIDE     EKG Readings: (Independently Interpreted)   Initial Reading: No STEMI. Rhythm: Normal Sinus Rhythm. Heart Rate: 64. Ectopy: No Ectopy. Conduction: Normal. ST Segments: Normal ST Segments. T Waves: Normal. Axis: Normal.     Imaging Results              X-Ray Chest AP Portable (Final result)  Result time 04/15/23 11:04:38      Final result by Ricky Hi MD (04/15/23 11:04:38)                   Impression:  "     Chronic findings.  No detrimental change when compared with 02/01/2023.      Electronically signed by: Ricky Hi MD  Date:    04/15/2023  Time:    11:04               Narrative:    EXAMINATION:  XR CHEST AP PORTABLE    CLINICAL HISTORY:  Provided history is "  Shortness of breath".    TECHNIQUE:  One view of the chest.    COMPARISON:  02/01/2023.    FINDINGS:  Cardiac wires overlie the chest.  Cardiomediastinal silhouette is stable.  Atherosclerotic calcifications overlie the aortic arch.  Similar to mildly improved aeration when compared with the recent prior exam.  Probable scarring and calcifications in the bilateral lung apices.  Pulmonary emphysema is also suspected.  No new confluent area of consolidation.  No large pleural effusion.  No distinct pneumothorax.                                    X-Rays:   Independently Interpreted Readings:   Other Readings:  Chest x-ray was reviewed.  There is evidence of hyperinflation consistent with COPD.  There is no focal consolidation, pneumothorax, pleural effusion or cardiomegaly  Medications - No data to display  Medical Decision Making:   History:   Old Medical Records: I decided to obtain old medical records.  Clinical Tests:   Lab Tests: Ordered and Reviewed  Radiological Study: Ordered and Reviewed  ED Management:  78-year-old male with a history of multiple medical problems presents emergency department complaining of shortness of breath as well as stable dyspnea on exertion.  Patient has been attributing his symptoms to COPD exacerbation.  He was unaware that he was out of albuterol in his inhaler.  On exam he is afebrile with stable vital signs.  He is nontoxic and well-appearing.  He is not wheezing.  There is no increased work of breathing or respiratory distress.  He did receive a nebulizer treatment by EMS prior to arrival.  There is no history of DVT or pulmonary embolism.  He is no known risk factors for PE or DVT.  There is no sign of " infection on exam.  Patient has equal blood pressures in the upper extremities denies chest pain making aortic dissection unlikely He denies chest pain making ACS unlikely.  Differential diagnosis includes was not limited to COPD exacerbation, stable angina, electrolyte abnormality, symptomatic anemia, cardiac arrhythmia.  EKG as above.  Chest x-ray was reviewed.  There is evidence of COPD however there is no focal consolidation, pneumothorax, pleural effusion or cardiomegaly.  Labs were reviewed and are grossly unremarkable.  We will refill the patient's albuterol as well as discharged with a steroid burst and doxycycline for COPD exacerbation.  I have counseled him to follow up with Cardiology for further evaluation and workup of possible stable angina.  Given the duration of symptoms, I doubt acute coronary syndrome.  There is no indication for emergent admission or further workup at this time.  Return precautions were given.  He agrees with this plan.        Scribe Attestation:   Scribe #1: I performed the above scribed service and the documentation accurately describes the services I performed. I attest to the accuracy of the note.                 I personally performed the services described in this documentation. All medical record entries made by the scribe were at my direction and in my presence. I have reviewed the chart and agree that the record reflects my personal performance and is accurate and complete.   Clinical Impression:   Final diagnoses:  [R06.02] SOB (shortness of breath)  [J44.1] COPD exacerbation (Primary)  [I20.8] Stable angina        ED Disposition Condition    Discharge Stable          ED Prescriptions    None       Follow-up Information       Follow up With Specialties Details Why Contact Info    Scott Guerra Jr., MD Hospitalist, Family Medicine Schedule an appointment as soon as possible for a visit in 1 week  [address]  [address]  [facility]  [facility] [address]  [phone]      Surjit Hewitt MD Cardiovascular Disease, Interventional Cardiology, Cardiology Schedule an appointment as soon as possible for a visit in 1 week  [address]  [address]  [facility] [address]  [phone]               Leatha Pinedo MD  04/15/23 1113

## 2023-04-15 NOTE — DISCHARGE INSTRUCTIONS
Take medications as directed.  Follow-up with your primary care doctor as well as Cardiology.  Return to the emergency department for any new or worsening complaints.

## 2023-04-15 NOTE — ED TRIAGE NOTES
Pt to ED via EMS c/o SOB x this morning. Pt has a hx of asthma and used his inhaler this morning with no relief. EMS reports pt RA sat was 99% at home but RR was rapid. Pt currently getting duoneb treatment started by EMS and reporting relief from SOB. Pt denies chest pain. Pt report stroke this past year. Pt denies any weakness.

## 2023-04-17 ENCOUNTER — HOME CARE VISIT (OUTPATIENT)
Dept: NEUROLOGY | Facility: HOSPITAL | Age: 78
End: 2023-04-17
Payer: MEDICARE

## 2023-04-17 NOTE — PROGRESS NOTES
Unable to reach patient to schedule nor conduct monthly SM visits. Patient is D/C from SM program due to lack of access.

## 2023-05-05 ENCOUNTER — HOSPITAL ENCOUNTER (EMERGENCY)
Facility: HOSPITAL | Age: 78
Discharge: HOME | End: 2023-05-05
Attending: EMERGENCY MEDICINE
Payer: MEDICARE

## 2023-05-05 VITALS
DIASTOLIC BLOOD PRESSURE: 60 MMHG | SYSTOLIC BLOOD PRESSURE: 125 MMHG | WEIGHT: 122 LBS | OXYGEN SATURATION: 97 % | HEART RATE: 77 BPM | HEIGHT: 73 IN | BODY MASS INDEX: 16.17 KG/M2 | TEMPERATURE: 98 F | RESPIRATION RATE: 18 BRPM

## 2023-05-05 DIAGNOSIS — J44.9 CHRONIC OBSTRUCTIVE PULMONARY DISEASE, UNSPECIFIED COPD TYPE: Primary | ICD-10-CM

## 2023-05-05 PROCEDURE — 99283 EMERGENCY DEPT VISIT LOW MDM: CPT

## 2023-05-05 RX ORDER — PREDNISONE 10 MG/1
10 TABLET ORAL DAILY
Qty: 21 TABLET | Refills: 0 | OUTPATIENT
Start: 2023-05-05 | End: 2023-10-30

## 2023-05-05 NOTE — DISCHARGE INSTRUCTIONS
You have been prescribed a prednisone taper for your COPD.  Continue to use your albuterol inhaler as needed.  Schedule close follow-up with primary care as well as pulmonology.  Return to the emergency department for any new, worsening or significantly concerning symptoms    Thank you for coming to our Emergency Department today. It is important to remember that some problems are difficult to diagnose and may not be found during your first visit. Be sure to follow up with your primary care doctor and review any labs/imaging that was performed with them. If you do not have a primary care doctor, you may contact the one listed on your discharge paperwork or you may also call the [facility] Appointment Desk at [phone] to schedule an appointment with one.     All medications may potentially have side effects and it is impossible to predict which medications may give you side effects. If you feel that you are having a negative effect of any medication you should immediately stop taking them and seek medical attention.    Return to the ER with any questions/concerns, new/concerning symptoms, worsening or failure to improve. Do not drive or make any important decisions for 24 hours if you have received any pain medications, sedatives or mood altering drugs during your ER visit.

## 2023-05-05 NOTE — ED NOTES
Patient c/o Soa with exertion. Patient states he was here for same complaints approximately one week ago and was given a 10 day supply of steroids. States he felt exceptionally better with the steroids, but ran out. States he would like a refill of the steroids.

## 2023-05-05 NOTE — ED PROVIDER NOTES
Encounter Date: 5/5/2023    SCRIBE #1 NOTE: I, Yari Sosa, am scribing for, and in the presence of,  Amari Palomo MD. I have scribed the following portions of the note - Other sections scribed: HPI, ROS, PE, Differential.     History     Chief Complaint   Patient presents with    Shortness of Breath     Patient c/o SOB when walking. Reports ran out of medication, went to [facility] for refill and was told to come to the ER to have it refilled. Hx of asthma and COPD.      This 78 y.o male, with a medical history of Prostate atrophy, presents to the ED c/o chronic shortness of breath. Pt reports that he was seen in this ED recently for shortness of breath and was prescribed 2 inhalers as well as 2 medications. He states that he subsequently experienced improvement with the medications, but has since run out. He notes that he visited the pharmacy to refill the medications, but was told he needed to return to the ED to receive a refill. Pt reports that the shortness of breath has returned and he is concerned as he is exerting himself a lot more lately as he is currently in the process of moving. Pt does not have a PCP currently. He notes that he feels fine otherwise. He denies fever, chest pain, or any other associated symptoms.     The history is provided by the patient.   Review of patient's allergies indicates:   Allergen Reactions    Penicillins Hives     Past Medical History:   Diagnosis Date    Prostate atrophy      History reviewed. No pertinent surgical history.  History reviewed. No pertinent family history.  Social History     Tobacco Use    Smoking status: Some Days     Types: Cigarettes   Substance Use Topics    Alcohol use: Not Currently     Alcohol/week: 1.0 standard drink     Types: 1 Cans of beer per week     Comment: 1/31/23: Denies    Drug use: No     Review of Systems   Constitutional:  Negative for fever.   HENT:  Negative for sore throat.    Eyes:  Negative for visual disturbance.   Respiratory:   Positive for shortness of breath.    Cardiovascular:  Negative for chest pain.   Gastrointestinal:  Negative for nausea.   Genitourinary:  Negative for dysuria.   Musculoskeletal:  Negative for back pain.   Skin:  Negative for rash.   Neurological:  Negative for weakness.     Physical Exam     Initial Vitals [05/05/23 1120]   BP Pulse Resp Temp SpO2   121/60 70 18 98.1 °F (36.7 °C) 98 %      MAP       --         Physical Exam    Nursing note and vitals reviewed.  Constitutional: He is not diaphoretic. No distress.   HENT:   Head: Normocephalic and atraumatic.   Protecting airway   Eyes: Conjunctivae and EOM are normal. No scleral icterus.   Neck: Neck supple. No tracheal deviation present.   Normal range of motion.  Cardiovascular:  Normal rate, regular rhythm and intact distal pulses.           Pulmonary/Chest: No stridor. No respiratory distress.   Speaking in full sentences   Abdominal: Abdomen is soft. He exhibits no distension. There is no abdominal tenderness.   Musculoskeletal:         General: No tenderness or edema.      Cervical back: Normal range of motion and neck supple.     Neurological: He is alert. He has normal strength. No cranial nerve deficit or sensory deficit.   Skin: Skin is warm and dry.   Psychiatric: He has a normal mood and affect.       ED Course   Procedures  Labs Reviewed - No data to display       Imaging Results    None          Medications - No data to display  Medical Decision Making:   History:   Old Medical Records: I decided to obtain old medical records.  Old Records Summarized: other records.       <> Summary of Records: Patient was last seen in this ED on 4/15/23, for COPD exacerbation.  Differential Diagnosis:   Differential diagnosis includes medication refill, COPD and CHF.  ED Management:  Patient is afebrile and in no acute distress at time history and physical.  He is not hypoxic, tachypneic or in respiratory distress.  Lungs are clear to auscultation without wheezing.   Patient reports that he is in the emergency department because he would like a another course of steroids as it significantly improved his COPD symptoms.  I discussed extensively with patient the purpose of burst steroids as well as the risks associated with extended use.  Given patient's reported more frequent need to use his albuterol inhaler and significant improvement with steroids have prescribed patient a prednisone taper.  I discussed extensively with patient the importance of establishing primary care.  Patient referred to primary care and pulmonology for follow-up.  He appears stable for discharge and does not appear to have impending respiratory failure.  Patient expresses comfort with discharge. counseled on supportive care, appropriate medication usage, concerning symptoms for which to return to ER and the importance of follow up. Understanding and agreement with treatment plan was expressed.   This chart was completed using dictation software, as a result there may be some transcription errors.           Scribe Attestation:   Scribe #1: I performed the above scribed service and the documentation accurately describes the services I performed. I attest to the accuracy of the note.                 I, Amari Palomo , personally performed the services described in this documentation. All medical record entries made by the scribe were at my direction and in my presence. I have reviewed the chart and agree that the record reflects my personal performance and is accurate and complete.   Clinical Impression:   Final diagnoses:  [J44.9] Chronic obstructive pulmonary disease, unspecified COPD type (Primary)        ED Disposition Condition    Discharge Stable          ED Prescriptions       Medication Sig Dispense Start Date End Date Auth. Provider    predniSONE (DELTASONE) 10 MG tablet Take 1 tablet (10 mg total) by mouth once daily. Take 4 tabs x 3 days, then take 2 tabs x 3 days, then take 1 tab x 3 days. 21  tablet 5/5/2023 -- Amari Palomo MD          Follow-up Information       Follow up With Specialties Details Why Contact Info Additional Information    [facility] Family Medicine Schedule an appointment as soon as possible for a visit   [address] [facility] [id number] [phone] [address]nd Floor    [facility] PULMONARY MEDICINE Pulmonology Schedule an appointment as soon as possible for a visit   [address] [facility] [address]  [phone]     [facility]  Schedule an appointment as soon as possible for a visit   [address] [place] [address]  [phone]                Amari Palomo MD  05/05/23 0731

## 2023-05-08 PROBLEM — I63.511 ACUTE RIGHT MCA STROKE: Status: RESOLVED | Noted: 2023-02-06 | Resolved: 2023-05-08

## 2023-05-08 PROBLEM — N39.0 URINARY TRACT INFECTION WITH HEMATURIA: Status: RESOLVED | Noted: 2023-02-02 | Resolved: 2023-05-08

## 2023-05-08 PROBLEM — R31.9 URINARY TRACT INFECTION WITH HEMATURIA: Status: RESOLVED | Noted: 2023-02-02 | Resolved: 2023-05-08

## 2023-05-15 PROBLEM — I63.411 EMBOLIC STROKE INVOLVING RIGHT MIDDLE CEREBRAL ARTERY: Status: RESOLVED | Noted: 2023-01-31 | Resolved: 2023-05-15

## 2023-10-30 ENCOUNTER — HOSPITAL ENCOUNTER (EMERGENCY)
Facility: HOSPITAL | Age: 78
Discharge: HOME | End: 2023-10-30
Attending: INTERNAL MEDICINE
Payer: MEDICARE

## 2023-10-30 VITALS
RESPIRATION RATE: 20 BRPM | HEART RATE: 94 BPM | HEIGHT: 73 IN | SYSTOLIC BLOOD PRESSURE: 139 MMHG | OXYGEN SATURATION: 93 % | WEIGHT: 122 LBS | DIASTOLIC BLOOD PRESSURE: 67 MMHG | BODY MASS INDEX: 16.17 KG/M2 | TEMPERATURE: 98 F

## 2023-10-30 DIAGNOSIS — R06.02 SOB (SHORTNESS OF BREATH): Primary | ICD-10-CM

## 2023-10-30 PROCEDURE — 94640 AIRWAY INHALATION TREATMENT: CPT | Mod: XB

## 2023-10-30 PROCEDURE — 99285 EMERGENCY DEPT VISIT HI MDM: CPT | Mod: 25

## 2023-10-30 PROCEDURE — 94761 N-INVAS EAR/PLS OXIMETRY MLT: CPT

## 2023-10-30 PROCEDURE — 93010 EKG 12-LEAD: ICD-10-PCS | Mod: ,,, | Performed by: INTERNAL MEDICINE

## 2023-10-30 PROCEDURE — 93005 ELECTROCARDIOGRAM TRACING: CPT

## 2023-10-30 PROCEDURE — 25000242 PHARM REV CODE 250 ALT 637 W/ HCPCS: Performed by: PHYSICIAN ASSISTANT

## 2023-10-30 PROCEDURE — 93010 ELECTROCARDIOGRAM REPORT: CPT | Mod: ,,, | Performed by: INTERNAL MEDICINE

## 2023-10-30 RX ORDER — BUDESONIDE AND FORMOTEROL FUMARATE DIHYDRATE 160; 4.5 UG/1; UG/1
2 AEROSOL RESPIRATORY (INHALATION) 2 TIMES DAILY
Qty: 6 G | Refills: 1 | Status: SHIPPED | OUTPATIENT
Start: 2023-10-30

## 2023-10-30 RX ORDER — IPRATROPIUM BROMIDE AND ALBUTEROL SULFATE 2.5; .5 MG/3ML; MG/3ML
3 SOLUTION RESPIRATORY (INHALATION)
Status: COMPLETED | OUTPATIENT
Start: 2023-10-30 | End: 2023-10-30

## 2023-10-30 RX ORDER — DOXYCYCLINE 100 MG/1
100 CAPSULE ORAL 2 TIMES DAILY
Qty: 10 CAPSULE | Refills: 0 | Status: SHIPPED | OUTPATIENT
Start: 2023-10-30 | End: 2023-11-04

## 2023-10-30 RX ORDER — PREDNISONE 20 MG/1
TABLET ORAL
Qty: 7 TABLET | Refills: 0 | Status: SHIPPED | OUTPATIENT
Start: 2023-10-30 | End: 2023-11-05

## 2023-10-30 RX ADMIN — IPRATROPIUM BROMIDE AND ALBUTEROL SULFATE 3 ML: .5; 3 SOLUTION RESPIRATORY (INHALATION) at 04:10

## 2023-10-30 NOTE — ED PROVIDER NOTES
Encounter Date: 10/30/2023       History     Chief Complaint   Patient presents with    Shortness of Breath     Pt arrived via ems, pt chief complaint is Shortness of breath. Pt states has been SOB all night  tried to self medicate with inhalers, pt did receive breathing treatment enroute to hospital and states feels better at this time.      77yo M smoker presents to ED via EMS with acute onset cough, SOB, wheeze, anxiety.    Patient states began acutely with cough, wheezing, shortness of breath around 11:30 p.m. this evening.  He denies any recent illness.  No fever.  No known sick contacts. Patient admits to history of similar symptoms related to his COPD.  He ran out of his Symbicort 4 days ago.  He states used his nebulizer with mild relief.  States he was concerned about some mold in his room, therefore he walks outside in use his rescue albuterol inhaler a few times, with some improvement of his shortness of breath and wheezing.  He began to feel anxious due to shortness of breath, he contacted EMS.  He was given 1 DuoNeb EN route along with 16mo PO Decadron (out of Solumedrol on EMS truck).  Patient states he feels better upon arrival to the ED. EMS states that there was no hypoxia at any time.    Patient denies associated chest pain or chest tightness.  Denies lightheadedness, nausea vomiting, diaphoresis, syncope or near-syncope.  Denies history of ACS.  Denies any new leg swelling or calf pain.  Denies history of CHF.  No other complaints.    PMH:  Prostate atrophy  BPH  COPD  HTN  CVA--residual L hand weakness/numbness      TTE 2/1/23:  The left ventricle is normal in size with concentric remodeling and normal systolic function.  The estimated ejection fraction is 68%.  Normal left ventricular diastolic function.  Normal right ventricular size with normal right ventricular systolic function.  Mechanically ventilated; cannot use inferior caval vein diameter to estimate central venous pressure.      Review  of patient's allergies indicates:   Allergen Reactions    Penicillins Hives     Past Medical History:   Diagnosis Date    Prostate atrophy      No past surgical history on file.  No family history on file.  Social History     Tobacco Use    Smoking status: Some Days     Types: Cigarettes   Substance Use Topics    Alcohol use: Not Currently     Alcohol/week: 1.0 standard drink of alcohol     Types: 1 Cans of beer per week     Comment: 1/31/23: Denies    Drug use: No     Review of Systems   Constitutional:  Negative for chills, diaphoresis and fever.   Respiratory:  Positive for shortness of breath and wheezing. Negative for chest tightness.    Cardiovascular:  Negative for chest pain, palpitations and leg swelling.   Gastrointestinal:  Negative for nausea and vomiting.   Musculoskeletal:  Negative for myalgias.   Neurological:  Negative for syncope.   Psychiatric/Behavioral:  The patient is nervous/anxious.        Physical Exam     Initial Vitals [10/30/23 0315]   BP Pulse Resp Temp SpO2   (!) 173/73 80 20 98.1 °F (36.7 °C) 99 %      MAP       --         Physical Exam    Nursing note and vitals reviewed.  Constitutional: He appears well-developed. He is not diaphoretic. No distress.   Chronically thin appearing elderly male sitting upright in bed.  Speaking in full sentences without pause or difficulty.   HENT:   Head: Normocephalic and atraumatic.   Eyes:   Arcus senilis OU   Neck: Neck supple.   Normal range of motion.  Cardiovascular:  Intact distal pulses.           Normal sinus rhythm.  No unilateral leg swelling or calf tenderness.  No pretibial edema.   Pulmonary/Chest: No respiratory distress.   Mild tachypnea.  Expiratory wheeze throughout both lung zones.  No hypoxia on room air, no accessory muscle use.   Musculoskeletal:         General: No tenderness. Normal range of motion.      Cervical back: Normal range of motion and neck supple.     Neurological: He is alert and oriented to person, place, and time.    Skin: Skin is warm. Capillary refill takes less than 2 seconds.   Psychiatric: He has a normal mood and affect. Thought content normal.         ED Course   Procedures  Labs Reviewed - No data to display  EKG Readings: (Independently Interpreted)   Normal sinus rhythm vital ventricular rate 81 beats per minute.  Normal ME, normal QT, normal QRS duration.  No right axis deviation.  No ST elevation.  Inverted T-wave aVL.  Questionable U-waves.  EKG does appear grossly similar previous dated 04/15/2023.       Imaging Results              X-Ray Chest 1 View (Final result)  Result time 10/30/23 03:56:59      Final result by Laurie Gao MD (10/30/23 03:56:59)                   Impression:      Please see above.      Electronically signed by: Laurie Gao MD  Date:    10/30/2023  Time:    03:56               Narrative:    EXAMINATION:  XR CHEST 1 VIEW    CLINICAL HISTORY:  Shortness of breath    TECHNIQUE:  Single frontal view of the chest was performed.    COMPARISON:  04/15/2023    FINDINGS:  Cardiac monitoring leads overlie the chest.  The cardiomediastinal silhouette is unchanged in size and configuration noting atherosclerosis of the thoracic aorta.  Mediastinal structures are midline.  The lungs are hyperinflated.  There is diffuse coarse interstitial lung markings, similar to prior examination and suggestive of emphysema/COPD associated scarring and/or fibrosis.  Note is made of persistent presumed scarring and calcification in the bilateral lung apices.  No new confluent airspace consolidation, substantial volume of pleural fluid or pneumothorax identified.  Osseous structures appear intact with degenerative change.                                    X-Rays:   Independently Interpreted Readings:   Chest X-Ray: Personal interpretation:  Very mild cardiomegaly, no effusion, no dense consolidation, interstitial markings appear grossly similar previous chest x-ray without any definitive peripheral dense  consolidation.  No widened mediastinum.  No convincing pneumothorax.     Medications   albuterol-ipratropium 2.5 mg-0.5 mg/3 mL nebulizer solution 3 mL (has no administration in time range)     Medical Decision Making  Differential diagnosis:  ACS, PE, COPD, CHF, viral URI, pneumonia, bronchitis    Amount and/or Complexity of Data Reviewed  External Data Reviewed: labs, radiology, ECG and notes.  Radiology: ordered and independent interpretation performed. Decision-making details documented in ED Course.  ECG/medicine tests: ordered and independent interpretation performed. Decision-making details documented in ED Course.  Discussion of management or test interpretation with external provider(s): History of COPD, similar ED visits in the past.  No personal history of ACS.  EKG reassuring, stable compared to previous.  No evidence of volume overload on exam or imaging.  No history of CHF, reassuring TTE earlier this year.  Ran out of his Symbicort 4 days ago.  Expiratory wheeze to bilateral lung zones, no hypoxia at any time.  Tachypnea, breath sounds improved following neb treatments in the ED. At this time, despite comorbidities I think unlikely acute coronary syndrome.  Plan to discharge with a few days of systemic/burst corticosteroids, refill his Symbicort, advised follow-up with primary care provider or pulmonologist for re-evaluation of any persistent symptoms.  Patient feels comfortable with current plan.    Risk  Prescription drug management.               ED Course as of 10/30/23 0422   Mon Oct 30, 2023   0417 Patient feels better on re-evaluation.  Breath sounds improved.  Without any hypoxia throughout ED stay.  No longer with any tachypnea.  After discussion with patient, he does feel comfortable with discharge and outpatient follow-up.  Strongly advised contacting his PCP or his pulmonologist for re-evaluation of recent COPD exacerbation.  May be related to running out of Symbicort recently.  Refill  ordered.  Advised to return if he experiences worsening shortness of breath despite current treatment plan, if you develop chest pain, if you begins to feel lightheaded or feel as if you are going to pass out, if any other problems occur. [SM]      ED Course User Index  [SM] Prem Marc PA-C                    Clinical Impression:   Final diagnoses:  [R06.02] SOB (shortness of breath) (Primary)        ED Disposition Condition    Discharge Stable          ED Prescriptions       Medication Sig Dispense Start Date End Date Auth. Provider    SYMBICORT 160-4.5 mcg/actuation HFAA Inhale 2 puffs into the lungs 2 (two) times a day. 6 g 10/30/2023 -- Prem Marc PA-C    predniSONE (DELTASONE) 20 MG tablet Take 2 tablets (40 mg total) by mouth once daily for 2 days, THEN 1 tablet (20 mg total) once daily for 2 days, THEN 0.5 tablets (10 mg total) once daily for 2 days. 7 tablet 10/30/2023 11/5/2023 Prem Marc PA-C    doxycycline (VIBRAMYCIN) 100 MG Cap Take 1 capsule (100 mg total) by mouth 2 (two) times daily. for 5 days 10 capsule 10/30/2023 11/4/2023 Prem Marc PA-C          Follow-up Information       Follow up With Specialties Details Why Contact Info    Mckenna Garcia MD Pulmonary Disease Schedule an appointment as soon as possible for a visit  For reevaluation [address] [place] [address]  [id number]  [facility] [address]  [phone]      Scott Guerra Jr., MD Hospitalist, Family Medicine Schedule an appointment as soon as possible for a visit  For reevaluation [address]  [address]  [facility] [address]  [phone]               Prem Marc PA-C  10/30/23 0420

## 2023-10-30 NOTE — ED NOTES
Pt BIB [facility] c/o gradual onset of SOB. Pt has Hx asthma, cigarette smoking and no prior intubations. Pt's home rescue inhaler was not helping. Pt was given neb treatment PTA and reports feeling better. Pt A/O x 4 w/ ABCs intact, NAD. VSS. Pt denies CP, ABD pain, weakness, dizziness, vision problems or any other complaints.     Review of patient's allergies indicates:   Allergen Reactions    Penicillins Hives        Patient has verified the spelling of their name and  on armband.   APPEARANCE: Patient is alert, calm, oriented x 4, and does not appear distressed.  SKIN: Skin is normal for race, warm, and dry. Normal skin turgor and mucous membranes moist.  CARDIAC: Normal rate and rhythm, no murmur heard.   RESPIRATORY:Normal rate and effort. Exp wheezing. Respirations are equal and unlabored.    GASTRO: Bowel sounds normal, abdomen is soft, no tenderness, and no abdominal distention.  MUSCLE: Full ROM. No bony tenderness or soft tissue tenderness. No obvious deformity.  PERIPHERAL VASCULAR: peripheral pulses present. Normal cap refill. No edema. Warm to touch.  NEURO: 5/5 strength major flexors/extensors bilaterally. Sensory intact to light touch bilaterally. Daniel coma scale: eyes open spontaneously-4, oriented & converses-5, obeys commands-6. No neurological abnormalities.   MENTAL STATUS: awake, alert and aware of environment.  : Voids without complication    ED orders in progress. Pt SR up x 2. Bed in lowest position with wheels locked. Call bell within reach of pt.

## 2023-10-30 NOTE — DISCHARGE INSTRUCTIONS
Contact your primary care provider or your pulmonologist for follow-up and re-evaluation.    Continue Symbicort twice daily.  Take the prednisone as prescribed.  Doxycycline twice daily for the next 5 days continue using your nebulizer or rescue inhaler as needed for continued shortness of breath, wheezing.    Please contact EMS and return to this ED if you develop chest pain, if worsening shortness of breath despite treatment, if you develop fever, if you begin to feel lightheaded or feel as if you are going to pass out, if any other problems occur.

## 2024-02-15 ENCOUNTER — HOSPITAL ENCOUNTER (EMERGENCY)
Facility: HOSPITAL | Age: 79
Discharge: HOME | End: 2024-02-15
Attending: EMERGENCY MEDICINE
Payer: MEDICARE

## 2024-02-15 VITALS
TEMPERATURE: 99 F | HEART RATE: 66 BPM | HEIGHT: 73 IN | SYSTOLIC BLOOD PRESSURE: 160 MMHG | WEIGHT: 127 LBS | RESPIRATION RATE: 26 BRPM | BODY MASS INDEX: 16.83 KG/M2 | OXYGEN SATURATION: 96 % | DIASTOLIC BLOOD PRESSURE: 73 MMHG

## 2024-02-15 DIAGNOSIS — R10.9 LEFT FLANK PAIN: Primary | ICD-10-CM

## 2024-02-15 LAB
BILIRUB UR QL STRIP: NEGATIVE
CLARITY UR: CLEAR
COLOR UR: YELLOW
GLUCOSE UR QL STRIP: NEGATIVE
HGB UR QL STRIP: NEGATIVE
KETONES UR QL STRIP: NEGATIVE
LEUKOCYTE ESTERASE UR QL STRIP: NEGATIVE
NITRITE UR QL STRIP: NEGATIVE
PH UR STRIP: 8 [PH] (ref 5–8)
PROT UR QL STRIP: NEGATIVE
SP GR UR STRIP: 1.01 (ref 1–1.03)
URN SPEC COLLECT METH UR: NORMAL
UROBILINOGEN UR STRIP-ACNC: NEGATIVE EU/DL

## 2024-02-15 PROCEDURE — 99284 EMERGENCY DEPT VISIT MOD MDM: CPT

## 2024-02-15 PROCEDURE — 25000003 PHARM REV CODE 250: Performed by: PHYSICIAN ASSISTANT

## 2024-02-15 PROCEDURE — 81003 URINALYSIS AUTO W/O SCOPE: CPT | Performed by: EMERGENCY MEDICINE

## 2024-02-15 RX ORDER — ACETAMINOPHEN 325 MG/1
650 TABLET ORAL EVERY 6 HOURS PRN
Qty: 12 TABLET | Refills: 0 | Status: SHIPPED | OUTPATIENT
Start: 2024-02-15 | End: 2024-02-18

## 2024-02-15 RX ORDER — ACETAMINOPHEN 325 MG/1
650 TABLET ORAL
Status: COMPLETED | OUTPATIENT
Start: 2024-02-15 | End: 2024-02-15

## 2024-02-15 RX ORDER — METHOCARBAMOL 500 MG/1
1000 TABLET, FILM COATED ORAL 3 TIMES DAILY
Qty: 12 TABLET | Refills: 0 | Status: SHIPPED | OUTPATIENT
Start: 2024-02-15 | End: 2024-02-17

## 2024-02-15 RX ORDER — LIDOCAINE 50 MG/G
1 PATCH TOPICAL DAILY
Qty: 6 PATCH | Refills: 0 | Status: SHIPPED | OUTPATIENT
Start: 2024-02-15

## 2024-02-15 RX ADMIN — ACETAMINOPHEN 650 MG: 325 TABLET ORAL at 09:02

## 2024-02-15 NOTE — ED PROVIDER NOTES
Encounter Date: 2/15/2024    SCRIBE #1 NOTE: I, Yari Sosa, am scribing for, and in the presence of,  Elpidio Evans PA-C. I have scribed the following portions of the note - Other sections scribed: HPI, ROS.       History     Chief Complaint   Patient presents with    Flank Pain     Pt reports left side/flank pain since yesterday morning, worse with movement. Pt denies dysuria or hematuria. Pt denies fevers.      This 78 y.o male, with a medical history of Prostate atrophy, presents to the ED c/o left sided flank pain that began yesterday morning. Pt reports that the pain is exacerbated with positional changes. The symptoms are acute, constant and moderate (6/10). He states that he normally drinks 3x 2 liter cold drinks per day and does not drink much water at all. He notes, however, since the onset of pain he has increased his water intake and has noticed some improvement in pain. No history of kidney stones. He denies hematuria, urine issues, nausea, emesis or fever. No other associated symptoms.     The history is provided by the patient.     Review of patient's allergies indicates:   Allergen Reactions    Penicillins Hives     Past Medical History:   Diagnosis Date    Prostate atrophy      No past surgical history on file.  No family history on file.  Social History     Tobacco Use    Smoking status: Some Days     Types: Cigarettes   Substance Use Topics    Alcohol use: Not Currently     Alcohol/week: 1.0 standard drink of alcohol     Types: 1 Cans of beer per week     Comment: 1/31/23: Denies    Drug use: No     Review of Systems   Constitutional:  Negative for fever.   HENT:  Negative for congestion, sore throat and trouble swallowing.    Eyes:  Negative for redness.   Respiratory:  Negative for cough and shortness of breath.    Cardiovascular:  Negative for chest pain.   Gastrointestinal:  Negative for abdominal pain, constipation, diarrhea, nausea and vomiting.   Genitourinary:  Positive for flank  pain (left sided). Negative for dysuria, frequency, hematuria and urgency.   Musculoskeletal:  Negative for back pain.   Skin:  Negative for rash.   Neurological:  Negative for headaches.   All other systems reviewed and are negative.      Physical Exam     Initial Vitals [02/15/24 0828]   BP Pulse Resp Temp SpO2   (!) 166/70 86 18 97.8 °F (36.6 °C) 95 %      MAP       --         Physical Exam    Nursing note and vitals reviewed.  Constitutional: He appears well-developed and well-nourished. He is not diaphoretic. No distress.   HENT:   Head: Atraumatic.   Right Ear: External ear normal.   Left Ear: External ear normal.   Eyes: Conjunctivae are normal.   Neck: No tracheal deviation present.   Normal range of motion.  Cardiovascular:  Normal rate and regular rhythm.           Pulmonary/Chest: No accessory muscle usage or stridor. No tachypnea. No respiratory distress.   Abdominal: Abdomen is soft. He exhibits no distension. There is no abdominal tenderness. There is no guarding.   Musculoskeletal:      Cervical back: Normal range of motion.      Comments: Very reproducible and positional tenderness to the left lower back and left flank intercostal musculature without bony tenderness of the hips.  Midline tenderness of spine.  Or overlying skin changes.  Ambulatory.     Neurological: He has normal strength. He displays no tremor. He displays no seizure activity. Coordination and gait normal.   Skin: Skin is intact. Capillary refill takes less than 2 seconds. No cyanosis.         ED Course   Procedures  Labs Reviewed   URINALYSIS, REFLEX TO URINE CULTURE    Narrative:     Specimen Source->Urine          Imaging Results    None          Medications   acetaminophen tablet 650 mg (650 mg Oral Given 2/15/24 0928)     Medical Decision Making  Symptoms mostly seem musculoskeletal in nature and have already improved with hydration prior to arrival.  Recommending renal function assessment; patient would prefer to avoid blood  work.  Patient understands that he is significant proteinuria he will require labs regardless.     UA shows no hematuria or proteinuria.  Feeling much better after Tylenol in the ED.  Remains overall well-appearing.  No hemodynamic instability.  No overlying shingles or cellulitis.  Atraumatic.  Low risk for aortic dissection, lower lobe pneumonia, pulmonary embolism, ureteral stone, and UTI today.  Reassurance.    Risk  OTC drugs.  Prescription drug management.            Scribe Attestation:   Scribe #1: I performed the above scribed service and the documentation accurately describes the services I performed. I attest to the accuracy of the note.                         I, Elpidio Evans PA-C, personally performed the services described in this documentation. All medical record entries made by the scribe were at my direction and in my presence. I have reviewed the chart and agree that the record reflects my personal performance and is accurate and complete.       Clinical Impression:  Final diagnoses:  [R10.9] Left flank pain (Primary)          ED Disposition Condition    Discharge Stable          ED Prescriptions       Medication Sig Dispense Start Date End Date Auth. Provider    acetaminophen (TYLENOL) 325 MG tablet Take 2 tablets (650 mg total) by mouth every 6 (six) hours as needed for Pain. 12 tablet 2/15/2024 2/18/2024 Elpidio Evans PA-C    methocarbamoL (ROBAXIN) 500 MG Tab Take 2 tablets (1,000 mg total) by mouth 3 (three) times daily. for 2 days 12 tablet 2/15/2024 2/17/2024 Elpidio Evans PA-C    LIDOcaine (LIDODERM) 5 % Place 1 patch onto the skin once daily. Remove & Discard patch within 12 hours or as directed by MD. May use 4% formulation if more affordable for patient. 6 patch 2/15/2024 -- Elpidio Evans PA-C          Follow-up Information       Follow up With Specialties Details Why Contact Info    Scott Guerra Jr., MD Hospitalist, Family Medicine Schedule an appointment as soon as  possible for a visit in 1 day For re-evaluation [address]  [address]  [place] [address]  [phone]      [facility] - Emergency Dept Emergency Medicine Go to  If symptoms worsen or new symptoms develop [address] [facility] [place] [address]  [phone]             Elpidio Evans PA-C  02/15/24 1332

## 2024-02-15 NOTE — DISCHARGE INSTRUCTIONS
Thank you for coming to our Emergency Department today. It is important to remember that some problems or medical conditions are difficult to diagnose and may not be found or addressed during your Emergency Department visit.  These conditions often start with non-specific symptoms and can only be diagnosed on follow up visits with your primary care physician or specialist when the symptoms continue or change. Please remember that all medical conditions can change, and we cannot predict how you will be feeling tomorrow or the next day. Return to the ER with any questions/concerns, new/concerning symptoms, worsening or failure to improve.       Be sure to follow up with your primary care doctor and review all labs/imaging/tests that were performed during your ER visit with them. It is very common for us to identify non-emergent incidental findings which must be followed up with your primary care physician.  Some labs/imaging/tests may be outside of the normal range, and require non-emergent follow-up and/or further investigation/treatment/procedures/testing to help diagnose/exclude/prevent complications or other potentially serious medical conditions. Some abnormalities may not have been discussed or addressed during your ER visit.     An ER visit does not replace a primary care visit, and many screening tests or follow-up tests cannot be ordered by an ER doctor or performed by the ER. Some tests may even require pre-approval.    If you do not have a primary care doctor, you may contact the one listed on your discharge paperwork or you may also call the [facility] Appointment Desk at [phone] , or [address] [place] [address] at  [phone] to schedule an appointment, or establish care with a primary care doctor or even a specialist and to obtain information about local resources. It is important to your health that you have a primary care doctor.    Please take all medications as directed. We have done our best to select  a medication for you that will treat your condition however, all medications may potentially have side-effects and it is impossible to predict which medications may give you side-effects or what those side-effects (if any) those medications may give you.  If you feel that you are having a negative effect or side-effect of any medication you should stop taking those medications immediately and seek medical attention. If you feel that you are having a life-threatening reaction call 911.        Do not drive, swim, climb to height, take a bath, operate heavy machinery, drink alcohol or take potentially sedating medications, sign any legal documents or make any important decisions for 24 hours if you have received any pain medications, sedatives or mood altering drugs during your ER visit or within 24 hours of taking them if they have been prescribed to you.     You can find additional resources for Dentists, hearing aids, durable medical equipment, low cost pharmacies and other resources at [email or web address]

## 2024-05-21 NOTE — PLAN OF CARE
02/08/23 1123   Post-Acute Status   Post-Acute Authorization Other   Post-Acute Placement Status Pending medical clearance/testing   Discharge Plan   Discharge Plan A Rehab   Discharge Plan B Skilled Nursing Facility        Bri HOOKS Young is a 75 year old female presents today for   Chief Complaint   Patient presents with    Cough     Presents with complaints of cough and cold for the past 10 days.  States she just cannot seem to get rid of the symptoms.    Denies any fevers. Highest temp was 99.7.    TX: Coricidin.    She declines any viral testing today.    ALLERGIES:   Allergen Reactions    Kdc:Green Tea Leaf Ext+Red Dye+Caffeine+Folic Acid+Lecithin+... ANAPHYLAXIS    Adhesive   (Environmental) PRURITUS     If left on for overnight    Ciprofloxacin      Heart races      Sulfa Antibiotics PRURITUS       /85   Pulse 93   Temp 97.5 °F (36.4 °C) (Temporal)   Resp 16   Wt 79.6 kg (175 lb 6.4 oz)   Ob/Gyn Status: Hysterectomy  Medications: medications verified, no change          PCP: Kaylyn Jacinto MD    Patient here alone    Patient would like communication of their results via:    Cell Phone:   Telephone Information:   Mobile [phone]     Okay to leave a message containing results? Yes

## 2024-08-28 NOTE — PROGRESS NOTES
Subjective:       Patient ID: Gabriel Springer is a 79 y.o. male who was referred by Self, Aaareferral    Chief Complaint:   Chief Complaint   Patient presents with    Benign Prostatic Hypertrophy       Erectile Dysfunction  Patient complains of erectile dysfunction. Onset of dysfunction was several years ago and was gradual in onset.  Patient states the nature of difficulty is both attaining and maintaining erection. Full erections occur never. Partial erections occur with masturbation. Libido is not affected. Risk factors for ED include cardiovascular disease. Patient denies history of pelvic radiation. Patient's expectations as to sexual function good.  Patient's description of relationship with partner good. Previous treatment of ED includes Sildenafil.    ACTIVE MEDICAL ISSUES:  Patient Active Problem List   Diagnosis    Tobacco abuse    Asthma    Prostate atrophy    Cytotoxic brain edema    Impaired activities of daily living    Essential hypertension    Encephalopathy    Seizure    Urinary retention       PAST MEDICAL HISTORY  Past Medical History:   Diagnosis Date    Prostate atrophy        PAST SURGICAL HISTORY:  History reviewed. No pertinent surgical history.    SOCIAL HISTORY:  Social History     Tobacco Use    Smoking status: Some Days     Types: Cigarettes   Substance Use Topics    Alcohol use: Not Currently     Alcohol/week: 1.0 standard drink of alcohol     Types: 1 Cans of beer per week     Comment: 1/31/23: Denies    Drug use: No       FAMILY HISTORY:  No family history on file.    ALLERGIES AND MEDICATIONS: updated and reviewed.  Review of patient's allergies indicates:   Allergen Reactions    Penicillins Hives     Current Outpatient Medications   Medication Sig    LIDOcaine (LIDODERM) 5 % Place 1 patch onto the skin once daily. Remove & Discard patch within 12 hours or as directed by MD. May use 4% formulation if more affordable for patient.    montelukast (SINGULAIR) 10 mg tablet Take 10 mg by mouth  once daily.    SYMBICORT 160-4.5 mcg/actuation HFAA Inhale 2 puffs into the lungs 2 (two) times a day.    tamsulosin (FLOMAX) 0.4 mg Cap Take 0.4 mg by mouth once daily.    UNABLE TO FIND Take 2.5 mg by mouth nightly. medication name:  sleep aid (unknown name)    albuterol (PROVENTIL/VENTOLIN HFA) 90 mcg/actuation inhaler Inhale 2 puffs into the lungs every 6 (six) hours as needed for Shortness of Breath or Wheezing (cough and shortness of breath). Rescue    amLODIPine (NORVASC) 10 MG tablet Take 1 tablet (10 mg total) by mouth once daily.    aspirin 81 MG Chew Take 1 tablet (81 mg total) by mouth once daily.    atorvastatin (LIPITOR) 40 MG tablet Take 1 tablet (40 mg total) by mouth once daily.    clopidogreL (PLAVIX) 75 mg tablet Take 1 tablet (75 mg total) by mouth once daily.    VIAGRA 100 mg tablet Take 1 tablet (100 mg total) by mouth daily as needed for Erectile Dysfunction.     No current facility-administered medications for this visit.       Review of Systems   Constitutional:  Negative for chills and fever.   HENT:  Negative for congestion.    Respiratory:  Negative for chest tightness and shortness of breath.    Cardiovascular:  Negative for chest pain and palpitations.   Gastrointestinal:  Negative for abdominal pain, constipation, diarrhea, nausea and vomiting.   Genitourinary:  Negative for difficulty urinating, dysuria, flank pain, hematuria and urgency.   Musculoskeletal:  Negative for arthralgias.   Neurological:  Negative for dizziness.   Psychiatric/Behavioral:  Negative for confusion.        Objective:      Vitals:    08/29/24 0955   Weight: 48.9 kg (107 lb 12.9 oz)     Physical Exam  Vitals and nursing note reviewed.   Constitutional:       Appearance: He is well-developed.   HENT:      Head: Normocephalic.   Eyes:      Conjunctiva/sclera: Conjunctivae normal.   Neck:      Thyroid: No thyromegaly.      Trachea: No tracheal deviation.   Cardiovascular:      Rate and Rhythm: Normal rate.       Heart sounds: Normal heart sounds.   Pulmonary:      Effort: Pulmonary effort is normal. No respiratory distress.      Breath sounds: Normal breath sounds. No wheezing.   Abdominal:      General: Bowel sounds are normal.      Palpations: Abdomen is soft.      Tenderness: There is no abdominal tenderness. There is no rebound.      Hernia: No hernia is present.   Musculoskeletal:         General: No tenderness. Normal range of motion.      Cervical back: Normal range of motion and neck supple.   Lymphadenopathy:      Cervical: No cervical adenopathy.   Skin:     General: Skin is warm and dry.      Findings: No erythema or rash.   Neurological:      Mental Status: He is alert and oriented to person, place, and time.   Psychiatric:         Behavior: Behavior normal.         Thought Content: Thought content normal.         Judgment: Judgment normal.         Urine dipstick shows negative for all components.  Micro exam: negative for WBC's or RBC's.    Assessment:       1. Other male erectile dysfunction    2. BPH with obstruction/lower urinary tract symptoms    3. Nocturia more than twice per night          Plan:       1. Other male erectile dysfunction    - VIAGRA 100 mg tablet; Take 1 tablet (100 mg total) by mouth daily as needed for Erectile Dysfunction.  Dispense: 3 tablet; Refill: 5  - Testosterone; Future    2. BPH with obstruction/lower urinary tract symptoms    - Prostate Specific Antigen, Diagnostic; Future    3. Nocturia more than twice per night              Follow up in about 4 weeks (around 9/26/2024) for Follow up Established, Review labs.

## 2024-08-29 ENCOUNTER — OFFICE VISIT (OUTPATIENT)
Dept: UROLOGY | Facility: CLINIC | Age: 79
End: 2024-08-29
Payer: MEDICARE

## 2024-08-29 VITALS — BODY MASS INDEX: 14.22 KG/M2 | WEIGHT: 107.81 LBS

## 2024-08-29 DIAGNOSIS — N40.1 BPH WITH OBSTRUCTION/LOWER URINARY TRACT SYMPTOMS: ICD-10-CM

## 2024-08-29 DIAGNOSIS — N13.8 BPH WITH OBSTRUCTION/LOWER URINARY TRACT SYMPTOMS: ICD-10-CM

## 2024-08-29 DIAGNOSIS — R35.1 NOCTURIA MORE THAN TWICE PER NIGHT: ICD-10-CM

## 2024-08-29 DIAGNOSIS — N52.8 OTHER MALE ERECTILE DYSFUNCTION: Primary | ICD-10-CM

## 2024-08-29 PROCEDURE — 3288F FALL RISK ASSESSMENT DOCD: CPT | Mod: CPTII,S$GLB,, | Performed by: UROLOGY

## 2024-08-29 PROCEDURE — 1160F RVW MEDS BY RX/DR IN RCRD: CPT | Mod: CPTII,S$GLB,, | Performed by: UROLOGY

## 2024-08-29 PROCEDURE — 99204 OFFICE O/P NEW MOD 45 MIN: CPT | Mod: S$GLB,,, | Performed by: UROLOGY

## 2024-08-29 PROCEDURE — 99999 PR PBB SHADOW E&M-EST. PATIENT-LVL III: CPT | Mod: PBBFAC,,, | Performed by: UROLOGY

## 2024-08-29 PROCEDURE — 1159F MED LIST DOCD IN RCRD: CPT | Mod: CPTII,S$GLB,, | Performed by: UROLOGY

## 2024-08-29 PROCEDURE — 1126F AMNT PAIN NOTED NONE PRSNT: CPT | Mod: CPTII,S$GLB,, | Performed by: UROLOGY

## 2024-08-29 PROCEDURE — 1101F PT FALLS ASSESS-DOCD LE1/YR: CPT | Mod: CPTII,S$GLB,, | Performed by: UROLOGY

## 2024-08-29 RX ORDER — SILDENAFIL CITRATE 100 MG/1
100 TABLET, FILM COATED ORAL DAILY PRN
Qty: 3 TABLET | Refills: 5 | Status: SHIPPED | OUTPATIENT
Start: 2024-08-29 | End: 2025-08-29

## 2024-10-02 ENCOUNTER — LAB VISIT (OUTPATIENT)
Dept: LAB | Facility: HOSPITAL | Age: 79
End: 2024-10-02
Attending: UROLOGY
Payer: MEDICARE

## 2024-10-02 DIAGNOSIS — N40.1 BPH WITH OBSTRUCTION/LOWER URINARY TRACT SYMPTOMS: ICD-10-CM

## 2024-10-02 DIAGNOSIS — N13.8 BPH WITH OBSTRUCTION/LOWER URINARY TRACT SYMPTOMS: ICD-10-CM

## 2024-10-02 DIAGNOSIS — N52.8 OTHER MALE ERECTILE DYSFUNCTION: ICD-10-CM

## 2024-10-02 LAB
COMPLEXED PSA SERPL-MCNC: 21.2 NG/ML (ref 0–4)
TESTOST SERPL-MCNC: 899 NG/DL (ref 304–1227)

## 2024-10-02 PROCEDURE — 36415 COLL VENOUS BLD VENIPUNCTURE: CPT | Performed by: UROLOGY

## 2024-10-02 PROCEDURE — 84153 ASSAY OF PSA TOTAL: CPT | Performed by: UROLOGY

## 2024-10-02 PROCEDURE — 84403 ASSAY OF TOTAL TESTOSTERONE: CPT | Performed by: UROLOGY

## 2024-10-17 ENCOUNTER — TELEPHONE (OUTPATIENT)
Dept: UROLOGY | Facility: CLINIC | Age: 79
End: 2024-10-17
Payer: MEDICARE

## 2024-10-17 NOTE — TELEPHONE ENCOUNTER
Pt was contacted to inform him of appt change to do provider not being in office. Was unable to lvm.

## 2024-10-18 ENCOUNTER — TELEPHONE (OUTPATIENT)
Dept: UROLOGY | Facility: CLINIC | Age: 79
End: 2024-10-18
Payer: MEDICARE

## 2024-10-18 NOTE — TELEPHONE ENCOUNTER
Transport was contacted. They informed me pt has  ride for 10/21 appt.  ----- Message from Tech Roro sent at 10/18/2024  8:45 AM CDT -----  Regarding: Patient call back  .Type: Patient Call Back    Who called:Gibran with [employer]     What is the request in detail:inquiring about appointment on 10/21/2021. Caller states late notice for transportation services, asking if appointment is urgent.    Can the clinic reply by [facility]?no     Would the patient rather a call back or a response via [facility]? Call     Best call back number:[phone]    Additional Information:

## 2024-10-21 ENCOUNTER — OFFICE VISIT (OUTPATIENT)
Dept: UROLOGY | Facility: CLINIC | Age: 79
End: 2024-10-21
Payer: MEDICARE

## 2024-10-21 VITALS — BODY MASS INDEX: 14.56 KG/M2 | WEIGHT: 110.31 LBS

## 2024-10-21 DIAGNOSIS — R97.20 ELEVATED PSA: ICD-10-CM

## 2024-10-21 DIAGNOSIS — N13.8 BPH WITH OBSTRUCTION/LOWER URINARY TRACT SYMPTOMS: ICD-10-CM

## 2024-10-21 DIAGNOSIS — N52.8 OTHER MALE ERECTILE DYSFUNCTION: Primary | ICD-10-CM

## 2024-10-21 DIAGNOSIS — R35.1 NOCTURIA MORE THAN TWICE PER NIGHT: ICD-10-CM

## 2024-10-21 DIAGNOSIS — N40.1 BPH WITH OBSTRUCTION/LOWER URINARY TRACT SYMPTOMS: ICD-10-CM

## 2024-10-21 PROCEDURE — 1101F PT FALLS ASSESS-DOCD LE1/YR: CPT | Mod: CPTII,S$GLB,, | Performed by: UROLOGY

## 2024-10-21 PROCEDURE — 99999 PR PBB SHADOW E&M-EST. PATIENT-LVL III: CPT | Mod: PBBFAC,,, | Performed by: UROLOGY

## 2024-10-21 PROCEDURE — 1159F MED LIST DOCD IN RCRD: CPT | Mod: CPTII,S$GLB,, | Performed by: UROLOGY

## 2024-10-21 PROCEDURE — 1160F RVW MEDS BY RX/DR IN RCRD: CPT | Mod: CPTII,S$GLB,, | Performed by: UROLOGY

## 2024-10-21 PROCEDURE — 3288F FALL RISK ASSESSMENT DOCD: CPT | Mod: CPTII,S$GLB,, | Performed by: UROLOGY

## 2024-10-21 PROCEDURE — 99214 OFFICE O/P EST MOD 30 MIN: CPT | Mod: S$GLB,,, | Performed by: UROLOGY

## 2024-10-21 RX ORDER — CIPROFLOXACIN 500 MG/1
500 TABLET ORAL 2 TIMES DAILY
Qty: 6 TABLET | Refills: 0 | Status: SHIPPED | OUTPATIENT
Start: 2024-10-21 | End: 2024-10-24

## 2024-10-21 RX ORDER — TADALAFIL 20 MG/1
20 TABLET ORAL DAILY PRN
Qty: 10 TABLET | Refills: 11 | Status: SHIPPED | OUTPATIENT
Start: 2024-10-21 | End: 2025-02-18

## 2024-10-21 NOTE — PROGRESS NOTES
Subjective:       Patient ID: Gabriel Springer is a 79 y.o. male The patient's last visit with me was on 8/29/2024.     Chief Complaint:   Chief Complaint   Patient presents with    Follow-up    Results       Erectile Dysfunction  Patient complains of erectile dysfunction. Onset of dysfunction was several years ago and was gradual in onset.  Patient states the nature of difficulty is both attaining and maintaining erection. Full erections occur never. Partial erections occur with masturbation. Libido is not affected. Risk factors for ED include cardiovascular disease. Patient denies history of pelvic radiation. Patient's expectations as to sexual function good.  Patient's description of relationship with partner good. Previous treatment of ED includes Sildenafil.    10/21/2024  Viagra Brand name was too expensive.     ACTIVE MEDICAL ISSUES:  Patient Active Problem List   Diagnosis    Tobacco abuse    Asthma    Prostate atrophy    Cytotoxic brain edema    Impaired activities of daily living    Essential hypertension    Encephalopathy    Seizure    Urinary retention       PAST MEDICAL HISTORY  Past Medical History:   Diagnosis Date    Prostate atrophy        PAST SURGICAL HISTORY:  History reviewed. No pertinent surgical history.    SOCIAL HISTORY:  Social History     Tobacco Use    Smoking status: Some Days     Types: Cigarettes   Substance Use Topics    Alcohol use: Not Currently     Alcohol/week: 1.0 standard drink of alcohol     Types: 1 Cans of beer per week     Comment: 1/31/23: Denies    Drug use: No       FAMILY HISTORY:  No family history on file.    ALLERGIES AND MEDICATIONS: updated and reviewed.  Review of patient's allergies indicates:   Allergen Reactions    Penicillins Hives     Current Outpatient Medications   Medication Sig    LIDOcaine (LIDODERM) 5 % Place 1 patch onto the skin once daily. Remove & Discard patch within 12 hours or as directed by MD. May use 4% formulation if more affordable for patient.     montelukast (SINGULAIR) 10 mg tablet Take 10 mg by mouth once daily.    SYMBICORT 160-4.5 mcg/actuation HFAA Inhale 2 puffs into the lungs 2 (two) times a day.    tamsulosin (FLOMAX) 0.4 mg Cap Take 0.4 mg by mouth once daily.    UNABLE TO FIND Take 2.5 mg by mouth nightly. medication name:  sleep aid (unknown name)    albuterol (PROVENTIL/VENTOLIN HFA) 90 mcg/actuation inhaler Inhale 2 puffs into the lungs every 6 (six) hours as needed for Shortness of Breath or Wheezing (cough and shortness of breath). Rescue    amLODIPine (NORVASC) 10 MG tablet Take 1 tablet (10 mg total) by mouth once daily.    aspirin 81 MG Chew Take 1 tablet (81 mg total) by mouth once daily.    atorvastatin (LIPITOR) 40 MG tablet Take 1 tablet (40 mg total) by mouth once daily.    ciprofloxacin HCl (CIPRO) 500 MG tablet Take 1 tablet (500 mg total) by mouth 2 (two) times daily. for 6 doses    clopidogreL (PLAVIX) 75 mg tablet Take 1 tablet (75 mg total) by mouth once daily.    tadalafiL (CIALIS) 20 MG Tab Take 1 tablet (20 mg total) by mouth daily as needed.     No current facility-administered medications for this visit.       Review of Systems   Constitutional:  Negative for chills and fever.   HENT:  Negative for congestion.    Respiratory:  Negative for chest tightness and shortness of breath.    Cardiovascular:  Negative for chest pain and palpitations.   Gastrointestinal:  Negative for abdominal pain, constipation, diarrhea, nausea and vomiting.   Genitourinary:  Negative for difficulty urinating, dysuria, flank pain, hematuria and urgency.   Musculoskeletal:  Negative for arthralgias.   Neurological:  Negative for dizziness.   Psychiatric/Behavioral:  Negative for confusion.        Objective:      Vitals:    10/21/24 1315   Weight: 50 kg (110 lb 5.4 oz)       Physical Exam  Vitals and nursing note reviewed.   Constitutional:       Appearance: He is well-developed.   HENT:      Head: Normocephalic.   Eyes:      Conjunctiva/sclera:  Conjunctivae normal.   Neck:      Thyroid: No thyromegaly.      Trachea: No tracheal deviation.   Cardiovascular:      Rate and Rhythm: Normal rate.      Heart sounds: Normal heart sounds.   Pulmonary:      Effort: Pulmonary effort is normal. No respiratory distress.      Breath sounds: Normal breath sounds. No wheezing.   Abdominal:      General: Bowel sounds are normal.      Palpations: Abdomen is soft.      Tenderness: There is no abdominal tenderness. There is no rebound.      Hernia: No hernia is present.   Musculoskeletal:         General: No tenderness. Normal range of motion.      Cervical back: Normal range of motion and neck supple.   Lymphadenopathy:      Cervical: No cervical adenopathy.   Skin:     General: Skin is warm and dry.      Findings: No erythema or rash.   Neurological:      Mental Status: He is alert and oriented to person, place, and time.   Psychiatric:         Behavior: Behavior normal.         Thought Content: Thought content normal.         Judgment: Judgment normal.         Urine dipstick shows negative for all components.  Micro exam: negative for WBC's or RBC's.         Component Ref Range & Units 13 d ago   Testosterone, Total 304 - 1227 ng/dL 899   Resulting Agency  [facility]             Specimen Collected: 10/02/24 09:42 CDT Last Resulted: 10/02/24 17:48 CDT          Component Ref Range & Units 13 d ago   PSA Diagnostic 0.00 - 4.00 ng/mL 21.2 High    Comment: The testing method is a chemiluminescent microparticle immunoassay  manufactured by [employer] and performed on the [employer]  or  [employer] system. Values obtained with different assay manufacturers  for  methods may be different and cannot be used interchangeably.  PSA Expected levels:  Hormonal Therapy: <0.05 ng/ml  Prostatectomy: <0.01 ng/ml  Radiation Therapy: <1.00 ng/ml   Resulting Agency  [facility]             Specimen Collected: 10/02/24 09:42 CDT       Assessment:       1. Other male erectile dysfunction    2. BPH  with obstruction/lower urinary tract symptoms    3. Nocturia more than twice per night    4. Elevated PSA            Plan:       1. Other male erectile dysfunction (Primary)    - tadalafiL (CIALIS) 20 MG Tab; Take 1 tablet (20 mg total) by mouth daily as needed.  Dispense: 10 tablet; Refill: 11    2. BPH with obstruction/lower urinary tract symptoms  stable    3. Nocturia more than twice per night  Limit evening fluids    4. Elevated PSA    - ciprofloxacin HCl (CIPRO) 500 MG tablet; Take 1 tablet (500 mg total) by mouth 2 (two) times daily. for 6 doses  Dispense: 6 tablet; Refill: 0  - US Biopsy Prostate Singl Multi Specimens (xpd); Future               Follow up for Prostate Biopsy.

## 2024-11-07 ENCOUNTER — TELEPHONE (OUTPATIENT)
Dept: UROLOGY | Facility: CLINIC | Age: 79
End: 2024-11-07
Payer: MEDICARE

## 2024-11-07 NOTE — TELEPHONE ENCOUNTER
Attempted to contact pt. Appt canceled.   ----- Message from Lizeth sent at 11/7/2024  8:44 AM CST -----  Regarding: Self  Type: Patient Call Back     What is the request in detail: Pt would like to cancel his procedure     Can the clinic reply by [facility]? No     Would the patient rather a call back or a response via [facility]? Call back    Best call back number: .[phone]      Additional Information:    Thank you.

## 2024-11-07 NOTE — TELEPHONE ENCOUNTER
Pt was contacted pt informed appt was canceled.   ----- Message from Eden sent at 11/7/2024  8:58 AM CST -----  .Type:  Patient Returning Call    Who Called: Self     Who Left Message for Patient: Perico    Does the patient know what this is regarding?: Yes     Would the patient rather a call back or a response via [facility]? Call Back     Best Call Back Number:[phone]      Additional Information:

## 2025-01-01 ENCOUNTER — HOSPITAL ENCOUNTER (EMERGENCY)
Facility: HOSPITAL | Age: 80
End: 2025-07-20
Attending: STUDENT IN AN ORGANIZED HEALTH CARE EDUCATION/TRAINING PROGRAM
Payer: MEDICARE

## 2025-01-01 DIAGNOSIS — I46.9 CARDIAC ARREST: Primary | ICD-10-CM

## 2025-01-01 LAB — POCT GLUCOSE: 174 MG/DL (ref 70–110)

## 2025-01-01 PROCEDURE — 99285 EMERGENCY DEPT VISIT HI MDM: CPT | Mod: 25

## 2025-01-01 PROCEDURE — 82962 GLUCOSE BLOOD TEST: CPT

## 2025-01-01 PROCEDURE — 92950 HEART/LUNG RESUSCITATION CPR: CPT

## 2025-03-11 NOTE — ASSESSMENT & PLAN NOTE
----- Message from Lm sent at 3/11/2025  7:36 AM CDT -----  Contact: Tayla  .Type:  Needs Medical AdviceWho Called:  Tayla Would the patient rather a call back or a response via [facility]?  Call back Best Call Back Number:  [phone] (home)  Additional Information: Pt is calling in regards to the appt scheduled for 8am this morning and states she will be running late with a ETA of 8:30am.  Pt would like to know if she could still be seen once she arrives Thanks   On Keppra 500mg BID   EEG negative   Will continue to monitor

## 2025-07-20 NOTE — ED PROVIDER NOTES
Encounter Date: 7/19/2025       History     Chief Complaint   Patient presents with    Cardiac Arrest     Pt arrived via EMS  after calling for respiratory distress. Pt found to be apneic and CPR started at 2108.      80-year-old male with past medical history of COPD brought into the emergency department by EMS secondary to cardiac arrest.    EMS made contact with the patient at approximately 9:08PM and was found pulseless and unresponsive.  CPR initiated patient found to have a glucose of 60.  Patient given a total of 5 mg of epi, bicarb, magnesium, and a amp of D50 via I/O.  I gel was placed by EMS and given in-line DuoNeb treatment prior to ED presentation.     Repeat blood glucose upon arrival to ED 170s    The history is provided by the EMS personnel.     Review of patient's allergies indicates:   Allergen Reactions    Penicillins Hives     Past Medical History:   Diagnosis Date    Prostate atrophy      No past surgical history on file.  No family history on file.  Social History[1]  Review of Systems   Unable to perform ROS: Acuity of condition       Physical Exam     Initial Vitals [07/19/25 2215]   BP Pulse Resp Temp SpO2   -- -- -- -- (!) 0 %      MAP       --         Physical Exam    Constitutional: Pulses: No pulses palpable. Airway: Other present. Level of Consciousness: Unresponsive.   HENT:   Head: No head trauma present.   Eyes: Pupils: Fixed and Dilated.   Cardiovascular: CPR in Progress.  Heart Sounds: None.         There is Robby device in place.  Pulmonary/Chest: Respirations: None. Ventilations: Bag-Valve-Mask.     Neurological: Unresponsive to stimuli.   Code 2 Comments: IO 2 right humeral head        ED Course   Procedures  Labs Reviewed   POCT GLUCOSE - Abnormal       Result Value    POCT Glucose 174 (*)           Imaging Results    None          Medications - No data to display  Medical Decision Making:   Initial Assessment:   80-year-old male with past medical history of COPD brought  into the emergency department by EMS secondary to cardiac arrest.  Upon arrival patient with active CPR in progress via Robby.  Per protocol of ACLS, patient was resuscitated.  During the code, possible causes of asystole were evaluated and treated, including hypoxia, hypothermia, hypo/hyperkalemia, hypomagnesemia, acidosis, hypovolemia.. Trauma (none reported, no evidence of on phys exam), toxins, tension pneumothorax (bilateral breath sounds present), cardiac tamponade (no pericardial effusion noted on ultrasound), acute myocardial infarction and pulmonary embolism.  After resuscitation, patient had no pulse appreciated and asystole on the monitor.  Given patient has been down for greater than 1 hour further resuscitation attempts would be futile and likelihood of neurologically intact recovery essentially non-existent. As such, resuscitation was ended at 10:12PM. Pt's daughter ([phone]) notified about patient is status.   Differential Diagnosis:   Differential Diagnosis includes, but is not limited to:  Primary cardiac arrest, MI/ACS, arrhythmia, aortic dissection, cardiogenic shock, respiratory failure, airway obstruction, anaphylaxis, PE, sepsis, trauma, head injury, hemorrhagic shock, CVA, hyperkalemia, hypoglycemia, hypothermia, metabolic derangement, drug overdose, drug intoxication.                      Medical Decision Making     Critical Care Procedure Note  Authorized and Performed by: Estrellita Calderon MD  Total critical care time: 45 minutes  Due to a high probability of clinically significant, life threatening deterioration, the patient required my highest level of preparedness to intervene emergently and I personally spent this critical care time directly and personally managing the patient. This critical care time included obtaining a history; examining the patient; pulse oximetry; ordering and review of studies; arranging urgent treatment with development of a management plan; evaluation of  patient's response to treatment; frequent reassessment; and, discussions with other providers.  This critical care time was performed to assess and manage the high probability of imminent, life-threatening deterioration that could result in multi-organ failure. It was exclusive of separately billable procedures and treating other patients and teaching time.  Please see MDM section and the rest of the note for further information on patient assessment and treatment.    Clinical Impression:   Final diagnoses:  [I46.9] Cardiac arrest (Primary)          ED Disposition Condition                    DISCLAIMER: This note was prepared with [employer] voice recognition transcription software. Garbled syntax, mangled pronouns, and other bizarre constructions may be attributed to that software system.         [1]   Social History  Tobacco Use    Smoking status: Some Days     Types: Cigarettes   Substance Use Topics    Alcohol use: Not Currently     Alcohol/week: 1.0 standard drink of alcohol     Types: 1 Cans of beer per week     Comment: 23: Denies    Drug use: No        Estrellita Calderon MD  [phone]

## 2025-07-20 NOTE — ED NOTES
Pt arrived 2206 on the [facility], CPR in progress  PEA w a rate of 44  Intubated w an Igel  2208 pulse check - no palpable pulse. Pt remains in PEA. CPR continued  2210 pulse check - no palpable pulse. Pt remains in PEA. CPR continued  2212 pulse check, pt w no cardiac activity, life saving measures stopped. MD declared Time of Death